# Patient Record
Sex: MALE | Race: BLACK OR AFRICAN AMERICAN | NOT HISPANIC OR LATINO | Employment: FULL TIME | RURAL
[De-identification: names, ages, dates, MRNs, and addresses within clinical notes are randomized per-mention and may not be internally consistent; named-entity substitution may affect disease eponyms.]

---

## 2020-08-28 ENCOUNTER — HISTORICAL (OUTPATIENT)
Dept: ADMINISTRATIVE | Facility: HOSPITAL | Age: 50
End: 2020-08-28

## 2021-04-07 ENCOUNTER — HISTORICAL (OUTPATIENT)
Dept: ADMINISTRATIVE | Facility: HOSPITAL | Age: 51
End: 2021-04-07

## 2021-07-08 ENCOUNTER — HOSPITAL ENCOUNTER (EMERGENCY)
Facility: HOSPITAL | Age: 51
Discharge: HOME | End: 2021-07-08
Admitting: INTERNAL MEDICINE
Payer: COMMERCIAL

## 2021-07-08 ENCOUNTER — OFFICE VISIT (OUTPATIENT)
Dept: PRIMARY CARE CLINIC | Facility: CLINIC | Age: 51
End: 2021-07-08
Payer: COMMERCIAL

## 2021-07-08 ENCOUNTER — HOSPITAL ENCOUNTER (OUTPATIENT)
Dept: RADIOLOGY | Facility: HOSPITAL | Age: 51
Discharge: HOME | End: 2021-07-08
Attending: NURSE PRACTITIONER
Payer: COMMERCIAL

## 2021-07-08 VITALS
HEIGHT: 68 IN | OXYGEN SATURATION: 99 % | RESPIRATION RATE: 20 BRPM | HEART RATE: 103 BPM | DIASTOLIC BLOOD PRESSURE: 79 MMHG | SYSTOLIC BLOOD PRESSURE: 123 MMHG | BODY MASS INDEX: 46.38 KG/M2 | WEIGHT: 306 LBS | TEMPERATURE: 98 F

## 2021-07-08 DIAGNOSIS — E11.9 TYPE 2 DIABETES MELLITUS WITHOUT COMPLICATION, WITHOUT LONG-TERM CURRENT USE OF INSULIN: ICD-10-CM

## 2021-07-08 DIAGNOSIS — I10 ESSENTIAL HYPERTENSION: Primary | ICD-10-CM

## 2021-07-08 DIAGNOSIS — R20.0 NUMBNESS OF FINGERS: ICD-10-CM

## 2021-07-08 DIAGNOSIS — M79.89 LEFT LEG SWELLING: ICD-10-CM

## 2021-07-08 DIAGNOSIS — M79.89 RIGHT LEG SWELLING: ICD-10-CM

## 2021-07-08 LAB — GLUCOSE SERPL-MCNC: 123 MG/DL (ref 70–110)

## 2021-07-08 PROCEDURE — 99212 PR OFFICE/OUTPT VISIT, EST, LEVL II, 10-19 MIN: ICD-10-PCS | Mod: ,,, | Performed by: NURSE PRACTITIONER

## 2021-07-08 PROCEDURE — 99281 EMR DPT VST MAYX REQ PHY/QHP: CPT | Mod: 25

## 2021-07-08 PROCEDURE — 99212 OFFICE O/P EST SF 10 MIN: CPT | Mod: ,,, | Performed by: NURSE PRACTITIONER

## 2021-07-08 PROCEDURE — 99900041 HC LEFT WITHOUT BEING SEEN- EMERGENCY

## 2021-07-08 PROCEDURE — 93970 EXTREMITY STUDY: CPT | Mod: TC

## 2021-07-08 RX ORDER — METFORMIN HYDROCHLORIDE 500 MG/1
TABLET ORAL
COMMUNITY
Start: 2021-06-07 | End: 2021-08-31 | Stop reason: SDUPTHER

## 2021-07-08 RX ORDER — OMEGA-3-ACID ETHYL ESTERS 1 G/1
CAPSULE, LIQUID FILLED ORAL
Status: ON HOLD | COMMUNITY
Start: 2021-01-13 | End: 2022-02-17 | Stop reason: CLARIF

## 2021-07-08 RX ORDER — POTASSIUM CHLORIDE 20 MEQ/1
20 TABLET, EXTENDED RELEASE ORAL DAILY
COMMUNITY
Start: 2021-05-10 | End: 2021-09-30 | Stop reason: SDUPTHER

## 2021-07-08 RX ORDER — AMLODIPINE BESYLATE 10 MG/1
TABLET ORAL
COMMUNITY
Start: 2021-06-07 | End: 2021-08-31 | Stop reason: SDUPTHER

## 2021-07-08 RX ORDER — HYDROCHLOROTHIAZIDE 12.5 MG/1
CAPSULE ORAL
COMMUNITY
Start: 2021-06-07

## 2021-07-08 RX ORDER — DICLOFENAC SODIUM 75 MG/1
TABLET, DELAYED RELEASE ORAL
COMMUNITY
Start: 2021-02-09 | End: 2022-05-21

## 2021-07-08 RX ORDER — OMEPRAZOLE 40 MG/1
CAPSULE, DELAYED RELEASE ORAL
COMMUNITY
Start: 2021-06-07 | End: 2021-08-31 | Stop reason: SDUPTHER

## 2021-07-08 RX ORDER — ACETAMINOPHEN, CAFFEINE, DIHYDROCODEINE BITARTRATE 320.5; 30; 16 MG/1; MG/1; MG/1
CAPSULE ORAL
COMMUNITY
Start: 2021-02-09 | End: 2022-08-02

## 2021-07-08 RX ORDER — NABUMETONE 750 MG/1
TABLET, FILM COATED ORAL
COMMUNITY
Start: 2021-04-05 | End: 2022-05-21

## 2021-07-08 RX ORDER — ATORVASTATIN CALCIUM 20 MG/1
TABLET, FILM COATED ORAL
COMMUNITY
Start: 2021-06-07 | End: 2021-08-31 | Stop reason: SDUPTHER

## 2021-07-08 RX ORDER — POTASSIUM CHLORIDE 600 MG/1
TABLET, FILM COATED, EXTENDED RELEASE ORAL
COMMUNITY
Start: 2021-01-13 | End: 2021-09-30 | Stop reason: SDUPTHER

## 2021-07-09 ENCOUNTER — TELEPHONE (OUTPATIENT)
Dept: PRIMARY CARE CLINIC | Facility: CLINIC | Age: 51
End: 2021-07-09

## 2021-07-09 DIAGNOSIS — M25.562 LEFT KNEE PAIN: ICD-10-CM

## 2021-07-09 DIAGNOSIS — M25.561 RIGHT KNEE PAIN: ICD-10-CM

## 2021-07-09 DIAGNOSIS — M17.0 PRIMARY OSTEOARTHRITIS OF BOTH KNEES: Primary | ICD-10-CM

## 2021-07-21 ENCOUNTER — CLINICAL SUPPORT (OUTPATIENT)
Dept: REHABILITATION | Facility: HOSPITAL | Age: 51
End: 2021-07-21
Payer: COMMERCIAL

## 2021-07-21 DIAGNOSIS — M25.462 SWELLING OF BOTH KNEES: ICD-10-CM

## 2021-07-21 DIAGNOSIS — M25.461 SWELLING OF BOTH KNEES: ICD-10-CM

## 2021-07-21 DIAGNOSIS — M25.561 RIGHT KNEE PAIN: ICD-10-CM

## 2021-07-21 DIAGNOSIS — M17.0 PRIMARY OSTEOARTHRITIS OF BOTH KNEES: ICD-10-CM

## 2021-07-21 DIAGNOSIS — M25.562 LEFT KNEE PAIN: ICD-10-CM

## 2021-07-21 PROCEDURE — 97014 ELECTRIC STIMULATION THERAPY: CPT

## 2021-07-21 PROCEDURE — 97110 THERAPEUTIC EXERCISES: CPT

## 2021-07-21 PROCEDURE — 97161 PT EVAL LOW COMPLEX 20 MIN: CPT

## 2021-07-26 ENCOUNTER — TELEPHONE (OUTPATIENT)
Dept: PRIMARY CARE CLINIC | Facility: CLINIC | Age: 51
End: 2021-07-26

## 2021-07-30 ENCOUNTER — CLINICAL SUPPORT (OUTPATIENT)
Dept: REHABILITATION | Facility: HOSPITAL | Age: 51
End: 2021-07-30
Payer: COMMERCIAL

## 2021-07-30 PROCEDURE — 97110 THERAPEUTIC EXERCISES: CPT

## 2021-07-30 PROCEDURE — 97014 ELECTRIC STIMULATION THERAPY: CPT

## 2021-08-12 ENCOUNTER — DOCUMENTATION ONLY (OUTPATIENT)
Dept: REHABILITATION | Facility: HOSPITAL | Age: 51
End: 2021-08-12

## 2021-08-16 ENCOUNTER — OFFICE VISIT (OUTPATIENT)
Dept: VASCULAR SURGERY | Facility: CLINIC | Age: 51
End: 2021-08-16
Payer: COMMERCIAL

## 2021-08-16 VITALS
RESPIRATION RATE: 18 BRPM | SYSTOLIC BLOOD PRESSURE: 120 MMHG | BODY MASS INDEX: 46.2 KG/M2 | DIASTOLIC BLOOD PRESSURE: 70 MMHG | HEIGHT: 68 IN | HEART RATE: 96 BPM | WEIGHT: 304.81 LBS

## 2021-08-16 DIAGNOSIS — R60.0 EDEMA OF EXTREMITIES: ICD-10-CM

## 2021-08-16 DIAGNOSIS — R23.8 OTHER SKIN CHANGES: ICD-10-CM

## 2021-08-16 DIAGNOSIS — R20.0 NUMBNESS OF FINGERS: ICD-10-CM

## 2021-08-16 DIAGNOSIS — M79.604 PAIN IN BOTH LOWER EXTREMITIES: Primary | ICD-10-CM

## 2021-08-16 DIAGNOSIS — M79.605 PAIN IN BOTH LOWER EXTREMITIES: Primary | ICD-10-CM

## 2021-08-16 PROCEDURE — 99203 OFFICE O/P NEW LOW 30 MIN: CPT | Mod: S$PBB,,, | Performed by: FAMILY MEDICINE

## 2021-08-16 PROCEDURE — 99203 PR OFFICE/OUTPT VISIT, NEW, LEVL III, 30-44 MIN: ICD-10-PCS | Mod: S$PBB,,, | Performed by: FAMILY MEDICINE

## 2021-08-16 PROCEDURE — 99215 OFFICE O/P EST HI 40 MIN: CPT | Mod: PBBFAC | Performed by: FAMILY MEDICINE

## 2021-08-26 ENCOUNTER — HOSPITAL ENCOUNTER (OUTPATIENT)
Dept: RADIOLOGY | Facility: HOSPITAL | Age: 51
Discharge: HOME | End: 2021-08-26
Attending: FAMILY MEDICINE
Payer: COMMERCIAL

## 2021-08-26 ENCOUNTER — OFFICE VISIT (OUTPATIENT)
Dept: VASCULAR SURGERY | Facility: CLINIC | Age: 51
End: 2021-08-26
Payer: COMMERCIAL

## 2021-08-26 VITALS
RESPIRATION RATE: 14 BRPM | DIASTOLIC BLOOD PRESSURE: 84 MMHG | SYSTOLIC BLOOD PRESSURE: 124 MMHG | BODY MASS INDEX: 46.07 KG/M2 | WEIGHT: 304 LBS | HEIGHT: 68 IN | HEART RATE: 92 BPM

## 2021-08-26 DIAGNOSIS — R60.0 EDEMA OF EXTREMITIES: ICD-10-CM

## 2021-08-26 DIAGNOSIS — M79.604 PAIN IN BOTH LOWER EXTREMITIES: ICD-10-CM

## 2021-08-26 DIAGNOSIS — R23.8 OTHER SKIN CHANGES: ICD-10-CM

## 2021-08-26 DIAGNOSIS — M79.605 PAIN IN BOTH LOWER EXTREMITIES: ICD-10-CM

## 2021-08-26 DIAGNOSIS — I87.2 CHRONIC VENOUS INSUFFICIENCY OF LOWER EXTREMITY: Primary | ICD-10-CM

## 2021-08-26 PROCEDURE — 99214 OFFICE O/P EST MOD 30 MIN: CPT | Mod: PBBFAC,25 | Performed by: NURSE PRACTITIONER

## 2021-08-26 PROCEDURE — 93970 US VENOUS REFLUX STUDY BILATERAL: ICD-10-PCS | Mod: 26,,, | Performed by: FAMILY MEDICINE

## 2021-08-26 PROCEDURE — 99214 PR OFFICE/OUTPT VISIT, EST, LEVL IV, 30-39 MIN: ICD-10-PCS | Mod: S$PBB,,, | Performed by: NURSE PRACTITIONER

## 2021-08-26 PROCEDURE — 93970 EXTREMITY STUDY: CPT | Mod: TC

## 2021-08-26 PROCEDURE — 99214 OFFICE O/P EST MOD 30 MIN: CPT | Mod: S$PBB,,, | Performed by: NURSE PRACTITIONER

## 2021-08-26 PROCEDURE — 93970 EXTREMITY STUDY: CPT | Mod: 26,,, | Performed by: FAMILY MEDICINE

## 2021-08-31 RX ORDER — OMEPRAZOLE 40 MG/1
40 CAPSULE, DELAYED RELEASE ORAL EVERY MORNING
Qty: 90 CAPSULE | Refills: 1 | Status: SHIPPED | OUTPATIENT
Start: 2021-08-31 | End: 2022-02-01

## 2021-08-31 RX ORDER — METFORMIN HYDROCHLORIDE 500 MG/1
500 TABLET ORAL
Qty: 90 TABLET | Refills: 1 | Status: SHIPPED | OUTPATIENT
Start: 2021-08-31 | End: 2022-02-01

## 2021-08-31 RX ORDER — ATORVASTATIN CALCIUM 20 MG/1
20 TABLET, FILM COATED ORAL NIGHTLY
Qty: 90 TABLET | Refills: 1 | Status: SHIPPED | OUTPATIENT
Start: 2021-08-31 | End: 2022-02-01

## 2021-08-31 RX ORDER — AMLODIPINE BESYLATE 10 MG/1
10 TABLET ORAL DAILY
Qty: 90 TABLET | Refills: 1 | Status: SHIPPED | OUTPATIENT
Start: 2021-08-31 | End: 2022-02-01

## 2021-09-14 ENCOUNTER — OFFICE VISIT (OUTPATIENT)
Dept: NEUROLOGY | Facility: CLINIC | Age: 51
End: 2021-09-14
Payer: COMMERCIAL

## 2021-09-14 ENCOUNTER — TELEPHONE (OUTPATIENT)
Dept: NEUROLOGY | Facility: CLINIC | Age: 51
End: 2021-09-14

## 2021-09-14 VITALS
DIASTOLIC BLOOD PRESSURE: 88 MMHG | HEART RATE: 121 BPM | BODY MASS INDEX: 44.56 KG/M2 | OXYGEN SATURATION: 97 % | SYSTOLIC BLOOD PRESSURE: 128 MMHG | HEIGHT: 68 IN | WEIGHT: 294 LBS

## 2021-09-14 DIAGNOSIS — E55.9 VITAMIN D DEFICIENCY: ICD-10-CM

## 2021-09-14 DIAGNOSIS — E53.8 VITAMIN B12 DEFICIENCY: Primary | ICD-10-CM

## 2021-09-14 DIAGNOSIS — G56.92 NEUROPATHY OF HAND, LEFT: ICD-10-CM

## 2021-09-14 DIAGNOSIS — R20.0 NUMBNESS OF FINGERS: ICD-10-CM

## 2021-09-14 PROCEDURE — 99203 OFFICE O/P NEW LOW 30 MIN: CPT | Mod: S$PBB,,, | Performed by: NURSE PRACTITIONER

## 2021-09-14 PROCEDURE — 99215 OFFICE O/P EST HI 40 MIN: CPT | Mod: PBBFAC | Performed by: NURSE PRACTITIONER

## 2021-09-14 PROCEDURE — 99203 PR OFFICE/OUTPT VISIT, NEW, LEVL III, 30-44 MIN: ICD-10-PCS | Mod: S$PBB,,, | Performed by: NURSE PRACTITIONER

## 2021-09-14 RX ORDER — ERGOCALCIFEROL 1.25 MG/1
CAPSULE ORAL
Qty: 12 CAPSULE | Refills: 2 | Status: SHIPPED | OUTPATIENT
Start: 2021-09-14

## 2021-09-30 DIAGNOSIS — E87.6 HYPOKALEMIA: Primary | ICD-10-CM

## 2021-09-30 RX ORDER — POTASSIUM CHLORIDE 20 MEQ/1
20 TABLET, EXTENDED RELEASE ORAL DAILY
Qty: 30 TABLET | Refills: 5 | Status: SHIPPED | OUTPATIENT
Start: 2021-09-30 | End: 2022-10-31 | Stop reason: SDUPTHER

## 2021-12-06 ENCOUNTER — OFFICE VISIT (OUTPATIENT)
Dept: PRIMARY CARE CLINIC | Facility: CLINIC | Age: 51
End: 2021-12-06
Payer: COMMERCIAL

## 2021-12-06 ENCOUNTER — TELEPHONE (OUTPATIENT)
Dept: PRIMARY CARE CLINIC | Facility: CLINIC | Age: 51
End: 2021-12-06
Payer: COMMERCIAL

## 2021-12-06 VITALS
BODY MASS INDEX: 44.88 KG/M2 | HEART RATE: 88 BPM | DIASTOLIC BLOOD PRESSURE: 73 MMHG | HEIGHT: 69 IN | TEMPERATURE: 98 F | WEIGHT: 303 LBS | RESPIRATION RATE: 18 BRPM | SYSTOLIC BLOOD PRESSURE: 122 MMHG | OXYGEN SATURATION: 99 %

## 2021-12-06 DIAGNOSIS — I10 PRIMARY HYPERTENSION: Primary | ICD-10-CM

## 2021-12-06 DIAGNOSIS — I10 ESSENTIAL HYPERTENSION: Primary | ICD-10-CM

## 2021-12-06 LAB
ANION GAP SERPL CALCULATED.3IONS-SCNC: 12 MMOL/L (ref 7–16)
BUN SERPL-MCNC: 25 MG/DL (ref 7–18)
BUN/CREAT SERPL: 22 (ref 6–20)
CALCIUM SERPL-MCNC: 8.3 MG/DL (ref 8.5–10.1)
CHLORIDE SERPL-SCNC: 105 MMOL/L (ref 98–107)
CHOLEST SERPL-MCNC: 122 MG/DL (ref 0–200)
CHOLEST/HDLC SERPL: 2.1 {RATIO}
CO2 SERPL-SCNC: 27 MMOL/L (ref 21–32)
CREAT SERPL-MCNC: 1.16 MG/DL (ref 0.7–1.3)
GLUCOSE SERPL-MCNC: 85 MG/DL (ref 74–106)
HDLC SERPL-MCNC: 59 MG/DL (ref 40–60)
LDLC SERPL CALC-MCNC: 11 MG/DL
LDLC/HDLC SERPL: 0.2 {RATIO}
NONHDLC SERPL-MCNC: 63 MG/DL
POTASSIUM SERPL-SCNC: 3.2 MMOL/L (ref 3.5–5.1)
SODIUM SERPL-SCNC: 141 MMOL/L (ref 136–145)
TRIGL SERPL-MCNC: 260 MG/DL (ref 35–150)
VLDLC SERPL-MCNC: 52 MG/DL

## 2021-12-06 PROCEDURE — 80061 LIPID PANEL: CPT | Mod: ,,, | Performed by: CLINICAL MEDICAL LABORATORY

## 2021-12-06 PROCEDURE — 80061 LIPID PANEL: ICD-10-PCS | Mod: ,,, | Performed by: CLINICAL MEDICAL LABORATORY

## 2021-12-06 PROCEDURE — 80048 BASIC METABOLIC PANEL: ICD-10-PCS | Mod: ,,, | Performed by: CLINICAL MEDICAL LABORATORY

## 2021-12-06 PROCEDURE — 99212 OFFICE O/P EST SF 10 MIN: CPT | Mod: ,,, | Performed by: NURSE PRACTITIONER

## 2021-12-06 PROCEDURE — 99212 PR OFFICE/OUTPT VISIT, EST, LEVL II, 10-19 MIN: ICD-10-PCS | Mod: ,,, | Performed by: NURSE PRACTITIONER

## 2021-12-06 PROCEDURE — 80048 BASIC METABOLIC PNL TOTAL CA: CPT | Mod: ,,, | Performed by: CLINICAL MEDICAL LABORATORY

## 2021-12-08 ENCOUNTER — TELEPHONE (OUTPATIENT)
Dept: PRIMARY CARE CLINIC | Facility: CLINIC | Age: 51
End: 2021-12-08
Payer: COMMERCIAL

## 2021-12-09 ENCOUNTER — OFFICE VISIT (OUTPATIENT)
Dept: NEUROLOGY | Facility: CLINIC | Age: 51
End: 2021-12-09
Payer: COMMERCIAL

## 2021-12-09 VITALS
HEIGHT: 69 IN | BODY MASS INDEX: 44.88 KG/M2 | DIASTOLIC BLOOD PRESSURE: 72 MMHG | SYSTOLIC BLOOD PRESSURE: 128 MMHG | WEIGHT: 303 LBS | RESPIRATION RATE: 18 BRPM

## 2021-12-09 DIAGNOSIS — G56.22 ULNAR NERVE COMPRESSION, LEFT: ICD-10-CM

## 2021-12-09 DIAGNOSIS — G56.21 ULNAR NERVE COMPRESSION, RIGHT: ICD-10-CM

## 2021-12-09 DIAGNOSIS — G56.03 CARPAL TUNNEL SYNDROME ON BOTH SIDES: Primary | ICD-10-CM

## 2021-12-09 PROCEDURE — 99214 PR OFFICE/OUTPT VISIT, EST, LEVL IV, 30-39 MIN: ICD-10-PCS | Mod: S$PBB,,, | Performed by: NURSE PRACTITIONER

## 2021-12-09 PROCEDURE — 99214 OFFICE O/P EST MOD 30 MIN: CPT | Mod: S$PBB,,, | Performed by: NURSE PRACTITIONER

## 2021-12-09 PROCEDURE — 99214 OFFICE O/P EST MOD 30 MIN: CPT | Mod: PBBFAC | Performed by: NURSE PRACTITIONER

## 2021-12-09 RX ORDER — GABAPENTIN 100 MG/1
100 CAPSULE ORAL 3 TIMES DAILY
Qty: 90 CAPSULE | Refills: 11 | Status: SHIPPED | OUTPATIENT
Start: 2021-12-09 | End: 2022-06-14 | Stop reason: SDUPTHER

## 2021-12-10 ENCOUNTER — TELEPHONE (OUTPATIENT)
Dept: PRIMARY CARE CLINIC | Facility: CLINIC | Age: 51
End: 2021-12-10
Payer: COMMERCIAL

## 2021-12-10 DIAGNOSIS — E78.1 HYPERTRIGLYCERIDEMIA: Primary | ICD-10-CM

## 2021-12-13 RX ORDER — GLUCOSAM/CHONDRO/HERB 149/HYAL 750-100 MG
1 TABLET ORAL DAILY
Qty: 90 CAPSULE | Refills: 3 | Status: SHIPPED | OUTPATIENT
Start: 2021-12-13 | End: 2024-01-04 | Stop reason: SDUPTHER

## 2021-12-14 ENCOUNTER — TELEPHONE (OUTPATIENT)
Dept: PRIMARY CARE CLINIC | Facility: CLINIC | Age: 51
End: 2021-12-14
Payer: COMMERCIAL

## 2021-12-14 DIAGNOSIS — E78.1 HYPERTRIGLYCERIDEMIA: Primary | ICD-10-CM

## 2021-12-14 RX ORDER — TIMOLOL MALEATE 5 MG/ML
SOLUTION/ DROPS OPHTHALMIC
COMMUNITY
Start: 2021-09-09

## 2021-12-14 RX ORDER — POTASSIUM CHLORIDE 600 MG/1
TABLET, FILM COATED, EXTENDED RELEASE ORAL
Status: ON HOLD | COMMUNITY
Start: 2021-11-20 | End: 2022-02-17 | Stop reason: CLARIF

## 2021-12-14 RX ORDER — SODIUM HYALURONATE INTRA-ARTICULAR SOLN PREF SYR 25 MG/2.5ML 25/2.5 MG/ML
SOLUTION PREFILLED SYRINGE INTRAARTICULAR
Status: ON HOLD | COMMUNITY
Start: 2021-10-13 | End: 2022-02-17 | Stop reason: CLARIF

## 2022-01-12 ENCOUNTER — HOSPITAL ENCOUNTER (OUTPATIENT)
Dept: RADIOLOGY | Facility: HOSPITAL | Age: 52
Discharge: HOME | End: 2022-01-12
Attending: ORTHOPAEDIC SURGERY
Payer: COMMERCIAL

## 2022-01-12 DIAGNOSIS — M25.532 BILATERAL WRIST PAIN: ICD-10-CM

## 2022-01-12 DIAGNOSIS — M25.531 BILATERAL WRIST PAIN: ICD-10-CM

## 2022-01-12 PROCEDURE — 73110 X-RAY EXAM OF WRIST: CPT | Mod: TC,50

## 2022-01-14 ENCOUNTER — CLINICAL SUPPORT (OUTPATIENT)
Dept: CARDIOLOGY | Facility: CLINIC | Age: 52
End: 2022-01-14
Payer: COMMERCIAL

## 2022-01-14 DIAGNOSIS — Z01.810 PRE-OPERATIVE CARDIOVASCULAR EXAMINATION: ICD-10-CM

## 2022-01-14 PROCEDURE — 93005 ELECTROCARDIOGRAM TRACING: CPT | Mod: PBBFAC | Performed by: INTERNAL MEDICINE

## 2022-01-14 PROCEDURE — 93010 ELECTROCARDIOGRAM REPORT: CPT | Mod: S$PBB,,, | Performed by: INTERNAL MEDICINE

## 2022-01-14 PROCEDURE — 93010 EKG 12-LEAD: ICD-10-PCS | Mod: S$PBB,,, | Performed by: INTERNAL MEDICINE

## 2022-01-14 PROCEDURE — 99211 OFF/OP EST MAY X REQ PHY/QHP: CPT | Mod: PBBFAC

## 2022-01-17 RX ORDER — TRAMADOL HYDROCHLORIDE 50 MG/1
TABLET ORAL
COMMUNITY
Start: 2021-12-30 | End: 2022-08-02

## 2022-01-26 ENCOUNTER — OFFICE VISIT (OUTPATIENT)
Dept: VASCULAR SURGERY | Facility: CLINIC | Age: 52
End: 2022-01-26
Payer: COMMERCIAL

## 2022-01-26 VITALS
RESPIRATION RATE: 20 BRPM | DIASTOLIC BLOOD PRESSURE: 76 MMHG | SYSTOLIC BLOOD PRESSURE: 136 MMHG | HEART RATE: 100 BPM | BODY MASS INDEX: 44.05 KG/M2 | HEIGHT: 68 IN | WEIGHT: 290.63 LBS

## 2022-01-26 DIAGNOSIS — I87.2 VENOUS INSUFFICIENCY: ICD-10-CM

## 2022-01-26 DIAGNOSIS — R60.0 EDEMA, LOWER EXTREMITY: ICD-10-CM

## 2022-01-26 DIAGNOSIS — M79.604 LEG PAIN, BILATERAL: ICD-10-CM

## 2022-01-26 DIAGNOSIS — I87.2 VENOUS INSUFFICIENCY (CHRONIC) (PERIPHERAL): ICD-10-CM

## 2022-01-26 DIAGNOSIS — M79.605 LEG PAIN, BILATERAL: ICD-10-CM

## 2022-01-26 DIAGNOSIS — I87.2 VENOUS INSUFFICIENCY: Primary | ICD-10-CM

## 2022-01-26 DIAGNOSIS — L81.9 HYPERPIGMENTATION OF SKIN: Primary | ICD-10-CM

## 2022-01-26 PROCEDURE — 3075F SYST BP GE 130 - 139MM HG: CPT | Mod: ,,, | Performed by: FAMILY MEDICINE

## 2022-01-26 PROCEDURE — 99214 PR OFFICE/OUTPT VISIT, EST, LEVL IV, 30-39 MIN: ICD-10-PCS | Mod: S$PBB,,, | Performed by: FAMILY MEDICINE

## 2022-01-26 PROCEDURE — 3008F BODY MASS INDEX DOCD: CPT | Mod: ,,, | Performed by: FAMILY MEDICINE

## 2022-01-26 PROCEDURE — 1159F MED LIST DOCD IN RCRD: CPT | Mod: ,,, | Performed by: FAMILY MEDICINE

## 2022-01-26 PROCEDURE — 3008F PR BODY MASS INDEX (BMI) DOCUMENTED: ICD-10-PCS | Mod: ,,, | Performed by: FAMILY MEDICINE

## 2022-01-26 PROCEDURE — 1160F RVW MEDS BY RX/DR IN RCRD: CPT | Mod: ,,, | Performed by: FAMILY MEDICINE

## 2022-01-26 PROCEDURE — 3078F DIAST BP <80 MM HG: CPT | Mod: ,,, | Performed by: FAMILY MEDICINE

## 2022-01-26 PROCEDURE — 3075F PR MOST RECENT SYSTOLIC BLOOD PRESS GE 130-139MM HG: ICD-10-PCS | Mod: ,,, | Performed by: FAMILY MEDICINE

## 2022-01-26 PROCEDURE — 3078F PR MOST RECENT DIASTOLIC BLOOD PRESSURE < 80 MM HG: ICD-10-PCS | Mod: ,,, | Performed by: FAMILY MEDICINE

## 2022-01-26 PROCEDURE — 1159F PR MEDICATION LIST DOCUMENTED IN MEDICAL RECORD: ICD-10-PCS | Mod: ,,, | Performed by: FAMILY MEDICINE

## 2022-01-26 PROCEDURE — 99214 OFFICE O/P EST MOD 30 MIN: CPT | Mod: S$PBB,,, | Performed by: FAMILY MEDICINE

## 2022-01-26 PROCEDURE — 1160F PR REVIEW ALL MEDS BY PRESCRIBER/CLIN PHARMACIST DOCUMENTED: ICD-10-PCS | Mod: ,,, | Performed by: FAMILY MEDICINE

## 2022-01-26 PROCEDURE — 99215 OFFICE O/P EST HI 40 MIN: CPT | Mod: PBBFAC | Performed by: FAMILY MEDICINE

## 2022-01-26 RX ORDER — SODIUM CHLORIDE 9 MG/ML
30 INJECTION, SOLUTION INTRAVENOUS CONTINUOUS
Status: CANCELLED | OUTPATIENT
Start: 2022-02-11

## 2022-01-26 NOTE — PROGRESS NOTES
[facility] CLINIC NOTE    Patient ID: Papo Bell is a 51 y.o. male.    I. HISTORY     Chief Complaint:   Chief Complaint   Patient presents with    Follow-up     4 1/2 months in compressions.        HPI: Papo Bell is a 51 y.o. male who presents today for follow-up after 4 and half months of conservative therapy for chronic venous insufficiency consisting of 20-30 mmHg compression stockings, calf pumping exercises and therapeutic leg elevation.  The patient's states that these measures helped very little with his lower extremity symptoms.  His symptoms are worse on the left.  He states he continues to have daily leg swelling, heaviness, throbbing and fatigue.  Other symptoms outlined in chart below.  He states that these symptoms are life altering at this time would like to have underlying condition corrected if possible.    The patient underwent a bilateral Doppler study on 07/09/2021 which was negative for DVT bilaterally.    Venous reflux study performed 8/26/21 showed reflux in the proximal calf to lower calf in the right GSV , noted reflux on the left at the SFJ and proximal thigh. No other areas of reflux identified.       Venous Disease Medical Necessity Documentation Initial Visit Date:  8/16/21 Return Check Date:   1/26/22   1. Have you ever had a rupture or bleed from a varicose vein in your leg(s)?              [] Yes  [x] No   [] Yes   [x] No   2. Have you ever been diagnosed with phlebitis, cellulitis, or inflammation in the area of the varicose veins of  your leg(s)?  [] Yes  [x] No    [] Yes   [x] No   3. Do you have darkened or inflamed skin on your legs?   [x] Yes   [] No   [x] Yes   [] No   4. Do you have leg swelling?     [x] Yes   [] No   [x] Yes   [] No   5. Do you have leg pain?   [x] Yes   [] No   [] Yes   [] No   If yes, describe the type of pain?    []   Stabbing []  Radiating []  Aching   []  Tightness [x]  Throbbing               [x]  Burning [x]  Cramping              6. Do you  have leg discomfort?   [x] Yes   [] No   [x] Yes   [] No   If yes, describe the type of discomfort?    [x]  Heaviness [x]  Fullness   []  Restlessness [] Tired/Fatigued [x] Itching              7. Have you ever worn compression hose?   [x] Yes, intermittently   [] No   [x] Yes   [] No   If yes, how long?           8. Do you elevate your legs while sitting?   [x] Yes   [] No   [x] Yes   [] No   9. Does venous disease (varicose veins, ulcers, skin changes, leg pain/swelling) interfere with your daily life?  If yes, check activities you are limited or unable to do.    []  Shower  []   Walk  []  Exercise  [] Play with children/grandchildren  []  Shop [x] Work [x] Stand for any period of time [] Sleep                               [] Sitting for an extended period of time.           [x] Yes   [] No   [x] Yes   [] No   10. Do you exercise/have you tried to exercise (i.e.  Walk our participate in a regular exercise routine)?  [] Yes  [x] No   [x] Yes   [] No   11. BMI  46.3  44.2          Past Medical History:   Diagnosis Date    BMI 45.0-49.9, adult 06/20/2018    Cervicalgia 04/07/2021    Diabetes mellitus, type 2     DJD (degenerative joint disease) 03/15/2019    Edema 01/27/2021    Essential hypertension 04/07/2021    GERD (gastroesophageal reflux disease) 01/13/2021    Hyperlipidemia     Hypertension     Low back pain 06/19/2019    Osteoporosis     Pain in right shoulder 04/07/2021    Tachycardia 04/07/2021        Past Surgical History:   Procedure Laterality Date    PATELLA TENDONS REPAIR Bilateral     TORN MENISCUS Left     WRIST FRACTURE SURGERY Left        Social History     Tobacco Use   Smoking Status Never Smoker   Smokeless Tobacco Never Used         Current Outpatient Medications:     acetaminophen-caff-dihydrocod 320.5-30-16 mg Cap, , Disp: , Rfl:     amLODIPine (NORVASC) 10 MG tablet, Take 1 tablet (10 mg total) by mouth once daily., Disp: 90 tablet, Rfl: 1    atorvastatin (LIPITOR) 20 MG  tablet, Take 1 tablet (20 mg total) by mouth every evening., Disp: 90 tablet, Rfl: 1    diclofenac (VOLTAREN) 75 MG EC tablet, , Disp: , Rfl:     ergocalciferol (ERGOCALCIFEROL) 50,000 unit Cap, Take 1 capsule by mouth weekly for 12 weeks then take 1 capsule monthly after that, Disp: 12 capsule, Rfl: 2    gabapentin (NEURONTIN) 100 MG capsule, Take 1 capsule (100 mg total) by mouth 3 (three) times daily., Disp: 90 capsule, Rfl: 11    hydroCHLOROthiazide (MICROZIDE) 12.5 mg capsule, , Disp: , Rfl:     KLOR-CON 8 8 mEq TbSR, , Disp: , Rfl:     metFORMIN (GLUCOPHAGE) 500 MG tablet, Take 1 tablet (500 mg total) by mouth daily with breakfast., Disp: 90 tablet, Rfl: 1    nabumetone (RELAFEN) 750 MG tablet, TAKE 1-2 TABLETS BY MOUTH DAILY WITH FOOD IF NEEDED FOR PAIN., Disp: , Rfl:     omega 3-dha-epa-fish oil (FISH OIL) 1,000 mg (120 mg-180 mg) Cap, Take 1 capsule by mouth once daily., Disp: 90 capsule, Rfl: 3    omega-3 acid ethyl esters (LOVAZA) 1 gram capsule, , Disp: , Rfl:     omeprazole (PRILOSEC) 40 MG capsule, Take 1 capsule (40 mg total) by mouth every morning., Disp: 90 capsule, Rfl: 1    potassium chloride SA (K-DUR,KLOR-CON) 20 MEQ tablet, Take 1 tablet (20 mEq total) by mouth once daily., Disp: 30 tablet, Rfl: 5    SUPARTZ FX 10 mg/mL injection, , Disp: , Rfl:     timolol maleate 0.5% (TIMOPTIC) 0.5 % Drop, PUT 1 DROP INTO AFFECTED EYE(S) EVERY MORNING, Disp: , Rfl:     traMADoL (ULTRAM) 50 mg tablet, TAKE 1-2 TABLETS BY MOUTH 3 TIMES A DAY FOR PAIN., Disp: , Rfl:     Review of Systems   Constitutional: Negative for activity change, chills, diaphoresis, fatigue and fever.   Respiratory: Negative for cough and shortness of breath.    Cardiovascular: Positive for leg swelling. Negative for chest pain and claudication.        Hyperpigmentation LE   Gastrointestinal: Negative for nausea and vomiting.   Musculoskeletal: Positive for leg pain. Negative for joint swelling.   Integumentary:  Negative  for rash and wound.   Neurological: Negative for weakness and numbness.        II. PHYSICAL EXAM     Physical Exam  Constitutional:       General: He is awake. He is not in acute distress.     Appearance: Normal appearance. He is obese. He is not ill-appearing or toxic-appearing.   HENT:      Head: Normocephalic and atraumatic.   Eyes:      Extraocular Movements: Extraocular movements intact.      Conjunctiva/sclera: Conjunctivae normal.      Pupils: Pupils are equal, round, and reactive to light.   Neck:      Vascular: No carotid bruit or JVD.   Cardiovascular:      Rate and Rhythm: Normal rate and regular rhythm.      Pulses:           Dorsalis pedis pulses are detected w/ Doppler on the right side and detected w/ Doppler on the left side.        Posterior tibial pulses are detected w/ Doppler on the right side and detected w/ Doppler on the left side.      Heart sounds: No murmur heard.      Pulmonary:      Effort: Pulmonary effort is normal. No respiratory distress.      Breath sounds: No stridor. No wheezing, rhonchi or rales.   Musculoskeletal:         General: No swelling, tenderness or deformity.      Right lower le+ Edema present.      Left lower le+ Edema present.   Feet:      Comments: Triphasic dopplerable pulses bilaterally.  Skin:     General: Skin is warm.      Capillary Refill: Capillary refill takes less than 2 seconds.      Coloration: Skin is not ashen.      Findings: No bruising, erythema, lesion, rash or wound.      Comments: Mild darker skin changes noted of bilateral gaiter regions.    Neurological:      Mental Status: He is alert and oriented to person, place, and time.      Motor: No weakness.   Psychiatric:         Speech: Speech normal.         Behavior: Behavior normal. Behavior is cooperative.       Reticular/Spider veins noted:  RLE: none  LLE: none    Varicose veins noted:  RLE: none  LLE:  none    CEAP Classification  Clinical Signs: Class 4 - Skin changes ascribed to venous  disease  Etiologic Classification: Primary  Anatomic distribution: Superficial  Pathophysiologic dysfunction: Reflux       Venous Clinical Severity Score  Pain:2=Daily, moderate activity limitation, occasional analgesics  Varicose Veins: 0=None  Venous Edema: 2=Afternoon edema, above ankle  Pigmentation: 2=Diffuse over most of gaiter distribution (lower 1/3) or recent pigmentation (purple)  Inflammation: 0=None  Induration: 0=None  Number of Active Ulcers: 0=0  Active Ulceration, Duration: 0=None  Active Ulcer Size: 0=None  Compressive Therapy: 3=Full compliance, stockings + elevation  Total Score: 9       III. ASSESSMENT & PLAN (MEDICAL DECISION MAKING)     1. Hyperpigmentation of skin    2. Edema, lower extremity    3. Venous insufficiency    4. Leg pain, bilateral        Assessment/Diagnosis and Plan:  The patient continues to have sequela of chronic venous insufficiency despite over 3 months of conservative therapy. The patient continues to have life altering symptoms as well as a positive reflux study as noted in the history of present illness above. At this time I believe it would be reasonable to proceed with a left great saphenous vein endovenous ablation for symptomatic venous insufficiency.  Untreated venous disease increases the patient's risk for cellulitis, deep vein thrombosis, chronic skin changes and venous ulceration.  Risk and benefits of the procedure were explained to the patient and the patient wishes to proceed. The patient does not have overly distended veins and denies history of DVT/PE and will not require prophylactic anticoagulation.      Collins [place],

## 2022-02-01 DIAGNOSIS — Z01.818 PRE-OPERATIVE CLEARANCE: Primary | ICD-10-CM

## 2022-02-11 ENCOUNTER — ANESTHESIA EVENT (OUTPATIENT)
Dept: SURGERY | Facility: HOSPITAL | Age: 52
End: 2022-02-11
Payer: COMMERCIAL

## 2022-02-11 ENCOUNTER — ANESTHESIA (OUTPATIENT)
Dept: SURGERY | Facility: HOSPITAL | Age: 52
End: 2022-02-11
Payer: COMMERCIAL

## 2022-02-11 ENCOUNTER — HOSPITAL ENCOUNTER (OUTPATIENT)
Facility: HOSPITAL | Age: 52
Discharge: HOME | End: 2022-02-11
Attending: FAMILY MEDICINE | Admitting: FAMILY MEDICINE
Payer: COMMERCIAL

## 2022-02-11 VITALS
BODY MASS INDEX: 44.25 KG/M2 | TEMPERATURE: 99 F | HEART RATE: 100 BPM | WEIGHT: 292 LBS | OXYGEN SATURATION: 100 % | SYSTOLIC BLOOD PRESSURE: 126 MMHG | RESPIRATION RATE: 16 BRPM | HEIGHT: 68 IN | DIASTOLIC BLOOD PRESSURE: 73 MMHG

## 2022-02-11 DIAGNOSIS — I87.2 VENOUS INSUFFICIENCY: ICD-10-CM

## 2022-02-11 DIAGNOSIS — I87.2 VENOUS INSUFFICIENCY (CHRONIC) (PERIPHERAL): Primary | ICD-10-CM

## 2022-02-11 LAB
GLUCOSE SERPL-MCNC: 110 MG/DL (ref 70–105)
GLUCOSE SERPL-MCNC: 94 MG/DL (ref 70–105)

## 2022-02-11 PROCEDURE — 36000705 HC OR TIME LEV I EA ADD 15 MIN: Performed by: FAMILY MEDICINE

## 2022-02-11 PROCEDURE — D9220A PRA ANESTHESIA: Mod: ANES,,, | Performed by: ANESTHESIOLOGY

## 2022-02-11 PROCEDURE — 36475 PR ENDOVENOUS RF, 1ST VEIN: ICD-10-PCS | Mod: LT,,, | Performed by: FAMILY MEDICINE

## 2022-02-11 PROCEDURE — 71000033 HC RECOVERY, INTIAL HOUR: Performed by: FAMILY MEDICINE

## 2022-02-11 PROCEDURE — 63600175 PHARM REV CODE 636 W HCPCS: Performed by: NURSE ANESTHETIST, CERTIFIED REGISTERED

## 2022-02-11 PROCEDURE — 37000009 HC ANESTHESIA EA ADD 15 MINS: Performed by: FAMILY MEDICINE

## 2022-02-11 PROCEDURE — C1885 CATH, TRANSLUMIN ANGIO LASER: HCPCS | Performed by: FAMILY MEDICINE

## 2022-02-11 PROCEDURE — 27201423 OPTIME MED/SURG SUP & DEVICES STERILE SUPPLY: Performed by: FAMILY MEDICINE

## 2022-02-11 PROCEDURE — D9220A PRA ANESTHESIA: ICD-10-PCS | Mod: CRNA,,, | Performed by: NURSE ANESTHETIST, CERTIFIED REGISTERED

## 2022-02-11 PROCEDURE — 82962 GLUCOSE BLOOD TEST: CPT

## 2022-02-11 PROCEDURE — 71000015 HC POSTOP RECOV 1ST HR: Performed by: FAMILY MEDICINE

## 2022-02-11 PROCEDURE — 25000003 PHARM REV CODE 250: Performed by: NURSE ANESTHETIST, CERTIFIED REGISTERED

## 2022-02-11 PROCEDURE — 36000704 HC OR TIME LEV I 1ST 15 MIN: Performed by: FAMILY MEDICINE

## 2022-02-11 PROCEDURE — D9220A PRA ANESTHESIA: ICD-10-PCS | Mod: ANES,,, | Performed by: ANESTHESIOLOGY

## 2022-02-11 PROCEDURE — 36475 ENDOVENOUS RF 1ST VEIN: CPT | Mod: LT,,, | Performed by: FAMILY MEDICINE

## 2022-02-11 PROCEDURE — 25000003 PHARM REV CODE 250: Performed by: FAMILY MEDICINE

## 2022-02-11 PROCEDURE — 27000716 HC OXISENSOR PROBE, ANY SIZE: Performed by: ANESTHESIOLOGY

## 2022-02-11 PROCEDURE — 37000008 HC ANESTHESIA 1ST 15 MINUTES: Performed by: FAMILY MEDICINE

## 2022-02-11 PROCEDURE — 27000284 HC CANNULA NASAL: Performed by: ANESTHESIOLOGY

## 2022-02-11 PROCEDURE — D9220A PRA ANESTHESIA: Mod: CRNA,,, | Performed by: NURSE ANESTHETIST, CERTIFIED REGISTERED

## 2022-02-11 RX ORDER — MEPERIDINE HYDROCHLORIDE 25 MG/ML
25 INJECTION INTRAMUSCULAR; INTRAVENOUS; SUBCUTANEOUS EVERY 10 MIN PRN
Status: DISCONTINUED | OUTPATIENT
Start: 2022-02-11 | End: 2022-02-11 | Stop reason: HOSPADM

## 2022-02-11 RX ORDER — MUPIROCIN 20 MG/G
OINTMENT TOPICAL
Status: DISCONTINUED | OUTPATIENT
Start: 2022-02-11 | End: 2022-02-11 | Stop reason: HOSPADM

## 2022-02-11 RX ORDER — ONDANSETRON 2 MG/ML
4 INJECTION INTRAMUSCULAR; INTRAVENOUS DAILY PRN
Status: DISCONTINUED | OUTPATIENT
Start: 2022-02-11 | End: 2022-02-11 | Stop reason: HOSPADM

## 2022-02-11 RX ORDER — DIPHENHYDRAMINE HYDROCHLORIDE 50 MG/ML
25 INJECTION INTRAMUSCULAR; INTRAVENOUS EVERY 6 HOURS PRN
Status: DISCONTINUED | OUTPATIENT
Start: 2022-02-11 | End: 2022-02-11 | Stop reason: HOSPADM

## 2022-02-11 RX ORDER — HYDROMORPHONE HYDROCHLORIDE 2 MG/ML
0.5 INJECTION, SOLUTION INTRAMUSCULAR; INTRAVENOUS; SUBCUTANEOUS EVERY 5 MIN PRN
Status: DISCONTINUED | OUTPATIENT
Start: 2022-02-11 | End: 2022-02-11 | Stop reason: HOSPADM

## 2022-02-11 RX ORDER — MORPHINE SULFATE 10 MG/ML
4 INJECTION INTRAMUSCULAR; INTRAVENOUS; SUBCUTANEOUS EVERY 5 MIN PRN
Status: DISCONTINUED | OUTPATIENT
Start: 2022-02-11 | End: 2022-02-11 | Stop reason: HOSPADM

## 2022-02-11 RX ORDER — OXYCODONE HYDROCHLORIDE 5 MG/1
5 TABLET ORAL
Status: DISCONTINUED | OUTPATIENT
Start: 2022-02-11 | End: 2022-02-11 | Stop reason: HOSPADM

## 2022-02-11 RX ORDER — LIDOCAINE HYDROCHLORIDE 20 MG/ML
INJECTION, SOLUTION EPIDURAL; INFILTRATION; INTRACAUDAL; PERINEURAL
Status: DISCONTINUED | OUTPATIENT
Start: 2022-02-11 | End: 2022-02-11

## 2022-02-11 RX ORDER — PROPOFOL 10 MG/ML
VIAL (ML) INTRAVENOUS
Status: DISCONTINUED | OUTPATIENT
Start: 2022-02-11 | End: 2022-02-11

## 2022-02-11 RX ORDER — SODIUM CHLORIDE 9 MG/ML
30 INJECTION, SOLUTION INTRAVENOUS CONTINUOUS
Status: DISCONTINUED | OUTPATIENT
Start: 2022-02-11 | End: 2022-02-11 | Stop reason: HOSPADM

## 2022-02-11 RX ADMIN — PROPOFOL 50 MG: 10 INJECTION, EMULSION INTRAVENOUS at 07:02

## 2022-02-11 RX ADMIN — PROPOFOL 100 MG: 10 INJECTION, EMULSION INTRAVENOUS at 07:02

## 2022-02-11 RX ADMIN — LIDOCAINE HYDROCHLORIDE 100 MG: 20 INJECTION, SOLUTION INTRAVENOUS at 07:02

## 2022-02-11 RX ADMIN — SODIUM CHLORIDE 30 ML/HR: 9 INJECTION, SOLUTION INTRAVENOUS at 06:02

## 2022-02-11 RX ADMIN — SODIUM BICARBONATE: 84 INJECTION, SOLUTION INTRAVENOUS at 07:02

## 2022-02-11 NOTE — ANESTHESIA PREPROCEDURE EVALUATION
02/11/2022  Papo Bell is a 51 y.o., male.    Anesthesia Evaluation    I have reviewed the Patient Summary Reports.    I have reviewed the Nursing Notes. I have reviewed the NPO Status.   I have reviewed the Medications.     Review of Systems  Anesthesia Hx:  No problems with previous Anesthesia    Social:  Non-Smoker, No Alcohol Use    Hematology/Oncology:  Hematology Normal   Oncology Normal     EENT/Dental:EENT/Dental Normal   Cardiovascular:   Hypertension  Chronic Venous Insufficiency, bilateral lower extremity    Pulmonary:  Pulmonary Normal    Renal/:  Renal/ Normal     Hepatic/GI:   GERD    Musculoskeletal:   Arthritis     Neurological:   Neuromuscular Disease,   Chronic Pain Syndrome   Endocrine:   Diabetes    Dermatological:  Skin Normal    Psych:  Psychiatric Normal           Physical Exam  General:  Morbid Obesity    Airway/Jaw/Neck:  Airway Findings: Mouth Opening: Normal Mallampati: II     Eyes/Ears/Nose:  Eyes/Ears/Nose Findings:     Chest/Lungs:  Chest/Lungs Findings:        Mental Status:  Mental Status Findings:  Cooperative, Alert and Oriented         Anesthesia Plan  Type of Anesthesia, risks & benefits discussed:  Anesthesia Type:  general, MAC    Patient's Preference:   Plan Factors:          Intra-op Monitoring Plan: standard ASA monitors  Intra-op Monitoring Plan Comments:   Post Op Pain Control Plan: per primary service following discharge from PACU and multimodal analgesia  Post Op Pain Control Plan Comments:     Induction:   IV  Beta Blocker:  Patient is not currently on a Beta-Blocker (No further documentation required).       Informed Consent: Patient understands risks and agrees with Anesthesia plan.  Questions answered. Anesthesia consent signed with patient.  ASA Score: 3     Day of Surgery Review of History & Physical: I have interviewed and examined the patient. I have  reviewed the patient's H&P dated:  There are no significant changes.          Ready For Surgery From Anesthesia Perspective.

## 2022-02-11 NOTE — OP NOTE
Procedure:  Endovenous radio frequency ablation of the Left great saphenous vein(s) of the lower extremity.    Preprocedure and postprocedure diagnosis: Symptomatic varicose veins and venous insufficiency of the Left leg and other complications to include leg pain, leg edema and chronic skin changes.  The patient has previously failed conservative therapy consider a C4 chronic venous disease with venous hypertension.    Anesthesia: Local infiltration with monitored anesthesia care.  Estimated blood loss 5 cc.  Specimens removed none.   Complications none.  Implants or grafts: none.    Findings:  A total of 5 cycles of radiofrequency ablation was used to treat 47.5 cm of vein over 1 minute 40 sec.  Maximum diameter of the vein treated is 8.2 mm with a maximum reflux time of 0.8 seconds.    Procedure detail:  After patient identification, surgical site verification, and marked symptomatic Left leg, the patient was taken to the procedure room and placed in the supine position.  Monitored anesthesia care was induced.  The patient was prepped and draped in the normal sterile fashion leaving the Left leg exposed.  A time-out performed identifying the patient and the correct surgical site.  Tumescent anesthesia was then infiltrated at the site of expected cannulation of the Left great saphenous vein.  The Left great saphenous vein was then cannulated using the modified Seldinger technique to place a 6 Malay sheath.  I then advanced a 10x60 cm radiofrequency ablation catheter through the sheath up the saphenous vein to approximately 2 cm distal to the epigastric vein.  Tumescence anesthesia was then infiltrated around the vein to be treated.  The patient was placed in the head-down position.  Radiofrequency ablation of the Left great saphenous vein was then performed using a total of 5 cycles of radiofrequency ablation to treat 47.5 cm of vein over 1 minute 40sec.  After this was done the catheter and sheath were removed  and noted to be intact.  Completion ultrasound revealed a widely patent femoral vein, sapheno femoral junction and an ablated great saphenous vein.  The patient's leg was then cleaned and dried.  Sterile dressings and wraps were applied.  The patient was transported to the recovery room in stable condition.

## 2022-02-11 NOTE — INTERVAL H&P NOTE
The patient has been examined and the H&P has been reviewed:    I concur with the findings and no changes have occurred since H&P was written.    Surgery risks, benefits and alternative options discussed and understood by patient/family for a left great saphenous vein radiofrequency ablation.          There are no hospital problems to display for this patient.

## 2022-02-11 NOTE — OR NURSING
0811 received from OR    0815 glucose 110    0840 recovery complete, return to asc 23    0850 report given to cayla farah, 100% room air; 92; 16; 137/80. Family at bedside.

## 2022-02-11 NOTE — TRANSFER OF CARE
"Anesthesia Transfer of Care Note    Patient: Papo Bell    Procedure(s) Performed: Procedure(s) (LRB):  Left GSV RF Ablation (Left)    Patient location: PACU    Anesthesia Type: general    Transport from OR: Transported from OR on room air with adequate spontaneous ventilation    Post pain: adequate analgesia    Post assessment: no apparent anesthetic complications    Post vital signs: stable    Level of consciousness: awake and alert    Nausea/Vomiting: no nausea/vomiting    Complications: none    Transfer of care protocol was followed      Last vitals:   Visit Vitals  /64   Pulse 93   Temp 36.3 °C (97.4 °F) (Oral)   Resp 17   Ht 5' 8" (1.727 m)   Wt 132.5 kg (292 lb)   SpO2 100%   BMI 44.40 kg/m²     "

## 2022-02-15 ENCOUNTER — OFFICE VISIT (OUTPATIENT)
Dept: NEUROLOGY | Facility: CLINIC | Age: 52
End: 2022-02-15
Payer: COMMERCIAL

## 2022-02-15 VITALS
HEIGHT: 68 IN | OXYGEN SATURATION: 98 % | RESPIRATION RATE: 18 BRPM | DIASTOLIC BLOOD PRESSURE: 78 MMHG | SYSTOLIC BLOOD PRESSURE: 124 MMHG | HEART RATE: 100 BPM | WEIGHT: 292 LBS | BODY MASS INDEX: 44.25 KG/M2

## 2022-02-15 DIAGNOSIS — G56.21 ULNAR NERVE COMPRESSION, RIGHT: ICD-10-CM

## 2022-02-15 DIAGNOSIS — G56.22 ULNAR NERVE COMPRESSION, LEFT: ICD-10-CM

## 2022-02-15 DIAGNOSIS — G56.03 CARPAL TUNNEL SYNDROME ON BOTH SIDES: Primary | ICD-10-CM

## 2022-02-15 PROCEDURE — 3078F PR MOST RECENT DIASTOLIC BLOOD PRESSURE < 80 MM HG: ICD-10-PCS | Mod: ,,, | Performed by: NURSE PRACTITIONER

## 2022-02-15 PROCEDURE — 3074F SYST BP LT 130 MM HG: CPT | Mod: ,,, | Performed by: NURSE PRACTITIONER

## 2022-02-15 PROCEDURE — 99212 PR OFFICE/OUTPT VISIT, EST, LEVL II, 10-19 MIN: ICD-10-PCS | Mod: S$PBB,,, | Performed by: NURSE PRACTITIONER

## 2022-02-15 PROCEDURE — 1159F PR MEDICATION LIST DOCUMENTED IN MEDICAL RECORD: ICD-10-PCS | Mod: ,,, | Performed by: NURSE PRACTITIONER

## 2022-02-15 PROCEDURE — 99212 OFFICE O/P EST SF 10 MIN: CPT | Mod: S$PBB,,, | Performed by: NURSE PRACTITIONER

## 2022-02-15 PROCEDURE — 99215 OFFICE O/P EST HI 40 MIN: CPT | Mod: PBBFAC | Performed by: NURSE PRACTITIONER

## 2022-02-15 PROCEDURE — 3008F PR BODY MASS INDEX (BMI) DOCUMENTED: ICD-10-PCS | Mod: ,,, | Performed by: NURSE PRACTITIONER

## 2022-02-15 PROCEDURE — 1160F RVW MEDS BY RX/DR IN RCRD: CPT | Mod: ,,, | Performed by: NURSE PRACTITIONER

## 2022-02-15 PROCEDURE — 3074F PR MOST RECENT SYSTOLIC BLOOD PRESSURE < 130 MM HG: ICD-10-PCS | Mod: ,,, | Performed by: NURSE PRACTITIONER

## 2022-02-15 PROCEDURE — 1159F MED LIST DOCD IN RCRD: CPT | Mod: ,,, | Performed by: NURSE PRACTITIONER

## 2022-02-15 PROCEDURE — 3008F BODY MASS INDEX DOCD: CPT | Mod: ,,, | Performed by: NURSE PRACTITIONER

## 2022-02-15 PROCEDURE — 3078F DIAST BP <80 MM HG: CPT | Mod: ,,, | Performed by: NURSE PRACTITIONER

## 2022-02-15 PROCEDURE — 1160F PR REVIEW ALL MEDS BY PRESCRIBER/CLIN PHARMACIST DOCUMENTED: ICD-10-PCS | Mod: ,,, | Performed by: NURSE PRACTITIONER

## 2022-02-15 NOTE — PATIENT INSTRUCTIONS
1. Carpal tunnel braces to both wrists at night when sleeping    2. Start neurontin 100mg three times daily    3. Keep surgery appointment for Dr. Crocker

## 2022-02-15 NOTE — PROGRESS NOTES
Subjective:       Patient ID: Papo Bell is a 51 y.o. male     Chief Complaint:    Chief Complaint   Patient presents with    Follow-up        Allergies:  Patient has no known allergies.    Current Medications:    Outpatient Encounter Medications as of 2/15/2022   Medication Sig Dispense Refill    amLODIPine (NORVASC) 10 MG tablet TAKE 1 TABLET DAILY 90 tablet 1    atorvastatin (LIPITOR) 20 MG tablet TAKE 1 TABLET EVERY EVENING 90 tablet 1    diclofenac (VOLTAREN) 75 MG EC tablet       ergocalciferol (ERGOCALCIFEROL) 50,000 unit Cap Take 1 capsule by mouth weekly for 12 weeks then take 1 capsule monthly after that 12 capsule 2    gabapentin (NEURONTIN) 100 MG capsule Take 1 capsule (100 mg total) by mouth 3 (three) times daily. 90 capsule 11    hydroCHLOROthiazide (MICROZIDE) 12.5 mg capsule       KLOR-CON 8 8 mEq TbSR       metFORMIN (GLUCOPHAGE) 500 MG tablet TAKE 1 TABLET DAILY WITH   BREAKFAST 90 tablet 1    nabumetone (RELAFEN) 750 MG tablet TAKE 1-2 TABLETS BY MOUTH DAILY WITH FOOD IF NEEDED FOR PAIN.      omega 3-dha-epa-fish oil (FISH OIL) 1,000 mg (120 mg-180 mg) Cap Take 1 capsule by mouth once daily. 90 capsule 3    omeprazole (PRILOSEC) 40 MG capsule TAKE 1 CAPSULE EVERY       MORNING 90 capsule 1    potassium chloride SA (K-DUR,KLOR-CON) 20 MEQ tablet Take 1 tablet (20 mEq total) by mouth once daily. 30 tablet 5    timolol maleate 0.5% (TIMOPTIC) 0.5 % Drop PUT 1 DROP INTO AFFECTED EYE(S) EVERY MORNING      traMADoL (ULTRAM) 50 mg tablet TAKE 1-2 TABLETS BY MOUTH 3 TIMES A DAY FOR PAIN.      acetaminophen-caff-dihydrocod 320.5-30-16 mg Cap       omega-3 acid ethyl esters (LOVAZA) 1 gram capsule       SUPARTZ FX 10 mg/mL injection        No facility-administered encounter medications on file as of 2/15/2022.       History of Present Illness  52 y/o AAM following in clinic for finding of bilateral carpal tunnel syndrome.    Has peripheral vascular disease, followed by   [facility].    EMG/NCS done 11/15/21 showed moderately severe left and mild right carpal tunnel.  There was also noted mild to moderate bilateral ulnar nerve neuropathies as well.  He is to have surgery per Dr. Crocker in two days.         Review of Systems  Review of Systems   Constitutional: Negative for diaphoresis and fever.   HENT: Negative for congestion, hearing loss and tinnitus.    Eyes: Negative for blurred vision, double vision, photophobia, discharge and redness.   Respiratory: Negative for cough and shortness of breath.    Cardiovascular: Negative for chest pain.   Gastrointestinal: Negative for abdominal pain, nausea and vomiting.   Musculoskeletal: Negative for back pain, joint pain, myalgias and neck pain.   Skin: Negative for itching and rash.   Neurological: Positive for tingling and sensory change. Negative for dizziness, tremors, speech change, focal weakness, seizures, loss of consciousness, weakness and headaches.   Psychiatric/Behavioral: Negative for depression, hallucinations and memory loss. The patient does not have insomnia.    All other systems reviewed and are negative.     Objective:     NEUROLOGICAL EXAMINATION:     MENTAL STATUS   Oriented to person, place, and time.   Registration: recalls 3 of 3 objects. Recall at 5 minutes: recalls 3 of 3 objects.   Attention: normal. Concentration: normal.   Speech: speech is normal   Level of consciousness: alert  Knowledge: good and consistent with education.   Normal comprehension.     CRANIAL NERVES     CN II   Visual fields full to confrontation.   Visual acuity: normal  Right visual field deficit: none  Left visual field deficit: none     CN III, IV, VI   Pupils are equal, round, and reactive to light.  Extraocular motions are normal.   Right pupil: Size: 3 mm. Shape: regular. Reactivity: brisk. Consensual response: intact. Accommodation: intact.   Left pupil: Size: 3 mm. Shape: regular. Reactivity: brisk. Consensual response: intact.  Accommodation: intact.   CN III: no CN III palsy  CN VI: no CN VI palsy  Nystagmus: none   Diplopia: none  Upgaze: normal  Downgaze: normal  Conjugate gaze: present  Vestibulo-ocular reflex: present    CN V   Facial sensation intact.   Right facial sensation deficit: none  Left facial sensation deficit: none  Right corneal reflex: normal  Left corneal reflex: normal    CN VII   Facial expression full, symmetric.   Right facial weakness: none  Left facial weakness: none  Right taste: normal  Left taste: normal    CN VIII   CN VIII normal.   Hearing: intact    CN IX, X   CN IX normal.   CN X normal.   Palate: symmetric    CN XI   CN XI normal.   Right sternocleidomastoid strength: normal  Left sternocleidomastoid strength: normal  Right trapezius strength: normal  Left trapezius strength: normal    CN XII   CN XII normal.   Tongue: not atrophic  Fasciculations: absent  Tongue deviation: none    MOTOR EXAM   Muscle bulk: normal  Overall muscle tone: normal  Right arm tone: normal  Left arm tone: normal  Right arm pronator drift: absent  Left arm pronator drift: absent  Right leg tone: normal  Left leg tone: normal    Strength   Right neck flexion: 5/5  Left neck flexion: 5/5  Right neck extension: 5/5  Left neck extension: 5/5  Right deltoid: 5/5  Left deltoid: 5/5  Right biceps: 5/5  Left biceps: 5/5  Right triceps: 5/5  Left triceps: 5/5  Right wrist flexion: 5/5  Left wrist flexion: 5/5  Right wrist extension: 5/5  Left wrist extension: 5/5  Right interossei: 5/5  Left interossei: 5/5  Right iliopsoas: 5/5  Left iliopsoas: 5/5  Right quadriceps: 5/5  Left quadriceps: 5/5  Right hamstrin/5  Left hamstrin/5  Right anterior tibial: 5/5  Left anterior tibial: 5/5  Right posterior tibial: 5/5  Left posterior tibial: 5/5  Right gastroc: 5/5  Left gastroc: 5/5    REFLEXES     Reflexes   Right brachioradialis: 2+  Left brachioradialis: 2+  Right biceps: 2+  Left biceps: 2+  Right triceps: 2+  Left triceps: 2+  Right  patellar: 2+  Left patellar: 2+  Right achilles: 2+  Left achilles: 2+  Right : 2+  Left : 2+  Right plantar: normal  Left plantar: normal  Right Moss: absent  Left Moss: absent  Right ankle clonus: absent  Left ankle clonus: absent  Right pendular knee jerk: absent  Left pendular knee jerk: absent    SENSORY EXAM   Light touch normal.   Right arm light touch: normal  Left arm light touch: normal  Right leg light touch: normal  Left leg light touch: normal  Vibration normal.   Right arm vibration: normal  Left arm vibration: normal  Right leg vibration: normal  Left leg vibration: normal  Proprioception normal.   Right arm proprioception: normal  Left arm proprioception: normal  Right leg proprioception: normal  Left leg proprioception: normal  Right arm pinprick: decreased from fingers  Left arm pinprick: decreased from fingers  Right leg pinprick: normal  Left leg pinprick: normal  Graphesthesia: normal  Romberg: negative  Stereognosis: normal    GAIT AND COORDINATION     Gait  Gait: normal     Coordination   Finger to nose coordination: normal  Heel to shin coordination: normal  Tandem walking coordination: normal    Tremor   Resting tremor: absent  Intention tremor: absent  Action tremor: absent       Physical Exam  Vitals and nursing note reviewed.   Constitutional:       Appearance: Normal appearance.   HENT:      Head: Normocephalic.   Eyes:      Extraocular Movements: EOM normal.      Pupils: Pupils are equal, round, and reactive to light.   Cardiovascular:      Rate and Rhythm: Normal rate and regular rhythm.      Pulses: Normal pulses.      Heart sounds: Normal heart sounds.   Pulmonary:      Effort: Pulmonary effort is normal.      Breath sounds: Normal breath sounds.   Musculoskeletal:         General: Normal range of motion.      Cervical back: Normal range of motion and neck supple.   Skin:     General: Skin is warm and dry.   Neurological:      General: No focal deficit present.       Mental Status: He is alert and oriented to person, place, and time.      Cranial Nerves: No cranial nerve deficit.      Sensory: No sensory deficit.      Motor: No weakness.      Coordination: Coordination normal. Finger-Nose-Finger Test, Heel to Shin Test and Romberg Test normal.      Gait: Gait is intact. Gait and tandem walk normal.      Deep Tendon Reflexes: Reflexes normal.      Reflex Scores:       Tricep reflexes are 2+ on the right side and 2+ on the left side.       Bicep reflexes are 2+ on the right side and 2+ on the left side.       Brachioradialis reflexes are 2+ on the right side and 2+ on the left side.       Patellar reflexes are 2+ on the right side and 2+ on the left side.       Achilles reflexes are 2+ on the right side and 2+ on the left side.  Psychiatric:         Mood and Affect: Mood normal.         Speech: Speech normal.         Behavior: Behavior normal.          Assessment:     Problem List Items Addressed This Visit        Neuro    Carpal tunnel syndrome on both sides - Primary    Ulnar nerve compression, left    Ulnar nerve compression, right           Primary Diagnosis and ICD10  Carpal tunnel syndrome on both sides [G56.03]    Plan:     Patient Instructions   1. Carpal tunnel braces to both wrists at night when sleeping    2. Start neurontin 100mg three times daily    3. Keep surgery appointment for Dr. Crocker      There are no discontinued medications.    Requested Prescriptions      No prescriptions requested or ordered in this encounter       No orders of the defined types were placed in this encounter.

## 2022-02-16 ENCOUNTER — OFFICE VISIT (OUTPATIENT)
Dept: VASCULAR SURGERY | Facility: CLINIC | Age: 52
End: 2022-02-16
Payer: COMMERCIAL

## 2022-02-16 ENCOUNTER — HOSPITAL ENCOUNTER (OUTPATIENT)
Dept: RADIOLOGY | Facility: HOSPITAL | Age: 52
Discharge: HOME | End: 2022-02-16
Attending: FAMILY MEDICINE
Payer: COMMERCIAL

## 2022-02-16 VITALS
HEIGHT: 68 IN | BODY MASS INDEX: 42.89 KG/M2 | WEIGHT: 283 LBS | RESPIRATION RATE: 20 BRPM | DIASTOLIC BLOOD PRESSURE: 90 MMHG | SYSTOLIC BLOOD PRESSURE: 130 MMHG | HEART RATE: 120 BPM

## 2022-02-16 DIAGNOSIS — R60.0 EDEMA, LOWER EXTREMITY: ICD-10-CM

## 2022-02-16 DIAGNOSIS — R23.8 OTHER SKIN CHANGES: ICD-10-CM

## 2022-02-16 DIAGNOSIS — L81.9 HYPERPIGMENTATION OF SKIN: ICD-10-CM

## 2022-02-16 DIAGNOSIS — I87.2 VENOUS INSUFFICIENCY (CHRONIC) (PERIPHERAL): ICD-10-CM

## 2022-02-16 DIAGNOSIS — I87.2 VENOUS INSUFFICIENCY: Primary | ICD-10-CM

## 2022-02-16 PROCEDURE — 99214 OFFICE O/P EST MOD 30 MIN: CPT | Mod: S$PBB,,, | Performed by: NURSE PRACTITIONER

## 2022-02-16 PROCEDURE — 3008F PR BODY MASS INDEX (BMI) DOCUMENTED: ICD-10-PCS | Mod: ,,, | Performed by: NURSE PRACTITIONER

## 2022-02-16 PROCEDURE — 3008F BODY MASS INDEX DOCD: CPT | Mod: ,,, | Performed by: NURSE PRACTITIONER

## 2022-02-16 PROCEDURE — 99214 PR OFFICE/OUTPT VISIT, EST, LEVL IV, 30-39 MIN: ICD-10-PCS | Mod: S$PBB,,, | Performed by: NURSE PRACTITIONER

## 2022-02-16 PROCEDURE — 1159F MED LIST DOCD IN RCRD: CPT | Mod: ,,, | Performed by: NURSE PRACTITIONER

## 2022-02-16 PROCEDURE — 93971 EXTREMITY STUDY: CPT | Mod: TC

## 2022-02-16 PROCEDURE — 3075F SYST BP GE 130 - 139MM HG: CPT | Mod: ,,, | Performed by: NURSE PRACTITIONER

## 2022-02-16 PROCEDURE — 3080F PR MOST RECENT DIASTOLIC BLOOD PRESSURE >= 90 MM HG: ICD-10-PCS | Mod: ,,, | Performed by: NURSE PRACTITIONER

## 2022-02-16 PROCEDURE — 93971 US POST ABLATION VENOUS: ICD-10-PCS | Mod: 26,,, | Performed by: FAMILY MEDICINE

## 2022-02-16 PROCEDURE — 3075F PR MOST RECENT SYSTOLIC BLOOD PRESS GE 130-139MM HG: ICD-10-PCS | Mod: ,,, | Performed by: NURSE PRACTITIONER

## 2022-02-16 PROCEDURE — 93971 EXTREMITY STUDY: CPT | Mod: 26,,, | Performed by: FAMILY MEDICINE

## 2022-02-16 PROCEDURE — 99214 OFFICE O/P EST MOD 30 MIN: CPT | Mod: PBBFAC,25 | Performed by: NURSE PRACTITIONER

## 2022-02-16 PROCEDURE — 3080F DIAST BP >= 90 MM HG: CPT | Mod: ,,, | Performed by: NURSE PRACTITIONER

## 2022-02-16 PROCEDURE — 1159F PR MEDICATION LIST DOCUMENTED IN MEDICAL RECORD: ICD-10-PCS | Mod: ,,, | Performed by: NURSE PRACTITIONER

## 2022-02-16 NOTE — HPI
51-year-old male with left ulnar nerve entrapment carpal tunnel syndrome needing left ulnar nerve transposition carpal tunnel release  Left upper extremity has decreased sensation in the ulnar and median distribution he is able to abduct and adduct his fingers but is there week he can weakly oppose his thumb to his little finger he has palpable pulse at the wrist full motion in the wrist and hand  X-rays show no fracture subluxation left wrist  EMG is positive for left carpal tunnel syndrome left ulnar nerve entrapment at the elbow  Impression left ulnar nerve entrapment at the elbow with left carpal tunnel syndrome  Plan left ulnar nerve transposition at the elbow with carpal tunnel release

## 2022-02-16 NOTE — PROGRESS NOTES
[facility] CLINIC NOTE    Patient ID: Papo Bell is a 51 y.o. male.    I. HISTORY     Chief Complaint:   Chief Complaint   Patient presents with    Follow-up     5 days s/p Left GSV RF Ablation. Suture removed x 1 from left lower leg and steri strip applied.       HPI: Papo Bell is a 51 y.o. male who presents today for a 5 day f/u s/p left GSV RF ablation. Patient states that his leg symptoms are some improved but not completely resolved. He continues to wear compression daily, elevates his legs periodically and performs calf pumping exercises. No open ulcers. No history of DVT.     Vein procedure:   Lt. GSV RF ablation 2-      Past Medical History:   Diagnosis Date    BMI 45.0-49.9, adult 06/20/2018    Cervicalgia 04/07/2021    Diabetes mellitus, type 2     DJD (degenerative joint disease) 03/15/2019    Edema 01/27/2021    Essential hypertension 04/07/2021    GERD (gastroesophageal reflux disease) 01/13/2021    Hyperlipidemia     Hypertension     Low back pain 06/19/2019    Osteoporosis     Pain in right shoulder 04/07/2021    Tachycardia 04/07/2021        Past Surgical History:   Procedure Laterality Date    PATELLA TENDONS REPAIR Bilateral     RADIOFREQUENCY ABLATION Left 2/11/2022    Procedure: Left GSV RF Ablation;  Surgeon: Collins Canada DO;  Location: [facility];  Service: Vascular;  Laterality: Left;    TORN MENISCUS Left     WRIST FRACTURE SURGERY Left        Social History     Tobacco Use   Smoking Status Never Smoker   Smokeless Tobacco Never Used         Current Outpatient Medications:     acetaminophen-caff-dihydrocod 320.5-30-16 mg Cap, , Disp: , Rfl:     amLODIPine (NORVASC) 10 MG tablet, TAKE 1 TABLET DAILY, Disp: 90 tablet, Rfl: 1    atorvastatin (LIPITOR) 20 MG tablet, TAKE 1 TABLET EVERY EVENING, Disp: 90 tablet, Rfl: 1    diclofenac (VOLTAREN) 75 MG EC tablet, , Disp: , Rfl:     ergocalciferol (ERGOCALCIFEROL) 50,000 unit Cap, Take 1 capsule by mouth  weekly for 12 weeks then take 1 capsule monthly after that, Disp: 12 capsule, Rfl: 2    gabapentin (NEURONTIN) 100 MG capsule, Take 1 capsule (100 mg total) by mouth 3 (three) times daily., Disp: 90 capsule, Rfl: 11    hydroCHLOROthiazide (MICROZIDE) 12.5 mg capsule, , Disp: , Rfl:     KLOR-CON 8 8 mEq TbSR, , Disp: , Rfl:     metFORMIN (GLUCOPHAGE) 500 MG tablet, TAKE 1 TABLET DAILY WITH   BREAKFAST, Disp: 90 tablet, Rfl: 1    nabumetone (RELAFEN) 750 MG tablet, TAKE 1-2 TABLETS BY MOUTH DAILY WITH FOOD IF NEEDED FOR PAIN., Disp: , Rfl:     omega 3-dha-epa-fish oil (FISH OIL) 1,000 mg (120 mg-180 mg) Cap, Take 1 capsule by mouth once daily., Disp: 90 capsule, Rfl: 3    omega-3 acid ethyl esters (LOVAZA) 1 gram capsule, , Disp: , Rfl:     omeprazole (PRILOSEC) 40 MG capsule, TAKE 1 CAPSULE EVERY       MORNING, Disp: 90 capsule, Rfl: 1    potassium chloride SA (K-DUR,KLOR-CON) 20 MEQ tablet, Take 1 tablet (20 mEq total) by mouth once daily., Disp: 30 tablet, Rfl: 5    SUPARTZ FX 10 mg/mL injection, , Disp: , Rfl:     timolol maleate 0.5% (TIMOPTIC) 0.5 % Drop, PUT 1 DROP INTO AFFECTED EYE(S) EVERY MORNING, Disp: , Rfl:     traMADoL (ULTRAM) 50 mg tablet, TAKE 1-2 TABLETS BY MOUTH 3 TIMES A DAY FOR PAIN., Disp: , Rfl:     Review of Systems   Constitutional: Negative for fatigue and fever.   Eyes: Negative for pain.   Respiratory: Negative for chest tightness and shortness of breath.    Cardiovascular: Positive for leg swelling. Negative for chest pain.   Gastrointestinal: Negative for abdominal pain.   Musculoskeletal: Negative for leg pain.        Chronic darker skin changes bilaterally   Neurological: Positive for numbness. Negative for syncope.   Psychiatric/Behavioral: Negative for sleep disturbance.          II. PHYSICAL EXAM     Physical Exam  Vitals reviewed.   Constitutional:       General: He is not in acute distress.     Appearance: He is obese.   HENT:      Head: Normocephalic.   Eyes:       Pupils: Pupils are equal, round, and reactive to light.   Cardiovascular:      Rate and Rhythm: Regular rhythm. Tachycardia present.   Pulmonary:      Effort: Pulmonary effort is normal.   Abdominal:      Palpations: Abdomen is soft.   Musculoskeletal:         General: No swelling. Normal range of motion.      Cervical back: Neck supple.      Left lower leg: Edema present.      Comments: Mild left leg swelling   Skin:     General: Skin is warm and dry.   Neurological:      Mental Status: He is alert and oriented to person, place, and time.           Left saphenofemoral junction and common femoral vein appeared to be widely patent with no evidence of EHIT phenomena or thrombosis.  The left GSV appears to be ablated as expected.      III. ASSESSMENT & PLAN (MEDICAL DECISION MAKING)     1. Venous insufficiency    2. Other skin changes    3. Hyperpigmentation of skin    4. Edema, lower extremity          Assessment/Diagnosis and Plan:  The patient is doing well post ablation.    - Recommend continued compression with 20-30mmHg Rx stockings  - Therapeutic leg elevation  - Calf pumping exercises  - RTC 1 months for further evaluation, no PA US needed unless he's having problems. Patient is low risk.       KIRK Shi

## 2022-02-16 NOTE — H&P
[facility] - Orthopedic  Orthopedics  H&P    Patient Name: Papo Bell  MRN: [id number]  Admission Date: (Not on file)  Primary Care Provider: Huey Mtz DNP, RYNE    Patient information was obtained from patient and past medical records.     Subjective:     Principal Problem:<principal problem not specified>    Chief Complaint: No chief complaint on file.       HPI: 51-year-old male with left ulnar nerve entrapment carpal tunnel syndrome needing left ulnar nerve transposition carpal tunnel release  Left upper extremity has decreased sensation in the ulnar and median distribution he is able to abduct and adduct his fingers but is there week he can weakly oppose his thumb to his little finger he has palpable pulse at the wrist full motion in the wrist and hand  X-rays show no fracture subluxation left wrist  EMG is positive for left carpal tunnel syndrome left ulnar nerve entrapment at the elbow  Impression left ulnar nerve entrapment at the elbow with left carpal tunnel syndrome  Plan left ulnar nerve transposition at the elbow with carpal tunnel release      Past Medical History:   Diagnosis Date    BMI 45.0-49.9, adult 06/20/2018    Cervicalgia 04/07/2021    Diabetes mellitus, type 2     DJD (degenerative joint disease) 03/15/2019    Edema 01/27/2021    Essential hypertension 04/07/2021    GERD (gastroesophageal reflux disease) 01/13/2021    Hyperlipidemia     Hypertension     Low back pain 06/19/2019    Osteoporosis     Pain in right shoulder 04/07/2021    Tachycardia 04/07/2021       Past Surgical History:   Procedure Laterality Date    PATELLA TENDONS REPAIR Bilateral     RADIOFREQUENCY ABLATION Left 2/11/2022    Procedure: Left GSV RF Ablation;  Surgeon: Collins Canada DO;  Location: [facility];  Service: Vascular;  Laterality: Left;    TORN MENISCUS Left     WRIST FRACTURE SURGERY Left        Review of patient's allergies indicates:  No Known Allergies    No current  facility-administered medications for this encounter.     Current Outpatient Medications   Medication Sig    acetaminophen-caff-dihydrocod 320.5-30-16 mg Cap     amLODIPine (NORVASC) 10 MG tablet TAKE 1 TABLET DAILY    atorvastatin (LIPITOR) 20 MG tablet TAKE 1 TABLET EVERY EVENING    diclofenac (VOLTAREN) 75 MG EC tablet     ergocalciferol (ERGOCALCIFEROL) 50,000 unit Cap Take 1 capsule by mouth weekly for 12 weeks then take 1 capsule monthly after that    gabapentin (NEURONTIN) 100 MG capsule Take 1 capsule (100 mg total) by mouth 3 (three) times daily.    hydroCHLOROthiazide (MICROZIDE) 12.5 mg capsule     KLOR-CON 8 8 mEq TbSR     metFORMIN (GLUCOPHAGE) 500 MG tablet TAKE 1 TABLET DAILY WITH   BREAKFAST    nabumetone (RELAFEN) 750 MG tablet TAKE 1-2 TABLETS BY MOUTH DAILY WITH FOOD IF NEEDED FOR PAIN.    omega 3-dha-epa-fish oil (FISH OIL) 1,000 mg (120 mg-180 mg) Cap Take 1 capsule by mouth once daily.    omega-3 acid ethyl esters (LOVAZA) 1 gram capsule     omeprazole (PRILOSEC) 40 MG capsule TAKE 1 CAPSULE EVERY       MORNING    potassium chloride SA (K-DUR,KLOR-CON) 20 MEQ tablet Take 1 tablet (20 mEq total) by mouth once daily.    SUPARTZ FX 10 mg/mL injection     timolol maleate 0.5% (TIMOPTIC) 0.5 % Drop PUT 1 DROP INTO AFFECTED EYE(S) EVERY MORNING    traMADoL (ULTRAM) 50 mg tablet TAKE 1-2 TABLETS BY MOUTH 3 TIMES A DAY FOR PAIN.     Family History     Problem Relation (Age of Onset)    Diabetes Mother, Father    Hypertension Father, Brother    No Known Problems Brother, Brother, Daughter, Daughter, Daughter, Son, Son        Tobacco Use    Smoking status: Never Smoker    Smokeless tobacco: Never Used   Substance and Sexual Activity    Alcohol use: Yes    Drug use: Never    Sexual activity: Yes     Partners: Female     Review of Systems   Constitutional: Negative for decreased appetite.   HENT: Negative for congestion and ear discharge.    Eyes: Negative for blurred vision.    Cardiovascular: Negative for chest pain and syncope.   Respiratory: Negative for cough and wheezing.    Endocrine: Negative for cold intolerance and polyuria.   Hematologic/Lymphatic: Negative for adenopathy and bleeding problem.   Skin: Negative for color change, nail changes and suspicious lesions.   Musculoskeletal: Negative for muscle cramps and myalgias.   Gastrointestinal: Negative for bloating and abdominal pain.   Genitourinary: Negative for frequency and hematuria.   Neurological: Negative for brief paralysis, sensory change and weakness.   Psychiatric/Behavioral: Negative for altered mental status.   Allergic/Immunologic: Negative for hives.     Objective:     Vital Signs (Most Recent):    Vital Signs (24h Range):  Pulse:  [120] 120  Resp:  [20] 20  BP: (130)/(90) 130/90           There is no height or weight on file to calculate BMI.    No intake or output data in the 24 hours ending 02/16/22 1318            Left Hand/Wrist Exam     Tests   Phalens sign: positive  Tinel's sign (median nerve): positive  Carpal Tunnel Compression Test: positive  Cubital Tunnel Compression Test: positive      Other     Sensory Exam  Ulnar Distribution: normal      Left Elbow Exam     Tests   Tinel's sign (cubital tunnel): positive        Vascular Exam       Capillary Refill  Left Hand: normal capillary refill      Significant Labs: All pertinent labs within the past 24 hours have been reviewed.    Significant Imaging: X-Ray: I have reviewed all pertinent results/findings and my personal findings are:  No fracture subluxation    Assessment/Plan:   Impression left carpal tunnel syndrome and ulnar nerve  Entrapment at the elbow  Plan left carpal tunnel release and ulnar nerve transposition at the elbow  No notes have been filed under this hospital service.  Service: Orthopedic Surgery      Wilber Crocker MD  Orthopedics  [facility] - Orthopedic

## 2022-02-16 NOTE — SUBJECTIVE & OBJECTIVE
Past Medical History:   Diagnosis Date    BMI 45.0-49.9, adult 06/20/2018    Cervicalgia 04/07/2021    Diabetes mellitus, type 2     DJD (degenerative joint disease) 03/15/2019    Edema 01/27/2021    Essential hypertension 04/07/2021    GERD (gastroesophageal reflux disease) 01/13/2021    Hyperlipidemia     Hypertension     Low back pain 06/19/2019    Osteoporosis     Pain in right shoulder 04/07/2021    Tachycardia 04/07/2021       Past Surgical History:   Procedure Laterality Date    PATELLA TENDONS REPAIR Bilateral     RADIOFREQUENCY ABLATION Left 2/11/2022    Procedure: Left GSV RF Ablation;  Surgeon: Collins Canada DO;  Location: [facility] OR;  Service: Vascular;  Laterality: Left;    TORN MENISCUS Left     WRIST FRACTURE SURGERY Left        Review of patient's allergies indicates:  No Known Allergies    No current facility-administered medications for this encounter.     Current Outpatient Medications   Medication Sig    acetaminophen-caff-dihydrocod 320.5-30-16 mg Cap     amLODIPine (NORVASC) 10 MG tablet TAKE 1 TABLET DAILY    atorvastatin (LIPITOR) 20 MG tablet TAKE 1 TABLET EVERY EVENING    diclofenac (VOLTAREN) 75 MG EC tablet     ergocalciferol (ERGOCALCIFEROL) 50,000 unit Cap Take 1 capsule by mouth weekly for 12 weeks then take 1 capsule monthly after that    gabapentin (NEURONTIN) 100 MG capsule Take 1 capsule (100 mg total) by mouth 3 (three) times daily.    hydroCHLOROthiazide (MICROZIDE) 12.5 mg capsule     KLOR-CON 8 8 mEq TbSR     metFORMIN (GLUCOPHAGE) 500 MG tablet TAKE 1 TABLET DAILY WITH   BREAKFAST    nabumetone (RELAFEN) 750 MG tablet TAKE 1-2 TABLETS BY MOUTH DAILY WITH FOOD IF NEEDED FOR PAIN.    omega 3-dha-epa-fish oil (FISH OIL) 1,000 mg (120 mg-180 mg) Cap Take 1 capsule by mouth once daily.    omega-3 acid ethyl esters (LOVAZA) 1 gram capsule     omeprazole (PRILOSEC) 40 MG capsule TAKE 1 CAPSULE EVERY       MORNING    potassium chloride SA  (K-DUR,KLOR-CON) 20 MEQ tablet Take 1 tablet (20 mEq total) by mouth once daily.    SUPARTZ FX 10 mg/mL injection     timolol maleate 0.5% (TIMOPTIC) 0.5 % Drop PUT 1 DROP INTO AFFECTED EYE(S) EVERY MORNING    traMADoL (ULTRAM) 50 mg tablet TAKE 1-2 TABLETS BY MOUTH 3 TIMES A DAY FOR PAIN.     Family History     Problem Relation (Age of Onset)    Diabetes Mother, Father    Hypertension Father, Brother    No Known Problems Brother, Brother, Daughter, Daughter, Daughter, Son, Son        Tobacco Use    Smoking status: Never Smoker    Smokeless tobacco: Never Used   Substance and Sexual Activity    Alcohol use: Yes    Drug use: Never    Sexual activity: Yes     Partners: Female     Review of Systems   Constitutional: Negative for decreased appetite.   HENT: Negative for congestion and ear discharge.    Eyes: Negative for blurred vision.   Cardiovascular: Negative for chest pain and syncope.   Respiratory: Negative for cough and wheezing.    Endocrine: Negative for cold intolerance and polyuria.   Hematologic/Lymphatic: Negative for adenopathy and bleeding problem.   Skin: Negative for color change, nail changes and suspicious lesions.   Musculoskeletal: Negative for muscle cramps and myalgias.   Gastrointestinal: Negative for bloating and abdominal pain.   Genitourinary: Negative for frequency and hematuria.   Neurological: Negative for brief paralysis, sensory change and weakness.   Psychiatric/Behavioral: Negative for altered mental status.   Allergic/Immunologic: Negative for hives.     Objective:     Vital Signs (Most Recent):    Vital Signs (24h Range):  Pulse:  [120] 120  Resp:  [20] 20  BP: (130)/(90) 130/90           There is no height or weight on file to calculate BMI.    No intake or output data in the 24 hours ending 02/16/22 1318            Left Hand/Wrist Exam     Tests   Phalens sign: positive  Tinel's sign (median nerve): positive  Carpal Tunnel Compression Test: positive  Cubital Tunnel  Compression Test: positive      Other     Sensory Exam  Ulnar Distribution: normal      Left Elbow Exam     Tests   Tinel's sign (cubital tunnel): positive        Vascular Exam       Capillary Refill  Left Hand: normal capillary refill      Significant Labs: All pertinent labs within the past 24 hours have been reviewed.    Significant Imaging: X-Ray: I have reviewed all pertinent results/findings and my personal findings are:  No fracture subluxation

## 2022-02-17 ENCOUNTER — HOSPITAL ENCOUNTER (OUTPATIENT)
Facility: HOSPITAL | Age: 52
Discharge: HOME | End: 2022-02-17
Attending: ORTHOPAEDIC SURGERY | Admitting: ORTHOPAEDIC SURGERY
Payer: COMMERCIAL

## 2022-02-17 ENCOUNTER — ANESTHESIA EVENT (OUTPATIENT)
Dept: SURGERY | Facility: HOSPITAL | Age: 52
End: 2022-02-17
Payer: COMMERCIAL

## 2022-02-17 ENCOUNTER — ANESTHESIA (OUTPATIENT)
Dept: SURGERY | Facility: HOSPITAL | Age: 52
End: 2022-02-17
Payer: COMMERCIAL

## 2022-02-17 VITALS
RESPIRATION RATE: 16 BRPM | DIASTOLIC BLOOD PRESSURE: 80 MMHG | OXYGEN SATURATION: 90 % | BODY MASS INDEX: 42.95 KG/M2 | SYSTOLIC BLOOD PRESSURE: 130 MMHG | HEIGHT: 69 IN | TEMPERATURE: 98 F | WEIGHT: 290 LBS | HEART RATE: 108 BPM

## 2022-02-17 DIAGNOSIS — G56.22 ULNAR NERVE COMPRESSION, LEFT: ICD-10-CM

## 2022-02-17 DIAGNOSIS — G56.21 ULNAR NERVE COMPRESSION, RIGHT: Primary | ICD-10-CM

## 2022-02-17 DIAGNOSIS — G56.03 CARPAL TUNNEL SYNDROME ON BOTH SIDES: ICD-10-CM

## 2022-02-17 LAB
GLUCOSE SERPL-MCNC: 103 MG/DL (ref 70–105)
GLUCOSE SERPL-MCNC: 86 MG/DL (ref 70–105)

## 2022-02-17 PROCEDURE — 25000003 PHARM REV CODE 250: Performed by: ORTHOPAEDIC SURGERY

## 2022-02-17 PROCEDURE — 71000015 HC POSTOP RECOV 1ST HR: Performed by: ORTHOPAEDIC SURGERY

## 2022-02-17 PROCEDURE — 82962 GLUCOSE BLOOD TEST: CPT

## 2022-02-17 PROCEDURE — 27201423 OPTIME MED/SURG SUP & DEVICES STERILE SUPPLY: Performed by: ORTHOPAEDIC SURGERY

## 2022-02-17 PROCEDURE — 27000655: Performed by: ANESTHESIOLOGY

## 2022-02-17 PROCEDURE — 63600175 PHARM REV CODE 636 W HCPCS: Performed by: NURSE ANESTHETIST, CERTIFIED REGISTERED

## 2022-02-17 PROCEDURE — 27000510 HC BLANKET BAIR HUGGER ANY SIZE: Performed by: ANESTHESIOLOGY

## 2022-02-17 PROCEDURE — 37000009 HC ANESTHESIA EA ADD 15 MINS: Performed by: ORTHOPAEDIC SURGERY

## 2022-02-17 PROCEDURE — 36000706: Performed by: ORTHOPAEDIC SURGERY

## 2022-02-17 PROCEDURE — 27000177 HC AIRWAY, LARYNGEAL MASK: Performed by: ANESTHESIOLOGY

## 2022-02-17 PROCEDURE — 27000716 HC OXISENSOR PROBE, ANY SIZE: Performed by: ANESTHESIOLOGY

## 2022-02-17 PROCEDURE — 27000260 *HC AIRWAY ORAL: Performed by: ANESTHESIOLOGY

## 2022-02-17 PROCEDURE — 25000003 PHARM REV CODE 250: Performed by: NURSE ANESTHETIST, CERTIFIED REGISTERED

## 2022-02-17 PROCEDURE — D9220A PRA ANESTHESIA: ICD-10-PCS | Mod: CRNA,,, | Performed by: NURSE ANESTHETIST, CERTIFIED REGISTERED

## 2022-02-17 PROCEDURE — D9220A PRA ANESTHESIA: Mod: ANES,,, | Performed by: ANESTHESIOLOGY

## 2022-02-17 PROCEDURE — 71000016 HC POSTOP RECOV ADDL HR: Performed by: ORTHOPAEDIC SURGERY

## 2022-02-17 PROCEDURE — 37000008 HC ANESTHESIA 1ST 15 MINUTES: Performed by: ORTHOPAEDIC SURGERY

## 2022-02-17 PROCEDURE — 27100168 OPTIME MED/SURG SUP & DEVICES NON-STERILE SUPPLY: Performed by: ORTHOPAEDIC SURGERY

## 2022-02-17 PROCEDURE — D9220A PRA ANESTHESIA: Mod: CRNA,,, | Performed by: NURSE ANESTHETIST, CERTIFIED REGISTERED

## 2022-02-17 PROCEDURE — 36000707: Performed by: ORTHOPAEDIC SURGERY

## 2022-02-17 PROCEDURE — D9220A PRA ANESTHESIA: ICD-10-PCS | Mod: ANES,,, | Performed by: ANESTHESIOLOGY

## 2022-02-17 PROCEDURE — 71000033 HC RECOVERY, INTIAL HOUR: Performed by: ORTHOPAEDIC SURGERY

## 2022-02-17 RX ORDER — SODIUM CHLORIDE 9 MG/ML
INJECTION, SOLUTION INTRAVENOUS CONTINUOUS
Status: DISCONTINUED | OUTPATIENT
Start: 2022-02-17 | End: 2022-02-17 | Stop reason: HOSPADM

## 2022-02-17 RX ORDER — ONDANSETRON 2 MG/ML
INJECTION INTRAMUSCULAR; INTRAVENOUS
Status: DISCONTINUED | OUTPATIENT
Start: 2022-02-17 | End: 2022-02-17

## 2022-02-17 RX ORDER — LIDOCAINE HYDROCHLORIDE 10 MG/ML
1 INJECTION, SOLUTION EPIDURAL; INFILTRATION; INTRACAUDAL; PERINEURAL ONCE
Status: CANCELLED | OUTPATIENT
Start: 2022-02-17 | End: 2022-02-17

## 2022-02-17 RX ORDER — ONDANSETRON 2 MG/ML
4 INJECTION INTRAMUSCULAR; INTRAVENOUS DAILY PRN
Status: DISCONTINUED | OUTPATIENT
Start: 2022-02-17 | End: 2022-02-17 | Stop reason: HOSPADM

## 2022-02-17 RX ORDER — MORPHINE SULFATE 8 MG/ML
INJECTION INTRAMUSCULAR; INTRAVENOUS; SUBCUTANEOUS
Status: DISCONTINUED | OUTPATIENT
Start: 2022-02-17 | End: 2022-02-17

## 2022-02-17 RX ORDER — ACETAMINOPHEN 500 MG
1000 TABLET ORAL EVERY 6 HOURS PRN
Status: DISCONTINUED | OUTPATIENT
Start: 2022-02-17 | End: 2022-02-17 | Stop reason: HOSPADM

## 2022-02-17 RX ORDER — OXYCODONE HYDROCHLORIDE 5 MG/1
5 TABLET ORAL
Status: DISCONTINUED | OUTPATIENT
Start: 2022-02-17 | End: 2022-02-17 | Stop reason: HOSPADM

## 2022-02-17 RX ORDER — OXYCODONE AND ACETAMINOPHEN 10; 325 MG/1; MG/1
1 TABLET ORAL EVERY 6 HOURS PRN
Qty: 28 TABLET | Refills: 0 | Status: SHIPPED | OUTPATIENT
Start: 2022-02-17 | End: 2022-08-02

## 2022-02-17 RX ORDER — CEFAZOLIN SODIUM 1 G/3ML
INJECTION, POWDER, FOR SOLUTION INTRAMUSCULAR; INTRAVENOUS
Status: DISCONTINUED | OUTPATIENT
Start: 2022-02-17 | End: 2022-02-17

## 2022-02-17 RX ORDER — OXYCODONE HYDROCHLORIDE 5 MG/1
10 TABLET ORAL EVERY 4 HOURS PRN
Status: DISCONTINUED | OUTPATIENT
Start: 2022-02-17 | End: 2022-02-17 | Stop reason: HOSPADM

## 2022-02-17 RX ORDER — LIDOCAINE HYDROCHLORIDE 20 MG/ML
INJECTION, SOLUTION EPIDURAL; INFILTRATION; INTRACAUDAL; PERINEURAL
Status: DISCONTINUED | OUTPATIENT
Start: 2022-02-17 | End: 2022-02-17

## 2022-02-17 RX ORDER — MEPERIDINE HYDROCHLORIDE 25 MG/ML
25 INJECTION INTRAMUSCULAR; INTRAVENOUS; SUBCUTANEOUS EVERY 10 MIN PRN
Status: DISCONTINUED | OUTPATIENT
Start: 2022-02-17 | End: 2022-02-17 | Stop reason: HOSPADM

## 2022-02-17 RX ORDER — PROPOFOL 10 MG/ML
VIAL (ML) INTRAVENOUS
Status: DISCONTINUED | OUTPATIENT
Start: 2022-02-17 | End: 2022-02-17

## 2022-02-17 RX ORDER — CEFAZOLIN SODIUM 2 G/50ML
2 SOLUTION INTRAVENOUS
Status: DISCONTINUED | OUTPATIENT
Start: 2022-02-17 | End: 2022-02-17 | Stop reason: HOSPADM

## 2022-02-17 RX ORDER — FENTANYL CITRATE 50 UG/ML
INJECTION, SOLUTION INTRAMUSCULAR; INTRAVENOUS
Status: DISCONTINUED | OUTPATIENT
Start: 2022-02-17 | End: 2022-02-17

## 2022-02-17 RX ORDER — PHENYLEPHRINE HYDROCHLORIDE 10 MG/ML
INJECTION INTRAVENOUS
Status: DISCONTINUED | OUTPATIENT
Start: 2022-02-17 | End: 2022-02-17

## 2022-02-17 RX ORDER — HYDROCODONE BITARTRATE AND ACETAMINOPHEN 5; 325 MG/1; MG/1
1 TABLET ORAL EVERY 4 HOURS PRN
Status: DISCONTINUED | OUTPATIENT
Start: 2022-02-17 | End: 2022-02-17 | Stop reason: HOSPADM

## 2022-02-17 RX ORDER — PROMETHAZINE HYDROCHLORIDE 25 MG/1
25 TABLET ORAL EVERY 6 HOURS PRN
Status: DISCONTINUED | OUTPATIENT
Start: 2022-02-17 | End: 2022-02-17 | Stop reason: HOSPADM

## 2022-02-17 RX ORDER — DIPHENHYDRAMINE HYDROCHLORIDE 50 MG/ML
25 INJECTION INTRAMUSCULAR; INTRAVENOUS EVERY 6 HOURS PRN
Status: DISCONTINUED | OUTPATIENT
Start: 2022-02-17 | End: 2022-02-17 | Stop reason: HOSPADM

## 2022-02-17 RX ORDER — ONDANSETRON 4 MG/1
8 TABLET, ORALLY DISINTEGRATING ORAL EVERY 8 HOURS PRN
Status: DISCONTINUED | OUTPATIENT
Start: 2022-02-17 | End: 2022-02-17 | Stop reason: HOSPADM

## 2022-02-17 RX ORDER — SODIUM CHLORIDE, SODIUM LACTATE, POTASSIUM CHLORIDE, CALCIUM CHLORIDE 600; 310; 30; 20 MG/100ML; MG/100ML; MG/100ML; MG/100ML
INJECTION, SOLUTION INTRAVENOUS CONTINUOUS
Status: CANCELLED | OUTPATIENT
Start: 2022-02-17

## 2022-02-17 RX ORDER — MIDAZOLAM HYDROCHLORIDE 1 MG/ML
INJECTION INTRAMUSCULAR; INTRAVENOUS
Status: DISCONTINUED | OUTPATIENT
Start: 2022-02-17 | End: 2022-02-17

## 2022-02-17 RX ORDER — HYDROMORPHONE HYDROCHLORIDE 2 MG/ML
0.5 INJECTION, SOLUTION INTRAMUSCULAR; INTRAVENOUS; SUBCUTANEOUS EVERY 5 MIN PRN
Status: DISCONTINUED | OUTPATIENT
Start: 2022-02-17 | End: 2022-02-17 | Stop reason: HOSPADM

## 2022-02-17 RX ORDER — MORPHINE SULFATE 10 MG/ML
4 INJECTION INTRAMUSCULAR; INTRAVENOUS; SUBCUTANEOUS EVERY 5 MIN PRN
Status: DISCONTINUED | OUTPATIENT
Start: 2022-02-17 | End: 2022-02-17 | Stop reason: HOSPADM

## 2022-02-17 RX ORDER — SODIUM CHLORIDE, SODIUM LACTATE, POTASSIUM CHLORIDE, CALCIUM CHLORIDE 600; 310; 30; 20 MG/100ML; MG/100ML; MG/100ML; MG/100ML
125 INJECTION, SOLUTION INTRAVENOUS CONTINUOUS
Status: DISCONTINUED | OUTPATIENT
Start: 2022-02-17 | End: 2022-02-17 | Stop reason: HOSPADM

## 2022-02-17 RX ADMIN — MIDAZOLAM HYDROCHLORIDE 2 MG: 1 INJECTION, SOLUTION INTRAMUSCULAR; INTRAVENOUS at 08:02

## 2022-02-17 RX ADMIN — LIDOCAINE HYDROCHLORIDE 100 MG: 20 INJECTION, SOLUTION EPIDURAL; INFILTRATION; INTRACAUDAL; PERINEURAL at 08:02

## 2022-02-17 RX ADMIN — SODIUM CHLORIDE: 9 INJECTION, SOLUTION INTRAVENOUS at 09:02

## 2022-02-17 RX ADMIN — PROPOFOL 150 MG: 10 INJECTION, EMULSION INTRAVENOUS at 08:02

## 2022-02-17 RX ADMIN — FENTANYL CITRATE 50 MCG: 50 INJECTION INTRAMUSCULAR; INTRAVENOUS at 09:02

## 2022-02-17 RX ADMIN — FENTANYL CITRATE 25 MCG: 50 INJECTION INTRAMUSCULAR; INTRAVENOUS at 08:02

## 2022-02-17 RX ADMIN — CEFAZOLIN 2 G: 1 INJECTION, POWDER, FOR SOLUTION INTRAMUSCULAR; INTRAVENOUS; PARENTERAL at 08:02

## 2022-02-17 RX ADMIN — ONDANSETRON 8 MG: 2 INJECTION INTRAMUSCULAR; INTRAVENOUS at 08:02

## 2022-02-17 RX ADMIN — PHENYLEPHRINE HYDROCHLORIDE 100 MCG: 10 INJECTION INTRAVENOUS at 08:02

## 2022-02-17 RX ADMIN — SODIUM CHLORIDE: 9 INJECTION, SOLUTION INTRAVENOUS at 07:02

## 2022-02-17 RX ADMIN — MORPHINE SULFATE 4 MG: 8 INJECTION INTRAVENOUS at 08:02

## 2022-02-17 NOTE — INTERVAL H&P NOTE
The patient has been examined and the H&P has been reviewed:    I concur with the findings and no changes have occurred since H&P was written.    Surgery risks, benefits and alternative options discussed and understood by patient/family.          Active Hospital Problems    Diagnosis  POA    *Carpal tunnel syndrome on both sides [G56.03]  Yes      Resolved Hospital Problems   No resolved problems to display.

## 2022-02-17 NOTE — ANESTHESIA PROCEDURE NOTES
Intubation    Date/Time: 2/17/2022 8:13 AM  Performed by: Evangelina Vazquez CRNA  Authorized by: Juan Paris MD     Intubation:     Induction:  Intravenous    Intubated:  Postinduction    Mask Ventilation:  Easy mask    Attempts:  1    Attempted By:  CRNA    Difficult Airway Encountered?: No      Complications:  None    Airway Device:  Supraglottic airway/LMA    Airway Device Size:  4.0    Style/Cuff Inflation:  Cuffed (inflated to minimal occlusive pressure)    Placement Verified By:  Capnometry    Findings Post-Intubation:  BS equal bilateral and atraumatic/condition of teeth unchanged

## 2022-02-17 NOTE — ANESTHESIA PREPROCEDURE EVALUATION
02/17/2022  Papo Bell is a 51 y.o., male.    Anesthesia Evaluation    I have reviewed the Patient Summary Reports.    I have reviewed the Nursing Notes. I have reviewed the NPO Status.   I have reviewed the Medications.     Review of Systems  Anesthesia Hx:  No problems with previous Anesthesia    Social:  Non-Smoker, No Alcohol Use    Hematology/Oncology:  Hematology Normal   Oncology Normal     EENT/Dental:EENT/Dental Normal   Cardiovascular:   Hypertension hyperlipidemia    Pulmonary:  Pulmonary Normal    Renal/:  Renal/ Normal     Hepatic/GI:   GERD    Musculoskeletal:   Arthritis     Neurological:  Neurology Normal    Endocrine:   Diabetes, well controlled, type 2    Dermatological:  Skin Normal    Psych:  Psychiatric Normal           Physical Exam  General:  Well nourished    Airway/Jaw/Neck:  Airway Findings: Mouth Opening: Normal Mallampati: II     Eyes/Ears/Nose:  Eyes/Ears/Nose Findings:     Chest/Lungs:  Chest/Lungs Findings: Clear to auscultation     Heart/Vascular:  Heart Findings: Rate: Normal  Rhythm: Regular Rhythm        Mental Status:  Mental Status Findings:  Cooperative, Alert and Oriented         Anesthesia Plan  Type of Anesthesia, risks & benefits discussed:  Anesthesia Type:  general    Patient's Preference:   Plan Factors:          Intra-op Monitoring Plan: standard ASA monitors  Intra-op Monitoring Plan Comments:   Post Op Pain Control Plan: per primary service following discharge from PACU and multimodal analgesia  Post Op Pain Control Plan Comments:     Induction:   IV  Beta Blocker:  Patient is not currently on a Beta-Blocker (No further documentation required).       Informed Consent: Patient understands risks and agrees with Anesthesia plan.  Questions answered. Anesthesia consent signed with patient.  ASA Score: 2     Day of Surgery Review of History & Physical: I have  interviewed and examined the patient. I have reviewed the patient's H&P dated:  There are no significant changes.          Ready For Surgery From Anesthesia Perspective.

## 2022-02-17 NOTE — DISCHARGE INSTRUCTIONS
AVOID SITTING OR LYING FOR LONG PERIODS OF TIME  WEAR ELAINE HOSE TIL RETURN VISIT. REMOVE X 1 HOUR IN MORNING, REAPPLY  REMOVE X 1 HOUR IN AFTERNOON AND REAPPLY  PUMP ANKLES OFTEN  KEEP DRESSING ON AND DRY  WEAR SLING AND KEEP ARM UP

## 2022-02-17 NOTE — ANESTHESIA POSTPROCEDURE EVALUATION
Anesthesia Post Evaluation    Patient: Papo Bell    Procedure(s) Performed: Procedure(s) (LRB):  RELEASE, CARPAL TUNNEL (Left)  TRANSPOSITION, NERVE, ULNAR (Left)    Final Anesthesia Type: general      Patient location during evaluation: PACU  Patient participation: Yes- Able to Participate  Level of consciousness: awake and sedated  Post-procedure vital signs: reviewed and stable  Pain management: adequate  Airway patency: patent    PONV status at discharge: No PONV  Anesthetic complications: no      Cardiovascular status: blood pressure returned to baseline  Respiratory status: unassisted  Hydration status: euvolemic  Follow-up not needed.          Vitals Value Taken Time   /80 02/17/22 1200   Temp 36.7 °C (98.1 °F) 02/17/22 1020   Pulse 143 02/17/22 1209   Resp 16 02/17/22 1105   SpO2 94 % 02/17/22 1209   Vitals shown include unvalidated device data.      Event Time   Out of Recovery 10:57:42         Pain/Lion Score: No data recorded

## 2022-02-17 NOTE — PLAN OF CARE
1105 Released to Suzanne Varner RN in outpatient surgery.He is awake with a pain level of 4. His wife is in the room.

## 2022-02-17 NOTE — BRIEF OP NOTE
[facility] - Orthopedic Periop Services  Brief Operative Note    Surgery Date: 2/17/2022     Surgeon(s) and Role:     * Wilber Crocker MD - Primary    Assisting Surgeon: None    Pre-op Diagnosis:  Carpal tunnel syndrome on both sides [G56.03]  Ulnar nerve compression, left [G56.22]    Post-op Diagnosis:  Post-Op Diagnosis Codes:     * Carpal tunnel syndrome on both sides [G56.03]     * Ulnar nerve compression, left [G56.22]    Procedure(s) (LRB):  RELEASE, CARPAL TUNNEL (Left)  TRANSPOSITION, NERVE, ULNAR (Left)    Anesthesia: Choice    Operative Findings:  Patient admitted to the hospital on 02/17/2022 underwent a left ulnar nerve transposition and carpal tunnel release.  DC home.  Follow-up 2 weeks.  Keep splint intact.    Estimated Blood Loss: * No values recorded between 2/17/2022  8:42 AM and 2/17/2022  9:59 AM *         Specimens:   Specimen (24h ago, onward)            None            Discharge Note    OUTCOME: Patient tolerated treatment/procedure well without complication and is now ready for discharge.    DISPOSITION: Home or Self Care    FINAL DIAGNOSIS:  Carpal tunnel syndrome on both sides    FOLLOWUP: In clinic    DISCHARGE INSTRUCTIONS:    Discharge Procedure Orders   Diet general     Keep surgical extremity elevated     Ice to affected area   Order Comments: using barrier between ice and skin (specify duration&frequency)     Call MD for:  temperature >100.4     Call MD for:  persistent nausea and vomiting     Call MD for:  severe uncontrolled pain     Call MD for:  difficulty breathing, headache or visual disturbances     Call MD for:  redness, tenderness, or signs of infection (pain, swelling, redness, odor or green/yellow discharge around incision site)     Call MD for:  hives     Call MD for:  persistent dizziness or light-headedness     Call MD for:  extreme fatigue     Leave dressing on - Keep it clean, dry, and intact until clinic visit     Activity as tolerated     Shower on day dressing  removed (No bath)     Weight bearing as tolerated

## 2022-02-17 NOTE — TRANSFER OF CARE
"Anesthesia Transfer of Care Note    Patient: Papo Bell    Procedure(s) Performed: Procedure(s) (LRB):  RELEASE, CARPAL TUNNEL (Left)  TRANSPOSITION, NERVE, ULNAR (Left)    Patient location: PACU    Anesthesia Type: general    Transport from OR: Transported from OR on 6-10 L/min O2 by face mask with adequate spontaneous ventilation    Post pain: adequate analgesia    Post assessment: no apparent anesthetic complications and tolerated procedure well    Post vital signs: stable    Level of consciousness: responds to stimulation and awake    Nausea/Vomiting: no nausea/vomiting    Complications: none    Transfer of care protocol was followed      Last vitals:   Visit Vitals  /86 (BP Location: Right arm, Patient Position: Lying)   Pulse 101   Temp 36.7 °C (98.1 °F)   Resp 14   Ht 5' 9" (1.753 m)   Wt 131.5 kg (290 lb)   SpO2 95%   BMI 42.83 kg/m²     "

## 2022-02-17 NOTE — OP NOTE
[facility] - Orthopedic Periop Services  General Surgery  Operative Note    SUMMARY     Date of Procedure: 2/17/2022     Procedure: Procedure(s) (LRB):  RELEASE, CARPAL TUNNEL (Left)  TRANSPOSITION, NERVE, ULNAR (Left)       Surgeon(s) and Role:     * Wilber Crocker MD - Primary    Assisting Surgeon: None    Pre-Operative Diagnosis: Carpal tunnel syndrome on both sides [G56.03]  Ulnar nerve compression, left [G56.22]    Post-Operative Diagnosis: Post-Op Diagnosis Codes:     * Carpal tunnel syndrome on both sides [G56.03]     * Ulnar nerve compression, left [G56.22]    Anesthesia: Choice    Operative Findings (including complications, if any):  After risks benefits procedure explained at length the patient stating understands risks benefits written informed consent was obtained patient was taken operating room placed in supine position on operative table which time anesthesia was induced per Anesthesia.  Tourniquet placed over cast padding on proximal left arm left upper extremities then prepped draped sterile fashion.  Arm was elevated exsanguinated Esmarch bandage tourniquet inflated 250 mm Hg.  At this time a 3 4 cm incision was made in the palm just to the ulnar aspect of the crease overlying the carpal tunnel was made with a 15. Blade through the skin careful dissection of the carpal tunnel performed using pickups and scissors.  This time carpal tunnel was identified going to the ulnar aspect of the transverse carpal ligament the ligament was released there was some tenosynovitis noted around the tendons the median nerve was protected at this time with the carpal tunnel full released wound was irrigated out copious amounts normal saline closure of the skin with a running 4-0 nylon suture.  Once this been done the incision proximally for 10-15 cm in length was made over the elbow going between the medial epicondyle the olecranon.  It was made over the course of the ulnar nerve.  Is made the skin a 15. Blade.   Careful dissection down to the ulnar nerve and the fascia overlying the medial aspect the forearm was performed using pickups and scissors.  Neurovascular structures protected.  At this time hemostasis maintained Bovie electrocautery.  The ulnar nerve was then released from the underlying fascia and released from proximal to distal releasing it from its area of constriction at the cubital tunnel.  Once it has been transposed anteriorly the cubital tunnel was then loosely closed with a 2-0 Vicryl.  Wound was irrigated out copious amounts normal saline the arm was able to go into full flexion extension without any tension on the nerve.  At this time tourniquet let down hemostasis maintained Bovie electrocautery.  Subcuticular 2-0 Vicryl followed by running 3-0 nylon skin.  Sterile occlusive dressing placed.  Posterior splint placed.  Patient was awakened taken recovery in good condition.  All counts correct.  No complications.    Description of Technical Procedures:     Significant Surgical Tasks Conducted by the Assistant(s), if Applicable:     Estimated Blood Loss (EBL): * No values recorded between 2/17/2022  8:42 AM and 2/17/2022  9:56 AM *           Implants: * No implants in log *    Specimens:   Specimen (24h ago, onward)            None                  Condition: Good    Disposition: PACU - hemodynamically stable.    Attestation: I was present and scrubbed for the entire procedure.

## 2022-02-18 NOTE — ANESTHESIA POSTPROCEDURE EVALUATION
Anesthesia Post Evaluation    Patient: Papo Bell    Procedure(s) Performed: Procedure(s) (LRB):  Left GSV RF Ablation (Left)    Final Anesthesia Type: general      Patient location during evaluation: PACU  Patient participation: Yes- Able to Participate  Level of consciousness: awake and alert  Post-procedure vital signs: reviewed and stable  Pain management: adequate  Airway patency: patent  BEVERLY mitigation strategies: Multimodal analgesia  PONV status at discharge: No PONV  Anesthetic complications: no      Cardiovascular status: blood pressure returned to baseline  Respiratory status: unassisted  Hydration status: euvolemic  Follow-up not needed.          Vitals Value Taken Time   /73 02/11/22 0845   Temp 36.9 °C (98.5 °F) 02/11/22 0900   Pulse 100 02/11/22 0900   Resp 16 02/11/22 0840   SpO2 100 % 02/11/22 0900         Event Time   Out of Recovery 08:40:26         Pain/Lion Score: No data recorded

## 2022-03-21 ENCOUNTER — OFFICE VISIT (OUTPATIENT)
Dept: VASCULAR SURGERY | Facility: CLINIC | Age: 52
End: 2022-03-21
Payer: COMMERCIAL

## 2022-03-21 VITALS
SYSTOLIC BLOOD PRESSURE: 136 MMHG | BODY MASS INDEX: 42.68 KG/M2 | DIASTOLIC BLOOD PRESSURE: 80 MMHG | HEIGHT: 69 IN | WEIGHT: 288.19 LBS | HEART RATE: 120 BPM | RESPIRATION RATE: 16 BRPM

## 2022-03-21 DIAGNOSIS — R60.0 EDEMA, LOWER EXTREMITY: ICD-10-CM

## 2022-03-21 DIAGNOSIS — I87.2 VENOUS INSUFFICIENCY: ICD-10-CM

## 2022-03-21 DIAGNOSIS — L81.9 HYPERPIGMENTATION OF SKIN: Primary | ICD-10-CM

## 2022-03-21 DIAGNOSIS — R20.0 NUMBNESS OF LOWER EXTREMITY: ICD-10-CM

## 2022-03-21 PROCEDURE — 3075F PR MOST RECENT SYSTOLIC BLOOD PRESS GE 130-139MM HG: ICD-10-PCS | Mod: ,,, | Performed by: NURSE PRACTITIONER

## 2022-03-21 PROCEDURE — 3008F BODY MASS INDEX DOCD: CPT | Mod: ,,, | Performed by: NURSE PRACTITIONER

## 2022-03-21 PROCEDURE — 99214 OFFICE O/P EST MOD 30 MIN: CPT | Mod: PBBFAC | Performed by: NURSE PRACTITIONER

## 2022-03-21 PROCEDURE — 3079F PR MOST RECENT DIASTOLIC BLOOD PRESSURE 80-89 MM HG: ICD-10-PCS | Mod: ,,, | Performed by: NURSE PRACTITIONER

## 2022-03-21 PROCEDURE — 1159F PR MEDICATION LIST DOCUMENTED IN MEDICAL RECORD: ICD-10-PCS | Mod: ,,, | Performed by: NURSE PRACTITIONER

## 2022-03-21 PROCEDURE — 3079F DIAST BP 80-89 MM HG: CPT | Mod: ,,, | Performed by: NURSE PRACTITIONER

## 2022-03-21 PROCEDURE — 3075F SYST BP GE 130 - 139MM HG: CPT | Mod: ,,, | Performed by: NURSE PRACTITIONER

## 2022-03-21 PROCEDURE — 3008F PR BODY MASS INDEX (BMI) DOCUMENTED: ICD-10-PCS | Mod: ,,, | Performed by: NURSE PRACTITIONER

## 2022-03-21 PROCEDURE — 99214 PR OFFICE/OUTPT VISIT, EST, LEVL IV, 30-39 MIN: ICD-10-PCS | Mod: S$PBB,,, | Performed by: NURSE PRACTITIONER

## 2022-03-21 PROCEDURE — 99214 OFFICE O/P EST MOD 30 MIN: CPT | Mod: S$PBB,,, | Performed by: NURSE PRACTITIONER

## 2022-03-21 PROCEDURE — 1159F MED LIST DOCD IN RCRD: CPT | Mod: ,,, | Performed by: NURSE PRACTITIONER

## 2022-03-21 NOTE — PROGRESS NOTES
[facility] CLINIC NOTE    Patient ID: Papo Bell is a 51 y.o. male.    I. HISTORY     Chief Complaint:   Chief Complaint   Patient presents with    Follow-up     1 month PA, left GSV RF ablation       HPI: Papo Bell is a 51 y.o. male who presents today for a one month f/u s/p left GSV RF ablation. Patient states that his leg symptoms are significantly improved but not completely resolved. He continues to wear compression daily, elevates his legs periodically and performs calf pumping exercises. No open ulcers. No history of DVT. He says leg swelling has improved and his leg doesn;t feel as heavy as it once did.  His primary complaint today is numbness in both feet that has been ongoing for several months.     Vein procedure:   Lt. GSV RF ablation 2-      Past Medical History:   Diagnosis Date    BMI 45.0-49.9, adult 06/20/2018    Cervicalgia 04/07/2021    Diabetes mellitus, type 2     DJD (degenerative joint disease) 03/15/2019    Edema 01/27/2021    Essential hypertension 04/07/2021    GERD (gastroesophageal reflux disease) 01/13/2021    Hyperlipidemia     Hypertension     Low back pain 06/19/2019    Osteoporosis     Pain in right shoulder 04/07/2021    Tachycardia 04/07/2021        Past Surgical History:   Procedure Laterality Date    CARPAL TUNNEL RELEASE Left 2/17/2022    Procedure: RELEASE, CARPAL TUNNEL;  Surgeon: Wilber Crocker MD;  Location: [facility] OR;  Service: Orthopedics;  Laterality: Left;    PATELLA TENDONS REPAIR Bilateral     RADIOFREQUENCY ABLATION Left 2/11/2022    Procedure: Left GSV RF Ablation;  Surgeon: Collins Canada DO;  Location: [facility] OR;  Service: Vascular;  Laterality: Left;    TORN MENISCUS Left     ULNAR NERVE TRANSPOSITION Left 2/17/2022    Procedure: TRANSPOSITION, NERVE, ULNAR;  Surgeon: Wilber Crocker MD;  Location: [facility] OR;  Service: Orthopedics;  Laterality: Left;    WRIST FRACTURE SURGERY Left        Social History      Tobacco Use   Smoking Status Never Smoker   Smokeless Tobacco Never Used         Current Outpatient Medications:     acetaminophen-caff-dihydrocod 320.5-30-16 mg Cap, , Disp: , Rfl:     amLODIPine (NORVASC) 10 MG tablet, TAKE 1 TABLET DAILY, Disp: 90 tablet, Rfl: 1    atorvastatin (LIPITOR) 20 MG tablet, TAKE 1 TABLET EVERY EVENING, Disp: 90 tablet, Rfl: 1    diclofenac (VOLTAREN) 75 MG EC tablet, , Disp: , Rfl:     ergocalciferol (ERGOCALCIFEROL) 50,000 unit Cap, Take 1 capsule by mouth weekly for 12 weeks then take 1 capsule monthly after that, Disp: 12 capsule, Rfl: 2    gabapentin (NEURONTIN) 100 MG capsule, Take 1 capsule (100 mg total) by mouth 3 (three) times daily., Disp: 90 capsule, Rfl: 11    hydroCHLOROthiazide (MICROZIDE) 12.5 mg capsule, , Disp: , Rfl:     metFORMIN (GLUCOPHAGE) 500 MG tablet, TAKE 1 TABLET DAILY WITH   BREAKFAST, Disp: 90 tablet, Rfl: 1    nabumetone (RELAFEN) 750 MG tablet, TAKE 1-2 TABLETS BY MOUTH DAILY WITH FOOD IF NEEDED FOR PAIN., Disp: , Rfl:     omega 3-dha-epa-fish oil (FISH OIL) 1,000 mg (120 mg-180 mg) Cap, Take 1 capsule by mouth once daily., Disp: 90 capsule, Rfl: 3    omeprazole (PRILOSEC) 40 MG capsule, TAKE 1 CAPSULE EVERY       MORNING, Disp: 90 capsule, Rfl: 1    oxyCODONE-acetaminophen (PERCOCET)  mg per tablet, Take 1 tablet by mouth every 6 (six) hours as needed for Pain., Disp: 28 tablet, Rfl: 0    potassium chloride SA (K-DUR,KLOR-CON) 20 MEQ tablet, Take 1 tablet (20 mEq total) by mouth once daily., Disp: 30 tablet, Rfl: 5    timolol maleate 0.5% (TIMOPTIC) 0.5 % Drop, PUT 1 DROP INTO AFFECTED EYE(S) EVERY MORNING, Disp: , Rfl:     traMADoL (ULTRAM) 50 mg tablet, TAKE 1-2 TABLETS BY MOUTH 3 TIMES A DAY FOR PAIN., Disp: , Rfl:     Review of Systems   Constitutional: Negative for fatigue and fever.   Eyes: Negative for pain.   Respiratory: Negative for chest tightness and shortness of breath.    Cardiovascular: Positive for leg swelling.  Negative for chest pain.   Gastrointestinal: Negative for abdominal pain.   Musculoskeletal: Negative for leg pain.        Chronic darker skin changes bilaterally   Neurological: Positive for numbness. Negative for syncope.   Psychiatric/Behavioral: Negative for sleep disturbance.          II. PHYSICAL EXAM     Physical Exam  Vitals reviewed.   Constitutional:       General: He is not in acute distress.     Appearance: He is obese.   HENT:      Head: Normocephalic.   Eyes:      Pupils: Pupils are equal, round, and reactive to light.   Cardiovascular:      Rate and Rhythm: Regular rhythm. Tachycardia present.   Pulmonary:      Effort: Pulmonary effort is normal.   Abdominal:      Palpations: Abdomen is soft.   Musculoskeletal:         General: No swelling. Normal range of motion.      Cervical back: Neck supple.      Left lower leg: Edema present.      Comments: Mild left leg swelling   Skin:     General: Skin is warm and dry.   Neurological:      Mental Status: He is alert and oriented to person, place, and time.               III. ASSESSMENT & PLAN (MEDICAL DECISION MAKING)     1. Hyperpigmentation of skin    2. Venous insufficiency    3. Edema, lower extremity    4. Numbness of lower extremity          Assessment/Diagnosis and Plan:  The patient is doing well post ablation.    - Recommend continued compression with 20-30mmHg Rx stockings  - Therapeutic leg elevation  - Calf pumping exercises  - RTC 2 months for further evaluation, no PA US needed unless he's having problems. Patient is low risk.       KIRK Shi

## 2022-05-21 ENCOUNTER — HOSPITAL ENCOUNTER (EMERGENCY)
Facility: HOSPITAL | Age: 52
Discharge: HOME | End: 2022-05-21
Attending: EMERGENCY MEDICINE
Payer: COMMERCIAL

## 2022-05-21 VITALS
HEIGHT: 68 IN | DIASTOLIC BLOOD PRESSURE: 85 MMHG | WEIGHT: 291.63 LBS | SYSTOLIC BLOOD PRESSURE: 129 MMHG | RESPIRATION RATE: 18 BRPM | TEMPERATURE: 98 F | OXYGEN SATURATION: 99 % | BODY MASS INDEX: 44.2 KG/M2 | HEART RATE: 104 BPM

## 2022-05-21 DIAGNOSIS — M54.17 LUMBOSACRAL RADICULOPATHY: Primary | ICD-10-CM

## 2022-05-21 PROCEDURE — 99285 EMERGENCY DEPT VISIT HI MDM: CPT | Mod: 25

## 2022-05-21 PROCEDURE — 96372 THER/PROPH/DIAG INJ SC/IM: CPT

## 2022-05-21 PROCEDURE — 99284 EMERGENCY DEPT VISIT MOD MDM: CPT | Mod: ,,, | Performed by: EMERGENCY MEDICINE

## 2022-05-21 PROCEDURE — 99284 PR EMERGENCY DEPT VISIT,LEVEL IV: ICD-10-PCS | Mod: ,,, | Performed by: EMERGENCY MEDICINE

## 2022-05-21 PROCEDURE — 63600175 PHARM REV CODE 636 W HCPCS: Performed by: EMERGENCY MEDICINE

## 2022-05-21 RX ORDER — ORPHENADRINE CITRATE 30 MG/ML
60 INJECTION INTRAMUSCULAR; INTRAVENOUS
Status: COMPLETED | OUTPATIENT
Start: 2022-05-21 | End: 2022-05-21

## 2022-05-21 RX ORDER — NAPROXEN 500 MG/1
500 TABLET ORAL 2 TIMES DAILY PRN
Qty: 14 TABLET | Refills: 0 | Status: SHIPPED | OUTPATIENT
Start: 2022-05-21 | End: 2022-05-28

## 2022-05-21 RX ORDER — KETOROLAC TROMETHAMINE 30 MG/ML
30 INJECTION, SOLUTION INTRAMUSCULAR; INTRAVENOUS
Status: COMPLETED | OUTPATIENT
Start: 2022-05-21 | End: 2022-05-21

## 2022-05-21 RX ORDER — ORPHENADRINE CITRATE 100 MG/1
100 TABLET, EXTENDED RELEASE ORAL 2 TIMES DAILY
Qty: 20 TABLET | Refills: 0 | Status: SHIPPED | OUTPATIENT
Start: 2022-05-21 | End: 2022-05-31

## 2022-05-21 RX ADMIN — KETOROLAC TROMETHAMINE 30 MG: 30 INJECTION, SOLUTION INTRAMUSCULAR at 08:05

## 2022-05-21 RX ADMIN — ORPHENADRINE CITRATE 60 MG: 30 INJECTION INTRAMUSCULAR; INTRAVENOUS at 08:05

## 2022-05-21 NOTE — ED PROVIDER NOTES
Encounter Date: 5/21/2022       History     Chief Complaint   Patient presents with    Back Pain     X 2 days     Patient presents with low back pain for 2 days with some tingling and numbness down both legs more so on the right than the left.  No weakness, no loss of bowel or bladder control.  Has a history per the chart of DJD and low back pain in the past.  Patient at this time states he does not have a history of back pain denies injury.        Review of patient's allergies indicates:  No Known Allergies  Past Medical History:   Diagnosis Date    BMI 45.0-49.9, adult 06/20/2018    Cervicalgia 04/07/2021    Diabetes mellitus, type 2     DJD (degenerative joint disease) 03/15/2019    Edema 01/27/2021    Essential hypertension 04/07/2021    GERD (gastroesophageal reflux disease) 01/13/2021    Hyperlipidemia     Hypertension     Low back pain 06/19/2019    Osteoporosis     Pain in right shoulder 04/07/2021    Tachycardia 04/07/2021     Past Surgical History:   Procedure Laterality Date    CARPAL TUNNEL RELEASE Left 2/17/2022    Procedure: RELEASE, CARPAL TUNNEL;  Surgeon: Wilber Crocker MD;  Location: [facility] OR;  Service: Orthopedics;  Laterality: Left;    PATELLA TENDONS REPAIR Bilateral     RADIOFREQUENCY ABLATION Left 2/11/2022    Procedure: Left GSV RF Ablation;  Surgeon: Collins Canada DO;  Location: [facility] OR;  Service: Vascular;  Laterality: Left;    TORN MENISCUS Left     ULNAR NERVE TRANSPOSITION Left 2/17/2022    Procedure: TRANSPOSITION, NERVE, ULNAR;  Surgeon: Wilber Crocker MD;  Location: [facility] OR;  Service: Orthopedics;  Laterality: Left;    WRIST FRACTURE SURGERY Left      Family History   Problem Relation Age of Onset    Diabetes Mother         AGE 78    Diabetes Father         AGE 54    Hypertension Father     No Known Problems Brother         AGE 55    No Known Problems Brother         AGE 49    Hypertension Brother         AGE 47    No Known  "Problems Daughter     No Known Problems Daughter     No Known Problems Daughter     No Known Problems Son     No Known Problems Son      Social History     Tobacco Use    Smoking status: Never Smoker    Smokeless tobacco: Never Used   Substance Use Topics    Alcohol use: Yes    Drug use: Never     Review of Systems   Constitutional: Negative.    HENT: Negative.    Eyes: Negative.    Respiratory: Negative.    Cardiovascular: Negative.    Gastrointestinal: Negative.    Genitourinary: Negative.    Musculoskeletal: Positive for arthralgias (  Has a history of "arthritis") and back pain. Negative for gait problem, joint swelling, myalgias, neck pain and neck stiffness.   Neurological: Positive for numbness ( reports numbness down both legs). Negative for dizziness, tremors, seizures, syncope, facial asymmetry, speech difficulty, weakness, light-headedness and headaches.   Hematological: Negative.    All other systems reviewed and are negative.      Physical Exam     Initial Vitals   BP Pulse Resp Temp SpO2   05/21/22 0722 05/21/22 0722 05/21/22 0722 05/21/22 0722 05/21/22 0723   129/85 104 18 97.5 °F (36.4 °C) 99 %      MAP       --                Physical Exam    Nursing note and vitals reviewed.  Constitutional: He appears well-developed and well-nourished. He is not diaphoretic. No distress.   HENT:   Head: Normocephalic.   Right Ear: External ear normal.   Left Ear: External ear normal.   Nose: Nose normal.   Mouth/Throat: Oropharynx is clear and moist. No oropharyngeal exudate.   Eyes: Conjunctivae and EOM are normal. Pupils are equal, round, and reactive to light. Right eye exhibits no discharge. Left eye exhibits no discharge.   Neck: Neck supple. No tracheal deviation present. No JVD present.   Normal range of motion.  Cardiovascular: Normal rate, regular rhythm, normal heart sounds and intact distal pulses.   No murmur heard.  Pulmonary/Chest: Breath sounds normal. No stridor. No respiratory distress. He " has no wheezes. He has no rhonchi. He has no rales. He exhibits no tenderness.   Abdominal: Abdomen is soft. Bowel sounds are normal. He exhibits no distension. There is no abdominal tenderness.   Musculoskeletal:         General: Tenderness ( patient has tenderness of the right paraspinal muscles and the proximal gluteus muscle on the right with minimal discomfort noted on the left, no bony midline point tenderness.  Has subjective  numbness down both legs posteriorly no objective sensory defi) present. No edema. Normal range of motion.      Cervical back: Normal range of motion and neck supple.     Lymphadenopathy:     He has no cervical adenopathy.   Neurological: He is alert and oriented to person, place, and time. He has normal strength and normal reflexes. No cranial nerve deficit or sensory deficit. GCS score is 15. GCS eye subscore is 4. GCS verbal subscore is 5. GCS motor subscore is 6.   Skin: Skin is warm and dry. Capillary refill takes less than 2 seconds. No rash noted. No erythema. No pallor.   Psychiatric: He has a normal mood and affect. His behavior is normal.         Medical Screening Exam   See Full Note    ED Course   Procedures  Labs Reviewed - No data to display       Imaging Results          CT Lumbar Spine Without Contrast (Final result)  Result time 05/21/22 08:15:13    Final result by Corey Carcamo DO (05/21/22 08:15:13)                 Impression:      No convincing CT evidence of acute injury involving the osseous lumbar spine.  No convincing high-grade spinal canal or neural foraminal narrowing. Nonspecific edema within the subcutaneous soft tissues dorsally at midline.    The CT exam was performed using one or more of the following dose    reduction techniques- Automated exposure control, adjustment of the mA    and/or kV according to patient size, and/or use of iterative    reconstructed technique.    Point of Service: [facility]      Electronically signed by: Corey  Carlos Alberto  Date:    05/21/2022  Time:    08:15             Narrative:    EXAMINATION:  CT LUMBAR SPINE WITHOUT CONTRAST    CLINICAL HISTORY:  Lumbar radiculopathy, symptoms persist with conservative treatment;    COMPARISON:  None    TECHNIQUE:  Multiple axial tomographic images of the lumbar spine were obtained without the use of intravenous contrast. Coronal and sagittal reformatted images provided.    FINDINGS:  Lumbar vertebral body heights and alignment appear maintained.  Mild degenerative change of the lumbar spine without high-grade spinal canal or neural foraminal narrowing.  Nonspecific edema within the subcutaneous soft tissues dorsally at midline.                                 Medications   ketorolac injection 30 mg (has no administration in time range)   orphenadrine injection 60 mg (has no administration in time range)     Medical Decision Making:   Clinical Tests:   Radiological Study: Reviewed   radiologist's report for CT of the lumbar spine reviewed, indicates degenerative changes no high-grade spinal stenosis or neuroforaminal narrowing.                 Clinical Impression:   Final diagnoses:  [M54.17] Lumbosacral radiculopathy (Primary)          ED Disposition Condition    Discharge Stable        ED Prescriptions     Medication Sig Dispense Start Date End Date Auth. Provider    naproxen (NAPROSYN) 500 MG tablet Take 1 tablet (500 mg total) by mouth 2 (two) times daily as needed (pain). 14 tablet 5/21/2022 5/28/2022 Scott Oconnell DO    orphenadrine (NORFLEX) 100 mg tablet Take 1 tablet (100 mg total) by mouth 2 (two) times daily. for 10 days 20 tablet 5/21/2022 5/31/2022 Scott Oconnell DO        Follow-up Information     Follow up With Specialties Details Why Contact Info    Huey Mtz DNP, FNP-C Family Medicine Schedule an appointment as soon as possible for a visit on 5/23/2022 to recheck and for further evaluation, you may need an MRI scan. [address] [facility]  [facility]  [place] [address]  [phone]             Scott Oconnell DO  05/21/22 0844

## 2022-05-22 ENCOUNTER — TELEPHONE (OUTPATIENT)
Dept: EMERGENCY MEDICINE | Facility: HOSPITAL | Age: 52
End: 2022-05-22
Payer: COMMERCIAL

## 2022-06-14 ENCOUNTER — OFFICE VISIT (OUTPATIENT)
Dept: NEUROLOGY | Facility: CLINIC | Age: 52
End: 2022-06-14
Payer: COMMERCIAL

## 2022-06-14 VITALS
WEIGHT: 295 LBS | BODY MASS INDEX: 44.71 KG/M2 | SYSTOLIC BLOOD PRESSURE: 136 MMHG | HEIGHT: 68 IN | OXYGEN SATURATION: 98 % | HEART RATE: 108 BPM | DIASTOLIC BLOOD PRESSURE: 80 MMHG | RESPIRATION RATE: 18 BRPM

## 2022-06-14 DIAGNOSIS — G56.21 ULNAR NERVE COMPRESSION, RIGHT: ICD-10-CM

## 2022-06-14 DIAGNOSIS — G56.22 ULNAR NERVE COMPRESSION, LEFT: ICD-10-CM

## 2022-06-14 DIAGNOSIS — G56.03 CARPAL TUNNEL SYNDROME ON BOTH SIDES: Primary | ICD-10-CM

## 2022-06-14 PROCEDURE — 1159F PR MEDICATION LIST DOCUMENTED IN MEDICAL RECORD: ICD-10-PCS | Mod: ,,, | Performed by: NURSE PRACTITIONER

## 2022-06-14 PROCEDURE — 99215 OFFICE O/P EST HI 40 MIN: CPT | Mod: PBBFAC | Performed by: NURSE PRACTITIONER

## 2022-06-14 PROCEDURE — 1160F RVW MEDS BY RX/DR IN RCRD: CPT | Mod: ,,, | Performed by: NURSE PRACTITIONER

## 2022-06-14 PROCEDURE — 99213 PR OFFICE/OUTPT VISIT, EST, LEVL III, 20-29 MIN: ICD-10-PCS | Mod: S$PBB,,, | Performed by: NURSE PRACTITIONER

## 2022-06-14 PROCEDURE — 3008F BODY MASS INDEX DOCD: CPT | Mod: ,,, | Performed by: NURSE PRACTITIONER

## 2022-06-14 PROCEDURE — 3008F PR BODY MASS INDEX (BMI) DOCUMENTED: ICD-10-PCS | Mod: ,,, | Performed by: NURSE PRACTITIONER

## 2022-06-14 PROCEDURE — 3075F PR MOST RECENT SYSTOLIC BLOOD PRESS GE 130-139MM HG: ICD-10-PCS | Mod: ,,, | Performed by: NURSE PRACTITIONER

## 2022-06-14 PROCEDURE — 3075F SYST BP GE 130 - 139MM HG: CPT | Mod: ,,, | Performed by: NURSE PRACTITIONER

## 2022-06-14 PROCEDURE — 1159F MED LIST DOCD IN RCRD: CPT | Mod: ,,, | Performed by: NURSE PRACTITIONER

## 2022-06-14 PROCEDURE — 1160F PR REVIEW ALL MEDS BY PRESCRIBER/CLIN PHARMACIST DOCUMENTED: ICD-10-PCS | Mod: ,,, | Performed by: NURSE PRACTITIONER

## 2022-06-14 PROCEDURE — 3079F PR MOST RECENT DIASTOLIC BLOOD PRESSURE 80-89 MM HG: ICD-10-PCS | Mod: ,,, | Performed by: NURSE PRACTITIONER

## 2022-06-14 PROCEDURE — 99213 OFFICE O/P EST LOW 20 MIN: CPT | Mod: S$PBB,,, | Performed by: NURSE PRACTITIONER

## 2022-06-14 PROCEDURE — 3079F DIAST BP 80-89 MM HG: CPT | Mod: ,,, | Performed by: NURSE PRACTITIONER

## 2022-06-14 RX ORDER — GABAPENTIN 100 MG/1
100 CAPSULE ORAL 3 TIMES DAILY
Qty: 90 CAPSULE | Refills: 5 | Status: SHIPPED | OUTPATIENT
Start: 2022-06-14 | End: 2023-01-26 | Stop reason: SDUPTHER

## 2022-06-14 NOTE — PROGRESS NOTES
Subjective:       Patient ID: Papo Bell is a 51 y.o. male     Chief Complaint:    Chief Complaint   Patient presents with    Follow-up     Follow up carpal tunnel        Allergies:  Patient has no known allergies.    Current Medications:    Outpatient Encounter Medications as of 6/14/2022   Medication Sig Dispense Refill    acetaminophen-caff-dihydrocod 320.5-30-16 mg Cap       amLODIPine (NORVASC) 10 MG tablet TAKE 1 TABLET DAILY 90 tablet 1    atorvastatin (LIPITOR) 20 MG tablet TAKE 1 TABLET EVERY EVENING 90 tablet 1    ergocalciferol (ERGOCALCIFEROL) 50,000 unit Cap Take 1 capsule by mouth weekly for 12 weeks then take 1 capsule monthly after that 12 capsule 2    hydroCHLOROthiazide (MICROZIDE) 12.5 mg capsule       omega 3-dha-epa-fish oil (FISH OIL) 1,000 mg (120 mg-180 mg) Cap Take 1 capsule by mouth once daily. 90 capsule 3    omeprazole (PRILOSEC) 40 MG capsule TAKE 1 CAPSULE EVERY       MORNING 90 capsule 1    oxyCODONE-acetaminophen (PERCOCET)  mg per tablet Take 1 tablet by mouth every 6 (six) hours as needed for Pain. 28 tablet 0    potassium chloride SA (K-DUR,KLOR-CON) 20 MEQ tablet Take 1 tablet (20 mEq total) by mouth once daily. 30 tablet 5    timolol maleate 0.5% (TIMOPTIC) 0.5 % Drop PUT 1 DROP INTO AFFECTED EYE(S) EVERY MORNING      traMADoL (ULTRAM) 50 mg tablet TAKE 1-2 TABLETS BY MOUTH 3 TIMES A DAY FOR PAIN.      [DISCONTINUED] gabapentin (NEURONTIN) 100 MG capsule Take 1 capsule (100 mg total) by mouth 3 (three) times daily. 90 capsule 11    gabapentin (NEURONTIN) 100 MG capsule Take 1 capsule (100 mg total) by mouth 3 (three) times daily. 90 capsule 5    metFORMIN (GLUCOPHAGE) 500 MG tablet TAKE 1 TABLET DAILY WITH   BREAKFAST 90 tablet 1     No facility-administered encounter medications on file as of 6/14/2022.       History of Present Illness  50 y/o AAM following in clinic for finding of bilateral carpal tunnel syndrome.    Has peripheral vascular disease,  followed by Dr. Canada.  Recent ablation per the EMR with reported improved symptoms in the lower extremities.    EMG/NCS done 11/15/21 showed moderately severe left and mild right carpal tunnel.  There was also noted mild to moderate bilateral ulnar nerve neuropathies as well.  He had release on the LUE done in early March of 2022.  I see the initial followup with Dr. Crocker but no followup since that time.  He reports the RUE is not giving him a lot of trouble so he is holding on surgery at this time.         Review of Systems  Review of Systems   Constitutional: Negative for diaphoresis and fever.   HENT: Negative for congestion, hearing loss and tinnitus.    Eyes: Negative for blurred vision, double vision, photophobia, discharge and redness.   Respiratory: Negative for cough and shortness of breath.    Cardiovascular: Negative for chest pain.   Gastrointestinal: Negative for abdominal pain, nausea and vomiting.   Musculoskeletal: Negative for back pain, joint pain, myalgias and neck pain.   Skin: Negative for itching and rash.   Neurological: Positive for tingling and sensory change. Negative for dizziness, tremors, speech change, focal weakness, seizures, loss of consciousness, weakness and headaches.   Psychiatric/Behavioral: Negative for depression, hallucinations and memory loss. The patient does not have insomnia.    All other systems reviewed and are negative.     Objective:     NEUROLOGICAL EXAMINATION:     MENTAL STATUS   Oriented to person, place, and time.   Registration: recalls 3 of 3 objects. Recall at 5 minutes: recalls 3 of 3 objects.   Attention: normal. Concentration: normal.   Speech: speech is normal   Level of consciousness: alert  Knowledge: good and consistent with education.   Normal comprehension.     CRANIAL NERVES     CN II   Visual fields full to confrontation.   Visual acuity: normal  Right visual field deficit: none  Left visual field deficit: none     CN III, IV, VI   Pupils are  equal, round, and reactive to light.  Extraocular motions are normal.   Right pupil: Size: 3 mm. Shape: regular. Reactivity: brisk. Consensual response: intact. Accommodation: intact.   Left pupil: Size: 3 mm. Shape: regular. Reactivity: brisk. Consensual response: intact. Accommodation: intact.   CN III: no CN III palsy  CN VI: no CN VI palsy  Nystagmus: none   Diplopia: none  Upgaze: normal  Downgaze: normal  Conjugate gaze: present  Vestibulo-ocular reflex: present    CN V   Facial sensation intact.   Right facial sensation deficit: none  Left facial sensation deficit: none  Right corneal reflex: normal  Left corneal reflex: normal    CN VII   Facial expression full, symmetric.   Right facial weakness: none  Left facial weakness: none  Right taste: normal  Left taste: normal    CN VIII   CN VIII normal.   Hearing: intact    CN IX, X   CN IX normal.   CN X normal.   Palate: symmetric    CN XI   CN XI normal.   Right sternocleidomastoid strength: normal  Left sternocleidomastoid strength: normal  Right trapezius strength: normal  Left trapezius strength: normal    CN XII   CN XII normal.   Tongue: not atrophic  Fasciculations: absent  Tongue deviation: none    MOTOR EXAM   Muscle bulk: normal  Overall muscle tone: normal  Right arm tone: normal  Left arm tone: normal  Right arm pronator drift: absent  Left arm pronator drift: absent  Right leg tone: normal  Left leg tone: normal    Strength   Right neck flexion: 5/5  Left neck flexion: 5/5  Right neck extension: 5/5  Left neck extension: 5/5  Right deltoid: 5/5  Left deltoid: 5/5  Right biceps: 5/5  Left biceps: 5/5  Right triceps: 5/5  Left triceps: 5/5  Right wrist flexion: 5/5  Left wrist flexion: 5/5  Right wrist extension: 5/5  Left wrist extension: 5/5  Right interossei: 5/5  Left interossei: 5/5  Right iliopsoas: 5/5  Left iliopsoas: 5/5  Right quadriceps: 5/5  Left quadriceps: 5/5  Right hamstrin/5  Left hamstrin/5  Right anterior tibial: 5/5  Left  anterior tibial: 5/5  Right posterior tibial: 5/5  Left posterior tibial: 5/5  Right gastroc: 5/5  Left gastroc: 5/5    REFLEXES     Reflexes   Right brachioradialis: 2+  Left brachioradialis: 2+  Right biceps: 2+  Left biceps: 2+  Right triceps: 2+  Left triceps: 2+  Right patellar: 2+  Left patellar: 2+  Right achilles: 2+  Left achilles: 2+  Right : 2+  Left : 2+  Right plantar: normal  Left plantar: normal  Right Moss: absent  Left Moss: absent  Right ankle clonus: absent  Left ankle clonus: absent  Right pendular knee jerk: absent  Left pendular knee jerk: absent    SENSORY EXAM   Light touch normal.   Right arm light touch: normal  Left arm light touch: normal  Right leg light touch: normal  Left leg light touch: normal  Vibration normal.   Right arm vibration: normal  Left arm vibration: normal  Right leg vibration: normal  Left leg vibration: normal  Proprioception normal.   Right arm proprioception: normal  Left arm proprioception: normal  Right leg proprioception: normal  Left leg proprioception: normal  Right arm pinprick: decreased from fingers  Left arm pinprick: decreased from fingers  Right leg pinprick: normal  Left leg pinprick: normal  Graphesthesia: normal  Romberg: negative  Stereognosis: normal    GAIT AND COORDINATION     Gait  Gait: normal     Coordination   Finger to nose coordination: normal  Heel to shin coordination: normal  Tandem walking coordination: normal    Tremor   Resting tremor: absent  Intention tremor: absent  Action tremor: absent       Physical Exam  Vitals and nursing note reviewed.   Constitutional:       Appearance: Normal appearance.   HENT:      Head: Normocephalic.   Eyes:      Extraocular Movements: EOM normal.      Pupils: Pupils are equal, round, and reactive to light.   Cardiovascular:      Rate and Rhythm: Normal rate and regular rhythm.      Pulses: Normal pulses.      Heart sounds: Normal heart sounds.   Pulmonary:      Effort: Pulmonary effort is  normal.      Breath sounds: Normal breath sounds.   Musculoskeletal:         General: Normal range of motion.      Cervical back: Normal range of motion and neck supple.   Skin:     General: Skin is warm and dry.   Neurological:      General: No focal deficit present.      Mental Status: He is alert and oriented to person, place, and time.      Cranial Nerves: No cranial nerve deficit.      Sensory: No sensory deficit.      Motor: No weakness.      Coordination: Coordination normal. Finger-Nose-Finger Test, Heel to Shin Test and Romberg Test normal.      Gait: Gait is intact. Gait and tandem walk normal.      Deep Tendon Reflexes: Reflexes normal.      Reflex Scores:       Tricep reflexes are 2+ on the right side and 2+ on the left side.       Bicep reflexes are 2+ on the right side and 2+ on the left side.       Brachioradialis reflexes are 2+ on the right side and 2+ on the left side.       Patellar reflexes are 2+ on the right side and 2+ on the left side.       Achilles reflexes are 2+ on the right side and 2+ on the left side.  Psychiatric:         Mood and Affect: Mood normal.         Speech: Speech normal.         Behavior: Behavior normal.          Assessment:     Problem List Items Addressed This Visit        Neuro    Carpal tunnel syndrome on both sides - Primary    Relevant Medications    gabapentin (NEURONTIN) 100 MG capsule    Ulnar nerve compression, left    Relevant Medications    gabapentin (NEURONTIN) 100 MG capsule    Ulnar nerve compression, right    Relevant Medications    gabapentin (NEURONTIN) 100 MG capsule           Primary Diagnosis and ICD10  Carpal tunnel syndrome on both sides [G56.03]    Plan:     Patient Instructions   1. Continue use of carpal tunnel braces as needed    2. Continue neurontin 100mg three times daily      Medications Discontinued During This Encounter   Medication Reason    gabapentin (NEURONTIN) 100 MG capsule Reorder       Requested Prescriptions     Signed  Prescriptions Disp Refills    gabapentin (NEURONTIN) 100 MG capsule 90 capsule 5     Sig: Take 1 capsule (100 mg total) by mouth 3 (three) times daily.       No orders of the defined types were placed in this encounter.

## 2022-06-14 NOTE — PATIENT INSTRUCTIONS
1. Continue use of carpal tunnel braces as needed    2. Continue neurontin 100mg three times daily

## 2022-06-23 ENCOUNTER — TELEPHONE (OUTPATIENT)
Dept: PRIMARY CARE CLINIC | Facility: CLINIC | Age: 52
End: 2022-06-23
Payer: COMMERCIAL

## 2022-07-06 ENCOUNTER — OFFICE VISIT (OUTPATIENT)
Dept: PRIMARY CARE CLINIC | Facility: CLINIC | Age: 52
End: 2022-07-06
Payer: COMMERCIAL

## 2022-07-06 ENCOUNTER — TELEPHONE (OUTPATIENT)
Dept: PRIMARY CARE CLINIC | Facility: CLINIC | Age: 52
End: 2022-07-06
Payer: COMMERCIAL

## 2022-07-06 VITALS
TEMPERATURE: 98 F | DIASTOLIC BLOOD PRESSURE: 74 MMHG | BODY MASS INDEX: 44.1 KG/M2 | RESPIRATION RATE: 20 BRPM | HEIGHT: 68 IN | OXYGEN SATURATION: 98 % | HEART RATE: 103 BPM | SYSTOLIC BLOOD PRESSURE: 122 MMHG | WEIGHT: 291 LBS

## 2022-07-06 DIAGNOSIS — Z01.818 PRE-OPERATIVE CLEARANCE: Primary | ICD-10-CM

## 2022-07-06 DIAGNOSIS — E11.9 TYPE 2 DIABETES MELLITUS WITHOUT COMPLICATION, WITHOUT LONG-TERM CURRENT USE OF INSULIN: ICD-10-CM

## 2022-07-06 DIAGNOSIS — I10 ESSENTIAL HYPERTENSION: ICD-10-CM

## 2022-07-06 LAB — GLUCOSE SERPL-MCNC: 129 MG/DL (ref 70–110)

## 2022-07-06 PROCEDURE — 3008F BODY MASS INDEX DOCD: CPT | Mod: CPTII,,, | Performed by: NURSE PRACTITIONER

## 2022-07-06 PROCEDURE — 99213 PR OFFICE/OUTPT VISIT, EST, LEVL III, 20-29 MIN: ICD-10-PCS | Mod: ,,, | Performed by: NURSE PRACTITIONER

## 2022-07-06 PROCEDURE — 1159F PR MEDICATION LIST DOCUMENTED IN MEDICAL RECORD: ICD-10-PCS | Mod: CPTII,,, | Performed by: NURSE PRACTITIONER

## 2022-07-06 PROCEDURE — 1159F MED LIST DOCD IN RCRD: CPT | Mod: CPTII,,, | Performed by: NURSE PRACTITIONER

## 2022-07-06 PROCEDURE — 1160F RVW MEDS BY RX/DR IN RCRD: CPT | Mod: CPTII,,, | Performed by: NURSE PRACTITIONER

## 2022-07-06 PROCEDURE — 1160F PR REVIEW ALL MEDS BY PRESCRIBER/CLIN PHARMACIST DOCUMENTED: ICD-10-PCS | Mod: CPTII,,, | Performed by: NURSE PRACTITIONER

## 2022-07-06 PROCEDURE — 3008F PR BODY MASS INDEX (BMI) DOCUMENTED: ICD-10-PCS | Mod: CPTII,,, | Performed by: NURSE PRACTITIONER

## 2022-07-06 PROCEDURE — 93000 ELECTROCARDIOGRAM COMPLETE: CPT | Mod: ,,, | Performed by: NURSE PRACTITIONER

## 2022-07-06 PROCEDURE — 3078F PR MOST RECENT DIASTOLIC BLOOD PRESSURE < 80 MM HG: ICD-10-PCS | Mod: CPTII,,, | Performed by: NURSE PRACTITIONER

## 2022-07-06 PROCEDURE — 3078F DIAST BP <80 MM HG: CPT | Mod: CPTII,,, | Performed by: NURSE PRACTITIONER

## 2022-07-06 PROCEDURE — 99213 OFFICE O/P EST LOW 20 MIN: CPT | Mod: ,,, | Performed by: NURSE PRACTITIONER

## 2022-07-06 PROCEDURE — 3074F PR MOST RECENT SYSTOLIC BLOOD PRESSURE < 130 MM HG: ICD-10-PCS | Mod: CPTII,,, | Performed by: NURSE PRACTITIONER

## 2022-07-06 PROCEDURE — 3074F SYST BP LT 130 MM HG: CPT | Mod: CPTII,,, | Performed by: NURSE PRACTITIONER

## 2022-07-06 PROCEDURE — 93000 PR ELECTROCARDIOGRAM, COMPLETE: ICD-10-PCS | Mod: ,,, | Performed by: NURSE PRACTITIONER

## 2022-07-06 NOTE — PROGRESS NOTES
[facility]  Primary Care       PATIENT NAME: Papo Bell   : 1970    AGE: 51 y.o. DATE: 2022    MRN: [id number]        Reason for Visit / Chief Complaint:  Other Misc (Presurgical clearance Rt Knee)     Subjective:     HPI: Patient here for surgical clearance for right total knee arthroplasty.     Nurse attempted to draw patient's labs; states patient became anxious and insisted that she stopped drawing his blood; states he wanted to get up and go to the bathroom; unable to complete lab draw; patient ambulated to restroom without difficulty and returned to room.     Patient states he became a little nauseated during blood draw but states he feels better now; water was given to patient.    EKG was done without difficulty; patient states he feels fine; denies any chest pain or shortness of breath.          Review of Systems: Review of Systems   Constitutional: Negative for fever.   Respiratory: Negative for cough and shortness of breath.    Cardiovascular: Negative for chest pain.   Genitourinary: Negative for dysuria.   Musculoskeletal: Negative for gait problem.   Skin: Negative for rash.   Neurological: Negative for headaches.          Review of patient's allergies indicates:  No Known Allergies     Med List:  Current Outpatient Medications on File Prior to Visit   Medication Sig Dispense Refill    acetaminophen-caff-dihydrocod 320.5-30-16 mg Cap       amLODIPine (NORVASC) 10 MG tablet TAKE 1 TABLET DAILY 90 tablet 1    atorvastatin (LIPITOR) 20 MG tablet TAKE 1 TABLET EVERY EVENING 90 tablet 1    ergocalciferol (ERGOCALCIFEROL) 50,000 unit Cap Take 1 capsule by mouth weekly for 12 weeks then take 1 capsule monthly after that 12 capsule 2    gabapentin (NEURONTIN) 100 MG capsule Take 1 capsule (100 mg total) by mouth 3 (three) times daily. 90 capsule 5    hydroCHLOROthiazide (MICROZIDE) 12.5 mg capsule       metFORMIN (GLUCOPHAGE) 500 MG tablet TAKE 1 TABLET DAILY WITH    BREAKFAST 90 tablet 1    omega 3-dha-epa-fish oil (FISH OIL) 1,000 mg (120 mg-180 mg) Cap Take 1 capsule by mouth once daily. 90 capsule 3    omeprazole (PRILOSEC) 40 MG capsule TAKE 1 CAPSULE EVERY       MORNING 90 capsule 1    oxyCODONE-acetaminophen (PERCOCET)  mg per tablet Take 1 tablet by mouth every 6 (six) hours as needed for Pain. 28 tablet 0    potassium chloride SA (K-DUR,KLOR-CON) 20 MEQ tablet Take 1 tablet (20 mEq total) by mouth once daily. 30 tablet 5    timolol maleate 0.5% (TIMOPTIC) 0.5 % Drop PUT 1 DROP INTO AFFECTED EYE(S) EVERY MORNING      traMADoL (ULTRAM) 50 mg tablet TAKE 1-2 TABLETS BY MOUTH 3 TIMES A DAY FOR PAIN.       No current facility-administered medications on file prior to visit.       Medical/Social/Family History:  Past Medical History:   Diagnosis Date    BMI 45.0-49.9, adult 06/20/2018    Cervicalgia 04/07/2021    Diabetes mellitus, type 2     DJD (degenerative joint disease) 03/15/2019    Edema 01/27/2021    Essential hypertension 04/07/2021    GERD (gastroesophageal reflux disease) 01/13/2021    Hyperlipidemia     Hypertension     Low back pain 06/19/2019    Osteoporosis     Pain in right shoulder 04/07/2021    Tachycardia 04/07/2021      Social History     Tobacco Use   Smoking Status Never Smoker   Smokeless Tobacco Never Used      Social History     Substance and Sexual Activity   Alcohol Use Yes       Family History   Problem Relation Age of Onset    Diabetes Mother         AGE 78    Diabetes Father         AGE 54    Hypertension Father     No Known Problems Brother         AGE 55    No Known Problems Brother         AGE 49    Hypertension Brother         AGE 47    No Known Problems Daughter     No Known Problems Daughter     No Known Problems Daughter     No Known Problems Son     No Known Problems Son       Past Surgical History:   Procedure Laterality Date    CARPAL TUNNEL RELEASE Left 2/17/2022    Procedure: RELEASE, CARPAL  "TUNNEL;  Surgeon: Wilber Crocker MD;  Location: [facility] OR;  Service: Orthopedics;  Laterality: Left;    PATELLA TENDONS REPAIR Bilateral     RADIOFREQUENCY ABLATION Left 2/11/2022    Procedure: Left GSV RF Ablation;  Surgeon: Collins Canada DO;  Location: [facility] OR;  Service: Vascular;  Laterality: Left;    TORN MENISCUS Left     ULNAR NERVE TRANSPOSITION Left 2/17/2022    Procedure: TRANSPOSITION, NERVE, ULNAR;  Surgeon: Wilber Crocker MD;  Location: [facility] OR;  Service: Orthopedics;  Laterality: Left;    WRIST FRACTURE SURGERY Left         There is no immunization history on file for this patient.       Objective:      Vitals:    07/06/22 1008 07/06/22 1014   BP: 122/74    BP Location: Left arm    Patient Position: Sitting    BP Method: Large (Automatic)    Pulse: 109 103   Resp: 20    Temp: 97.9 °F (36.6 °C)    TempSrc: Oral    SpO2: 98%    Weight: 132 kg (291 lb)    Height: 5' 8" (1.727 m)      Body mass index is 44.25 kg/m².     Physical Exam: Physical Exam  Constitutional:       Appearance: He is obese.   HENT:      Head: Normocephalic.      Mouth/Throat:      Mouth: Mucous membranes are moist.   Eyes:      Pupils: Pupils are equal, round, and reactive to light.   Cardiovascular:      Rate and Rhythm: Regular rhythm. Tachycardia present.   Pulmonary:      Effort: Pulmonary effort is normal. No respiratory distress.      Breath sounds: Normal breath sounds. No wheezing or rhonchi.   Musculoskeletal:         General: Normal range of motion.      Cervical back: Normal range of motion.   Skin:     General: Skin is warm and dry.   Neurological:      General: No focal deficit present.      Mental Status: He is alert and oriented to person, place, and time.      Gait: Gait normal.   Psychiatric:         Thought Content: Thought content normal.         Judgment: Judgment normal.      Comments: Patient is calm with normal mood and behavior after initially having some anxiety during " attempted lab draw.               Component      Latest Ref Rng & Units 7/6/2022             POC Glucose      70 - 110 MG/ (A)     Assessment:          ICD-10-CM ICD-9-CM   1. Pre-operative clearance  Z01.818 V72.84   2. Type 2 diabetes mellitus without complication, without long-term current use of insulin  E11.9 250.00   3. Essential hypertension  I10 401.9        Plan:       Pre-operative clearance  -     CBC Auto Differential; Future; Expected date: 07/06/2022  -     PT and PTT; Future; Expected date: 07/06/2022  -     Comprehensive Metabolic Panel; Future; Expected date: 07/06/2022  -     X-Ray Chest PA And Lateral; Future; Expected date: 07/06/2022  -     POCT EKG 12-LEAD (NOT FOR [facility] USE)    Type 2 diabetes mellitus without complication, without long-term current use of insulin  -     Hemoglobin A1C; Future; Expected date: 07/06/2022  -     Lipid Panel; Future; Expected date: 07/06/2022  -     POCT glucose    Essential hypertension  -     CBC Auto Differential; Future; Expected date: 07/06/2022  -     Comprehensive Metabolic Panel; Future; Expected date: 07/06/2022  -     Lipid Panel; Future; Expected date: 07/06/2022      Will call [facility] in [place], [place] and inform of inability to obtain all labs.    New & refilled meds:  Requested Prescriptions      No prescriptions requested or ordered in this encounter       Follow up if symptoms worsen or fail to improve.     Patient Instructions   Patient Education       Controlling Your Blood Pressure Through Lifestyle   The Basics   Written by the doctors and editors at [facility]   What does my lifestyle have to do with my blood pressure? -- The things you do and the foods you eat have a big effect on your blood pressure and your overall health. Following the right lifestyle can:  · Lower your blood pressure or keep you from getting high blood pressure in the first place  · Reduce your need for blood pressure medicines  · Make  "medicines for high blood pressure work better, if you do take them  · Lower the chances that you'll have a heart attack or stroke, or develop kidney disease  Which lifestyle choices will help lower my blood pressure? -- Here's what you can do:  · Lose weight (if you are overweight)  · Choose a diet rich in fruits, vegetables, and low-fat dairy products, and low in meats, sweets, and refined grains  · Eat less salt (sodium)  · Do something active for at least 30 minutes a day on most days of the week  · Limit the amount of alcohol you drink  If you have high blood pressure, it's also very important to quit smoking (if you smoke). Quitting smoking might not bring your blood pressure down. But it will lower the chances that you'll have a heart attack or stroke, and it will help you feel better and live longer.  Start low and go slow -- The changes listed above might sound like a lot, but don't worry. You don't have to change everything all at once. The key to improving your lifestyle is to "start low and go slow." Choose 1 small, specific thing to change and try doing it for a while. If it works for you, keep doing it until it becomes a habit. If it doesn't, don't give up. Choose something else to change and see how that goes.  Let's say, for example, that you would like to improve your diet. If you're the type of person who eats cheeseburgers and French fries all the time, you can't switch to eating just salads from one day to the next. When people try to make changes like that, they often fail. Then they feel frustrated and tend to give up. So instead of trying to change everything about your diet in 1 day, change 1 or 2 small things about your diet and give yourself time to get used to those changes. For instance, keep the cheeseburger but give up the French fries. Or eat the same things but cut your portions in half.  As you find things that you are able to change and stick with, keep adding new changes. In time, " "you will see that you can actually change a lot. You just have to get used to the changes slowly.  Lose weight -- When people think about losing weight, they sometimes make it more complicated than it really is. To lose weight, you have to either eat less or move more. If you do both of those things, it's even better. But there is no single weight-loss diet or activity that's better than any other. When it comes to weight loss, the most effective plan is the one that you'll stick with.  Improve your diet -- There is no single diet that is right for everyone. But in general, a healthy diet can include:  · Lots of fruits, vegetables, and whole grains  · Some beans, peas, lentils, chickpeas, and similar foods  · Some nuts, such as walnuts, almonds, and peanuts  · Fat-free or low-fat milk and milk products  · Some fish  To have a healthy diet, it's also important to limit or avoid sugar, sweets, meats, and refined grains. (Refined grains are found in white bread, white rice, most forms of pasta, and most packaged "snack" foods.)  Reduce salt -- Many people think that eating a low-sodium diet means avoiding the salt shaker and not adding salt when cooking. The truth is, not adding salt at the table or when you cook will only help a little. Almost all of the sodium you eat is already in the food you buy at the grocery store or at restaurants (figure 1).  The most important thing you can do to cut down on sodium is to eat less processed food. That means that you should avoid most foods that are sold in cans, boxes, jars, and bags. You should also eat in restaurants less often.  To reduce the amount of sodium you get, buy fresh or fresh-frozen fruits, vegetables, and meats. (Fresh-frozen foods have had nothing added to them before freezing.) Then you can make meals at home, from scratch, with these ingredients.  As with the other changes, don't try to cut out salt all at once. Instead, choose 1 or 2 foods that have a lot of " "sodium and try to replace them with low-sodium choices. When you get used to those low-sodium options, find another food or 2 to change. Then keep going, until all the foods you eat are sodium-free or low in sodium.  Become more active -- If you want to be more active, you don't have to go to the gym or get all sweaty. It is possible to increase your activity level while doing everyday things you enjoy. Walking, gardening, and dancing are just a few of the things that you might try. As with all the other changes, the key is not to do too much too fast. If you don't do any activity now, start by walking for just a few minutes every other day. Do that for a few weeks. If you stick with it, try doing it for longer. But if you find that you don't like walking, try a different activity.  Drink less alcohol -- If you are a woman, do not have more than 1 "standard drink" of alcohol a day. If you are a man, do not have more than 2. A "standard drink" is:  · A can or bottle that has 12 ounces of beer  · A glass that has 5 ounces of wine  · A shot that has 1.5 ounces of whiskey  Where should I start? -- If you want to improve your lifestyle, start by making the changes that you think would be easiest for you. If you used to exercise and just got out of the habit, maybe it would be easy for you to start exercising again. Or if you actually like cooking meals from scratch, maybe the first thing you should focus on is eating home-cooked meals that are low in sodium.  Whatever you tackle first, choose specific, realistic goals, and give yourself a deadline. For example, do not decide that you are going to "exercise more." Instead, decide that you are going to walk for 10 minutes on Monday, Wednesday, and Friday, and that you are going to do this for the next 2 weeks.  When lifestyle changes are too general, people have a hard time following through.  Now go. You can do it!  All topics are updated as new evidence becomes available " and our peer review process is complete.  This topic retrieved from [employer] on: Sep 21, 2021.  Topic 64078 Version 8.0  Release: 29.4.2 - C29.263  © 2021 UpToDate, Inc. and/or its affiliates. All rights reserved.  figure 1: Sources of sodium in your diet     Graphic 45229 Version 2.0    Consumer Information Use and Disclaimer   This information is not specific medical advice and does not replace information you receive from your health care provider. This is only a brief summary of general information. It does NOT include all information about conditions, illnesses, injuries, tests, procedures, treatments, therapies, discharge instructions or life-style choices that may apply to you. You must talk with your health care provider for complete information about your health and treatment options. This information should not be used to decide whether or not to accept your health care provider's advice, instructions or recommendations. Only your health care provider has the knowledge and training to provide advice that is right for you. The use of this information is governed by the [employer] End User License Agreement, available at https://www.RxRevu/en/solutions/Jini/about/link.The use of [employer] content is governed by the [employer] Terms of Use. ©2021 UpToDate, Inc. All rights reserved.  Copyright   © 2021 UpToDate, Inc. and/or its affiliates. All rights reserved.  Patient Education       Diabetes and Diet   The Basics   Written by the doctors and editors at [employer]   Why is diet important in diabetes? -- Diet is important because it is part of diabetes treatment. Many people need to change what they eat and how much they eat to help treat their diabetes. It is important for people to treat their diabetes so that they:  · Keep their blood sugar at or near a normal level  · Prevent long-term problems, such as heart or kidney problems, that can happen in people with diabetes  Changing your diet can also  "help treat obesity, high blood pressure, and high cholesterol. These conditions can affect people with diabetes and can lead to future problems, such as heart attacks or strokes.  Who will work with me to change my diet? -- Your doctor or nurse will work with you to make a food plan to change your diet. They might also recommend that you work with a "dietitian." A dietitian is an expert on food and eating.  Do I need to eat at the same times every day? -- When and how often you should eat depends, in part, on the diabetes medicines you take. For example:  · People who take about the same amount of insulin at the same time each day (called a "fixed regimen") should eat meals at the same times. This is also true for people who take pills that increase insulin levels, such as sulfonylureas. Eating meals at the same time every day helps prevent low blood sugar.  · People who adjust the dose and timing of their insulin each day (called a "flexible regimen") do not always have to eat meals at the same time. That's because they can time their insulin dose for before they plan to eat, and also adjust the dose for how much they plan to eat.  · People who take medicines that don't usually cause low blood sugar, such as metformin, don't have to eat meals at the same time every day.  What do I need to think about when planning what to eat? -- Our bodies break down the food we eat into small pieces called carbohydrates, proteins, and fats.  When planning what to eat, people with diabetes need to think about:  1. Carbohydrates (or "carbs") - Carbohydrates, which are sugars that our bodies use for energy, can raise a person's blood sugar level. Your doctor, nurse, or dietitian will tell you how many carbohydrates you should eat at each meal or snack. Foods that have carbohydrates include:  ? Bread, pasta, and rice  ? Vegetables and fruits  ? Dairy foods  ? Foods and drinks with added sugar  It is best to get your carbohydrates " "from fruits, vegetables, whole grains, and low-fat milk. It is best to avoid drinks with added sugar, like soda, juices, and sports drinks.   1. Protein - Your doctor, nurse, or dietitian will tell you how much protein you should eat each day. It is best to eat lean meats, fish, eggs, beans, peas, soy products, nuts, and seeds.  2. Fats - The type of fat you eat is more important than the amount of fat. "Saturated" and "trans" fats can increase your risk for heart problems, like a heart attack.  ? Foods that have saturated fats include meat, butter, cheese, and ice cream.  ? Foods that have trans fats include processed food with "partially hydrogenated oils" on the ingredient list. This may include fried foods, store bought cookies, muffins, pies, and cakes.  "Monounsaturated" and "polyunsaturated" fats are better for you. Foods with these types of fat include fish, avocado, olive oil, and nuts.  · Calories - People need to eat a certain amount of calories each day to keep their weight the same. People who are overweight and want to lose weight need to eat fewer calories each day.  · Fiber - Eating foods with a lot of fiber can help control a person's blood sugar level. Foods that have a lot of fiber include apples, green beans, peas, beans, lentils, nuts, oatmeal, and whole grains.  · Salt - People who have high blood pressure should not eat foods that contain a lot of salt (also called sodium). People with high blood pressure should also eat healthy foods, such as fruits, vegetables, and low-fat dairy foods.  · Alcohol - Having more than 1 drink (for women) or 2 drinks (for men) a day can raise blood sugar levels. Also, drinks that have fruit juice or soda in them can raise blood sugar levels.  What can I do if I need to lose weight? -- If you need to lose weight, you can:  · Exercise - Try to get at least 30 minutes of physical activity a day, most days of the week. Even gentle exercise, like walking, is good for " your health. Some people with diabetes need to change their medicine dose before they exercise. They might also need to check their blood sugar levels before and after exercising.  · Eat fewer calories - Your doctor, nurse, or dietitian can tell you how many calories you should eat each day in order to lose weight.  If you are worried about your weight, size, or shape, talk with your doctor, nurse, or dietitian. They can help you make changes to improve your health.  Can I eat the same foods as my family? -- Yes. You do not need to eat special foods if you have diabetes. You and your family can eat the same foods. Changing your diet is mostly about eating healthy foods and not eating too much.  What are the other parts of diabetes treatment? -- Besides changing your diet, the other parts of diabetes treatment are:  · Exercise  · Medicines  Some people with diabetes need to learn how to match their diet and exercise with their medicine dose. For example, people who use insulin might need to choose the dose of insulin they give themselves. To choose their dose, they need to think about:  · What they plan to eat at the next meal  · How much exercise they plan to do  · What their blood sugar level is  If the diet and exercise do not match the medicine dose, a person's blood sugar level can get too low or too high. Blood sugar levels that are too low or too high can cause problems.  All topics are updated as new evidence becomes available and our peer review process is complete.  This topic retrieved from [employer] on: Sep 21, 2021.  Topic 47827 Version 7.0  Release: 29.4.2 - C29.263  © 2021 UpToDate, Inc. and/or its affiliates. All rights reserved.  Consumer Information Use and Disclaimer   This information is not specific medical advice and does not replace information you receive from your health care provider. This is only a brief summary of general information. It does NOT include all information about conditions,  illnesses, injuries, tests, procedures, treatments, therapies, discharge instructions or life-style choices that may apply to you. You must talk with your health care provider for complete information about your health and treatment options. This information should not be used to decide whether or not to accept your health care provider's advice, instructions or recommendations. Only your health care provider has the knowledge and training to provide advice that is right for you. The use of this information is governed by the [employer] End User License Agreement, available at https://www.LeKiosk/en/solutions/[employer]/about/link.The use of [employer] content is governed by the [employer] Terms of Use. ©2021 [employer] All rights reserved.  Copyright   © 2021 UpToDate, Inc. and/or its affiliates. All rights reserved.           Signature: Huey Mtz DNP, FNP-C

## 2022-07-06 NOTE — TELEPHONE ENCOUNTER
----- Message from Huey Mtz DNP, FNP-C sent at 7/6/2022  1:22 PM CDT -----  Please notify patient of results; ask if he knows he has a hiatal hernia; can be seen for referral if it gives him any problems.

## 2022-07-06 NOTE — PATIENT INSTRUCTIONS
"Patient Education       Controlling Your Blood Pressure Through Lifestyle   The Basics   Written by the doctors and editors at [facility]   What does my lifestyle have to do with my blood pressure? -- The things you do and the foods you eat have a big effect on your blood pressure and your overall health. Following the right lifestyle can:  Lower your blood pressure or keep you from getting high blood pressure in the first place  Reduce your need for blood pressure medicines  Make medicines for high blood pressure work better, if you do take them  Lower the chances that you'll have a heart attack or stroke, or develop kidney disease  Which lifestyle choices will help lower my blood pressure? -- Here's what you can do:  Lose weight (if you are overweight)  Choose a diet rich in fruits, vegetables, and low-fat dairy products, and low in meats, sweets, and refined grains  Eat less salt (sodium)  Do something active for at least 30 minutes a day on most days of the week  Limit the amount of alcohol you drink  If you have high blood pressure, it's also very important to quit smoking (if you smoke). Quitting smoking might not bring your blood pressure down. But it will lower the chances that you'll have a heart attack or stroke, and it will help you feel better and live longer.  Start low and go slow -- The changes listed above might sound like a lot, but don't worry. You don't have to change everything all at once. The key to improving your lifestyle is to "start low and go slow." Choose 1 small, specific thing to change and try doing it for a while. If it works for you, keep doing it until it becomes a habit. If it doesn't, don't give up. Choose something else to change and see how that goes.  Let's say, for example, that you would like to improve your diet. If you're the type of person who eats cheeseburgers and French fries all the time, you can't switch to eating just salads from one day to the next. When people try to " "make changes like that, they often fail. Then they feel frustrated and tend to give up. So instead of trying to change everything about your diet in 1 day, change 1 or 2 small things about your diet and give yourself time to get used to those changes. For instance, keep the cheeseburger but give up the French fries. Or eat the same things but cut your portions in half.  As you find things that you are able to change and stick with, keep adding new changes. In time, you will see that you can actually change a lot. You just have to get used to the changes slowly.  Lose weight -- When people think about losing weight, they sometimes make it more complicated than it really is. To lose weight, you have to either eat less or move more. If you do both of those things, it's even better. But there is no single weight-loss diet or activity that's better than any other. When it comes to weight loss, the most effective plan is the one that you'll stick with.  Improve your diet -- There is no single diet that is right for everyone. But in general, a healthy diet can include:  Lots of fruits, vegetables, and whole grains  Some beans, peas, lentils, chickpeas, and similar foods  Some nuts, such as walnuts, almonds, and peanuts  Fat-free or low-fat milk and milk products  Some fish  To have a healthy diet, it's also important to limit or avoid sugar, sweets, meats, and refined grains. (Refined grains are found in white bread, white rice, most forms of pasta, and most packaged "snack" foods.)  Reduce salt -- Many people think that eating a low-sodium diet means avoiding the salt shaker and not adding salt when cooking. The truth is, not adding salt at the table or when you cook will only help a little. Almost all of the sodium you eat is already in the food you buy at the grocery store or at restaurants (figure 1).  The most important thing you can do to cut down on sodium is to eat less processed food. That means that you should " "avoid most foods that are sold in cans, boxes, jars, and bags. You should also eat in restaurants less often.  To reduce the amount of sodium you get, buy fresh or fresh-frozen fruits, vegetables, and meats. (Fresh-frozen foods have had nothing added to them before freezing.) Then you can make meals at home, from scratch, with these ingredients.  As with the other changes, don't try to cut out salt all at once. Instead, choose 1 or 2 foods that have a lot of sodium and try to replace them with low-sodium choices. When you get used to those low-sodium options, find another food or 2 to change. Then keep going, until all the foods you eat are sodium-free or low in sodium.  Become more active -- If you want to be more active, you don't have to go to the gym or get all sweaty. It is possible to increase your activity level while doing everyday things you enjoy. Walking, gardening, and dancing are just a few of the things that you might try. As with all the other changes, the key is not to do too much too fast. If you don't do any activity now, start by walking for just a few minutes every other day. Do that for a few weeks. If you stick with it, try doing it for longer. But if you find that you don't like walking, try a different activity.  Drink less alcohol -- If you are a woman, do not have more than 1 "standard drink" of alcohol a day. If you are a man, do not have more than 2. A "standard drink" is:  A can or bottle that has 12 ounces of beer  A glass that has 5 ounces of wine  A shot that has 1.5 ounces of whiskey  Where should I start? -- If you want to improve your lifestyle, start by making the changes that you think would be easiest for you. If you used to exercise and just got out of the habit, maybe it would be easy for you to start exercising again. Or if you actually like cooking meals from scratch, maybe the first thing you should focus on is eating home-cooked meals that are low in sodium.  Whatever you " "tackle first, choose specific, realistic goals, and give yourself a deadline. For example, do not decide that you are going to "exercise more." Instead, decide that you are going to walk for 10 minutes on Monday, Wednesday, and Friday, and that you are going to do this for the next 2 weeks.  When lifestyle changes are too general, people have a hard time following through.  Now go. You can do it!  All topics are updated as new evidence becomes available and our peer review process is complete.  This topic retrieved from [employer] on: Sep 21, 2021.  Topic 37384 Version 8.0  Release: 29.4.2 - C29.263  © 2021 UpToDate, Inc. and/or its affiliates. All rights reserved.  figure 1: Sources of sodium in your diet     Graphic 21994 Version 2.0    Consumer Information Use and Disclaimer   This information is not specific medical advice and does not replace information you receive from your health care provider. This is only a brief summary of general information. It does NOT include all information about conditions, illnesses, injuries, tests, procedures, treatments, therapies, discharge instructions or life-style choices that may apply to you. You must talk with your health care provider for complete information about your health and treatment options. This information should not be used to decide whether or not to accept your health care provider's advice, instructions or recommendations. Only your health care provider has the knowledge and training to provide advice that is right for you. The use of this information is governed by the [employer] End User License Agreement, available at https://www.[employer]urs[employer].me/en/solutions/Markafoni/about/link.The use of [employer] content is governed by the [employer] Terms of Use. ©2021 UpToDate, Inc. All rights reserved.  Copyright   © 2021 UpToDate, Inc. and/or its affiliates. All rights reserved.  Patient Education       Diabetes and Diet   The Basics   Written by the doctors and editors " "at [employer]   Why is diet important in diabetes? -- Diet is important because it is part of diabetes treatment. Many people need to change what they eat and how much they eat to help treat their diabetes. It is important for people to treat their diabetes so that they:  Keep their blood sugar at or near a normal level  Prevent long-term problems, such as heart or kidney problems, that can happen in people with diabetes  Changing your diet can also help treat obesity, high blood pressure, and high cholesterol. These conditions can affect people with diabetes and can lead to future problems, such as heart attacks or strokes.  Who will work with me to change my diet? -- Your doctor or nurse will work with you to make a food plan to change your diet. They might also recommend that you work with a "dietitian." A dietitian is an expert on food and eating.  Do I need to eat at the same times every day? -- When and how often you should eat depends, in part, on the diabetes medicines you take. For example:  People who take about the same amount of insulin at the same time each day (called a "fixed regimen") should eat meals at the same times. This is also true for people who take pills that increase insulin levels, such as sulfonylureas. Eating meals at the same time every day helps prevent low blood sugar.  People who adjust the dose and timing of their insulin each day (called a "flexible regimen") do not always have to eat meals at the same time. That's because they can time their insulin dose for before they plan to eat, and also adjust the dose for how much they plan to eat.  People who take medicines that don't usually cause low blood sugar, such as metformin, don't have to eat meals at the same time every day.  What do I need to think about when planning what to eat? -- Our bodies break down the food we eat into small pieces called carbohydrates, proteins, and fats.  When planning what to eat, people with diabetes " "need to think about:  Carbohydrates (or "carbs") - Carbohydrates, which are sugars that our bodies use for energy, can raise a person's blood sugar level. Your doctor, nurse, or dietitian will tell you how many carbohydrates you should eat at each meal or snack. Foods that have carbohydrates include:  Bread, pasta, and rice  Vegetables and fruits  Dairy foods  Foods and drinks with added sugar  It is best to get your carbohydrates from fruits, vegetables, whole grains, and low-fat milk. It is best to avoid drinks with added sugar, like soda, juices, and sports drinks.   Protein - Your doctor, nurse, or dietitian will tell you how much protein you should eat each day. It is best to eat lean meats, fish, eggs, beans, peas, soy products, nuts, and seeds.  Fats - The type of fat you eat is more important than the amount of fat. "Saturated" and "trans" fats can increase your risk for heart problems, like a heart attack.  Foods that have saturated fats include meat, butter, cheese, and ice cream.  Foods that have trans fats include processed food with "partially hydrogenated oils" on the ingredient list. This may include fried foods, store bought cookies, muffins, pies, and cakes.  "Monounsaturated" and "polyunsaturated" fats are better for you. Foods with these types of fat include fish, avocado, olive oil, and nuts.  Calories - People need to eat a certain amount of calories each day to keep their weight the same. People who are overweight and want to lose weight need to eat fewer calories each day.  Fiber - Eating foods with a lot of fiber can help control a person's blood sugar level. Foods that have a lot of fiber include apples, green beans, peas, beans, lentils, nuts, oatmeal, and whole grains.  Salt - People who have high blood pressure should not eat foods that contain a lot of salt (also called sodium). People with high blood pressure should also eat healthy foods, such as fruits, vegetables, and low-fat dairy " foods.  Alcohol - Having more than 1 drink (for women) or 2 drinks (for men) a day can raise blood sugar levels. Also, drinks that have fruit juice or soda in them can raise blood sugar levels.  What can I do if I need to lose weight? -- If you need to lose weight, you can:  Exercise - Try to get at least 30 minutes of physical activity a day, most days of the week. Even gentle exercise, like walking, is good for your health. Some people with diabetes need to change their medicine dose before they exercise. They might also need to check their blood sugar levels before and after exercising.  Eat fewer calories - Your doctor, nurse, or dietitian can tell you how many calories you should eat each day in order to lose weight.  If you are worried about your weight, size, or shape, talk with your doctor, nurse, or dietitian. They can help you make changes to improve your health.  Can I eat the same foods as my family? -- Yes. You do not need to eat special foods if you have diabetes. You and your family can eat the same foods. Changing your diet is mostly about eating healthy foods and not eating too much.  What are the other parts of diabetes treatment? -- Besides changing your diet, the other parts of diabetes treatment are:  Exercise  Medicines  Some people with diabetes need to learn how to match their diet and exercise with their medicine dose. For example, people who use insulin might need to choose the dose of insulin they give themselves. To choose their dose, they need to think about:  What they plan to eat at the next meal  How much exercise they plan to do  What their blood sugar level is  If the diet and exercise do not match the medicine dose, a person's blood sugar level can get too low or too high. Blood sugar levels that are too low or too high can cause problems.  All topics are updated as new evidence becomes available and our peer review process is complete.  This topic retrieved from [employer] on: Sep  21, 2021.  Topic 84919 Version 7.0  Release: 29.4.2 - C29.263  © 2021 UpToDate, Inc. and/or its affiliates. All rights reserved.  Consumer Information Use and Disclaimer   This information is not specific medical advice and does not replace information you receive from your health care provider. This is only a brief summary of general information. It does NOT include all information about conditions, illnesses, injuries, tests, procedures, treatments, therapies, discharge instructions or life-style choices that may apply to you. You must talk with your health care provider for complete information about your health and treatment options. This information should not be used to decide whether or not to accept your health care provider's advice, instructions or recommendations. Only your health care provider has the knowledge and training to provide advice that is right for you. The use of this information is governed by the [employer] End User License Agreement, available at https://www.[employer]/en/solutions/Inaura/about/link.The use of [employer] content is governed by the [employer] Terms of Use. ©2021 UpToDate, Inc. All rights reserved.  Copyright   © 2021 UpToDate, Inc. and/or its affiliates. All rights reserved.

## 2022-07-12 PROCEDURE — 85730 THROMBOPLASTIN TIME PARTIAL: CPT | Performed by: NURSE PRACTITIONER

## 2022-07-18 ENCOUNTER — TELEPHONE (OUTPATIENT)
Dept: PRIMARY CARE CLINIC | Facility: CLINIC | Age: 52
End: 2022-07-18
Payer: COMMERCIAL

## 2022-07-18 NOTE — TELEPHONE ENCOUNTER
----- Message from Huey Mtz DNP, DEZP-C sent at 7/18/2022  4:34 PM CDT -----  Please notify patient of results; as patient if he has been taking his potassium. If he has been taking it, then he needs to increase potassium to 20 meq po twice a day for about 5 days, then resume 20 mg daily.

## 2022-08-02 ENCOUNTER — OFFICE VISIT (OUTPATIENT)
Dept: PRIMARY CARE CLINIC | Facility: CLINIC | Age: 52
End: 2022-08-02
Payer: COMMERCIAL

## 2022-08-02 VITALS
HEART RATE: 86 BPM | WEIGHT: 290 LBS | BODY MASS INDEX: 43.95 KG/M2 | RESPIRATION RATE: 18 BRPM | OXYGEN SATURATION: 97 % | SYSTOLIC BLOOD PRESSURE: 116 MMHG | DIASTOLIC BLOOD PRESSURE: 71 MMHG | TEMPERATURE: 99 F | HEIGHT: 68 IN

## 2022-08-02 DIAGNOSIS — L29.9 PRURITUS: ICD-10-CM

## 2022-08-02 DIAGNOSIS — E11.9 TYPE 2 DIABETES MELLITUS WITHOUT COMPLICATION, WITHOUT LONG-TERM CURRENT USE OF INSULIN: Primary | ICD-10-CM

## 2022-08-02 DIAGNOSIS — I10 ESSENTIAL HYPERTENSION: ICD-10-CM

## 2022-08-02 DIAGNOSIS — N52.9 ERECTILE DYSFUNCTION, UNSPECIFIED ERECTILE DYSFUNCTION TYPE: ICD-10-CM

## 2022-08-02 DIAGNOSIS — K21.9 GASTROESOPHAGEAL REFLUX DISEASE, UNSPECIFIED WHETHER ESOPHAGITIS PRESENT: ICD-10-CM

## 2022-08-02 DIAGNOSIS — L85.3 DRY SKIN: ICD-10-CM

## 2022-08-02 LAB — GLUCOSE SERPL-MCNC: 99 MG/DL (ref 70–110)

## 2022-08-02 PROCEDURE — 3008F BODY MASS INDEX DOCD: CPT | Mod: CPTII,,, | Performed by: NURSE PRACTITIONER

## 2022-08-02 PROCEDURE — 3078F DIAST BP <80 MM HG: CPT | Mod: CPTII,,, | Performed by: NURSE PRACTITIONER

## 2022-08-02 PROCEDURE — 3044F HG A1C LEVEL LT 7.0%: CPT | Mod: CPTII,,, | Performed by: NURSE PRACTITIONER

## 2022-08-02 PROCEDURE — 3078F PR MOST RECENT DIASTOLIC BLOOD PRESSURE < 80 MM HG: ICD-10-PCS | Mod: CPTII,,, | Performed by: NURSE PRACTITIONER

## 2022-08-02 PROCEDURE — 1159F MED LIST DOCD IN RCRD: CPT | Mod: CPTII,,, | Performed by: NURSE PRACTITIONER

## 2022-08-02 PROCEDURE — 3074F SYST BP LT 130 MM HG: CPT | Mod: CPTII,,, | Performed by: NURSE PRACTITIONER

## 2022-08-02 PROCEDURE — 3044F PR MOST RECENT HEMOGLOBIN A1C LEVEL <7.0%: ICD-10-PCS | Mod: CPTII,,, | Performed by: NURSE PRACTITIONER

## 2022-08-02 PROCEDURE — 3008F PR BODY MASS INDEX (BMI) DOCUMENTED: ICD-10-PCS | Mod: CPTII,,, | Performed by: NURSE PRACTITIONER

## 2022-08-02 PROCEDURE — 1160F RVW MEDS BY RX/DR IN RCRD: CPT | Mod: CPTII,,, | Performed by: NURSE PRACTITIONER

## 2022-08-02 PROCEDURE — 1160F PR REVIEW ALL MEDS BY PRESCRIBER/CLIN PHARMACIST DOCUMENTED: ICD-10-PCS | Mod: CPTII,,, | Performed by: NURSE PRACTITIONER

## 2022-08-02 PROCEDURE — 1159F PR MEDICATION LIST DOCUMENTED IN MEDICAL RECORD: ICD-10-PCS | Mod: CPTII,,, | Performed by: NURSE PRACTITIONER

## 2022-08-02 PROCEDURE — 99213 PR OFFICE/OUTPT VISIT, EST, LEVL III, 20-29 MIN: ICD-10-PCS | Mod: ,,, | Performed by: NURSE PRACTITIONER

## 2022-08-02 PROCEDURE — 3074F PR MOST RECENT SYSTOLIC BLOOD PRESSURE < 130 MM HG: ICD-10-PCS | Mod: CPTII,,, | Performed by: NURSE PRACTITIONER

## 2022-08-02 PROCEDURE — 99213 OFFICE O/P EST LOW 20 MIN: CPT | Mod: ,,, | Performed by: NURSE PRACTITIONER

## 2022-08-02 RX ORDER — HYDROGEN PEROXIDE 3 %
20 SOLUTION, NON-ORAL MISCELLANEOUS
Qty: 30 CAPSULE | Refills: 11 | Status: SHIPPED | OUTPATIENT
Start: 2022-08-02 | End: 2023-09-19 | Stop reason: SDUPTHER

## 2022-08-02 RX ORDER — CETIRIZINE HYDROCHLORIDE 10 MG/1
10 TABLET ORAL DAILY
Qty: 30 TABLET | Refills: 11 | Status: SHIPPED | OUTPATIENT
Start: 2022-08-02 | End: 2024-01-03

## 2022-08-02 RX ORDER — TADALAFIL 5 MG/1
TABLET ORAL
Qty: 10 TABLET | Refills: 1 | Status: SHIPPED | OUTPATIENT
Start: 2022-08-02 | End: 2024-01-03 | Stop reason: SDUPTHER

## 2022-08-02 NOTE — PROGRESS NOTES
[facility]  Primary Care       PATIENT NAME: Papo Bell   : 1970    AGE: 51 y.o. DATE: 2022    MRN: [id number]        Reason for Visit / Chief Complaint:  Gastroesophageal Reflux (Pt is concerned that his  Omeprazole is causing his  hands to peel  , and his feet are tingling.) and Diabetes (Fasting blood sugar 99)     Subjective:     HPI: Patient states he thinks the omeprazole is causing his hands to peel. States his feet has numbness and tingling to feet. Currently taking Gabapentin 100 mg by mouth three times a day. States his feet have been tingling for a couple of months and that's why the gabapentin was started. No skin breakdown to feet. Patient is requesting something for erectile dysfunction.          Review of Systems: Review of Systems   Constitutional: Negative for fever.   Respiratory: Negative for cough and shortness of breath.    Cardiovascular: Negative for chest pain.   Genitourinary: Negative for dysuria.   Musculoskeletal: Negative for gait problem.   Skin:        Hands peeling   Neurological: Positive for numbness. Negative for headaches.          Review of patient's allergies indicates:  No Known Allergies     Med List:  Current Outpatient Medications on File Prior to Visit   Medication Sig Dispense Refill    amLODIPine (NORVASC) 10 MG tablet TAKE 1 TABLET DAILY 90 tablet 1    atorvastatin (LIPITOR) 20 MG tablet TAKE 1 TABLET EVERY EVENING 90 tablet 1    ergocalciferol (ERGOCALCIFEROL) 50,000 unit Cap Take 1 capsule by mouth weekly for 12 weeks then take 1 capsule monthly after that 12 capsule 2    gabapentin (NEURONTIN) 100 MG capsule Take 1 capsule (100 mg total) by mouth 3 (three) times daily. 90 capsule 5    hydroCHLOROthiazide (MICROZIDE) 12.5 mg capsule       metFORMIN (GLUCOPHAGE) 500 MG tablet TAKE 1 TABLET DAILY WITH   BREAKFAST 90 tablet 1    omega 3-dha-epa-fish oil (FISH OIL) 1,000 mg (120 mg-180 mg) Cap Take 1 capsule by mouth once daily. 90  capsule 3    potassium chloride SA (K-DUR,KLOR-CON) 20 MEQ tablet Take 1 tablet (20 mEq total) by mouth once daily. 30 tablet 5    timolol maleate 0.5% (TIMOPTIC) 0.5 % Drop PUT 1 DROP INTO AFFECTED EYE(S) EVERY MORNING      [DISCONTINUED] acetaminophen-caff-dihydrocod 320.5-30-16 mg Cap       [DISCONTINUED] omeprazole (PRILOSEC) 40 MG capsule TAKE 1 CAPSULE EVERY       MORNING 90 capsule 1    [DISCONTINUED] oxyCODONE-acetaminophen (PERCOCET)  mg per tablet Take 1 tablet by mouth every 6 (six) hours as needed for Pain. 28 tablet 0    [DISCONTINUED] traMADoL (ULTRAM) 50 mg tablet TAKE 1-2 TABLETS BY MOUTH 3 TIMES A DAY FOR PAIN.       No current facility-administered medications on file prior to visit.       Medical/Social/Family History:  Past Medical History:   Diagnosis Date    BMI 45.0-49.9, adult 06/20/2018    Cervicalgia 04/07/2021    Diabetes mellitus, type 2     DJD (degenerative joint disease) 03/15/2019    Edema 01/27/2021    Essential hypertension 04/07/2021    GERD (gastroesophageal reflux disease) 01/13/2021    Hyperlipidemia     Hypertension     Low back pain 06/19/2019    Osteoporosis     Pain in right shoulder 04/07/2021    Tachycardia 04/07/2021      Social History     Tobacco Use   Smoking Status Never Smoker   Smokeless Tobacco Never Used      Social History     Substance and Sexual Activity   Alcohol Use Yes       Family History   Problem Relation Age of Onset    Diabetes Mother         AGE 78    Diabetes Father         AGE 54    Hypertension Father     No Known Problems Brother         AGE 55    No Known Problems Brother         AGE 49    Hypertension Brother         AGE 47    No Known Problems Daughter     No Known Problems Daughter     No Known Problems Daughter     No Known Problems Son     No Known Problems Son       Past Surgical History:   Procedure Laterality Date    CARPAL TUNNEL RELEASE Left 2/17/2022    Procedure: RELEASE, CARPAL TUNNEL;  Surgeon:  "Wilber Crocker MD;  Location: [facility] OR;  Service: Orthopedics;  Laterality: Left;    PATELLA TENDONS REPAIR Bilateral     RADIOFREQUENCY ABLATION Left 2/11/2022    Procedure: Left GSV RF Ablation;  Surgeon: Collins Canada DO;  Location: [facility] OR;  Service: Vascular;  Laterality: Left;    TORN MENISCUS Left     ULNAR NERVE TRANSPOSITION Left 2/17/2022    Procedure: TRANSPOSITION, NERVE, ULNAR;  Surgeon: Wilber Crocker MD;  Location: [facility] OR;  Service: Orthopedics;  Laterality: Left;    WRIST FRACTURE SURGERY Left         There is no immunization history on file for this patient.       Objective:      Vitals:    08/02/22 1045   BP: 116/71   BP Location: Left arm   Patient Position: Sitting   BP Method: Large (Automatic)   Pulse: 86   Resp: 18   Temp: 98.5 °F (36.9 °C)   TempSrc: Oral   SpO2: 97%   Weight: 131.5 kg (290 lb)   Height: 5' 8" (1.727 m)     Body mass index is 44.09 kg/m².     Physical Exam: Physical Exam  Constitutional:       Appearance: Normal appearance.   HENT:      Head: Normocephalic.      Mouth/Throat:      Mouth: Mucous membranes are moist.   Eyes:      Extraocular Movements: Extraocular movements intact.      Conjunctiva/sclera: Conjunctivae normal.      Pupils: Pupils are equal, round, and reactive to light.   Cardiovascular:      Rate and Rhythm: Normal rate and regular rhythm.   Pulmonary:      Effort: Pulmonary effort is normal. No respiratory distress.      Breath sounds: Normal breath sounds. No wheezing or rhonchi.   Musculoskeletal:         General: Normal range of motion.      Cervical back: Normal range of motion.   Skin:     General: Skin is warm and dry.      Comments: Patient has dry skin to hands and feet   Neurological:      General: No focal deficit present.      Mental Status: He is alert and oriented to person, place, and time.      Gait: Gait normal.   Psychiatric:         Mood and Affect: Mood normal.         Behavior: Behavior normal.      "       Protective Sensation (w/ 10 gram monofilament):  Right: Intact  Left: Intact    Visual Inspection:  Nails Intact - without Evidence of Foot Deformity- Bilateral and Dry Skin -  Bilateral    Pedal Pulses:   Right: Present  Left: Present    Posterior tibialis:   Right:Present  Left: Present        Assessment:          ICD-10-CM ICD-9-CM   1. Type 2 diabetes mellitus without complication, without long-term current use of insulin  E11.9 250.00   2. Essential hypertension  I10 401.9   3. Gastroesophageal reflux disease, unspecified whether esophagitis present  K21.9 530.81   4. Pruritus  L29.9 698.9   5. Erectile dysfunction, unspecified erectile dysfunction type  N52.9 607.84   6. Dry skin  L85.3 701.1        Plan:       Type 2 diabetes mellitus without complication, without long-term current use of insulin  -     POCT glucose  -     esomeprazole (NEXIUM) 20 MG capsule; Take 1 capsule (20 mg total) by mouth before breakfast.  Dispense: 30 capsule; Refill: 11    Essential hypertension  -     esomeprazole (NEXIUM) 20 MG capsule; Take 1 capsule (20 mg total) by mouth before breakfast.  Dispense: 30 capsule; Refill: 11    Gastroesophageal reflux disease, unspecified whether esophagitis present  -     esomeprazole (NEXIUM) 20 MG capsule; Take 1 capsule (20 mg total) by mouth before breakfast.  Dispense: 30 capsule; Refill: 11    Pruritus  -     cetirizine (ZYRTEC) 10 MG tablet; Take 1 tablet (10 mg total) by mouth once daily.  Dispense: 30 tablet; Refill: 11    Erectile dysfunction, unspecified erectile dysfunction type  -     tadalafiL (CIALIS) 5 MG tablet; Take one tablet prn before sexual activity; effects may last 36 hours. Max dose is 20 mg/dose up to 1 dose/24 hours.  Dispense: 10 tablet; Refill: 1    Dry skin  -     mineral oil-hydrophil petrolat (AQUAPHOR) Oint; Apply topically as needed (dry skin).  Dispense: 396 g; Refill: 1          New & refilled meds:  Requested Prescriptions     Signed Prescriptions Disp  Refills    esomeprazole (NEXIUM) 20 MG capsule 30 capsule 11     Sig: Take 1 capsule (20 mg total) by mouth before breakfast.    cetirizine (ZYRTEC) 10 MG tablet 30 tablet 11     Sig: Take 1 tablet (10 mg total) by mouth once daily.    tadalafiL (CIALIS) 5 MG tablet 10 tablet 1     Sig: Take one tablet prn before sexual activity; effects may last 36 hours. Max dose is 20 mg/dose up to 1 dose/24 hours.    mineral oil-hydrophil petrolat (AQUAPHOR) Oint 396 g 1     Sig: Apply topically as needed (dry skin).       Follow up if symptoms worsen or fail to improve.     Patient Instructions   Patient Education       Controlling Your Blood Pressure Through Lifestyle   The Basics   Written by the doctors and editors at [facility]   What does my lifestyle have to do with my blood pressure? -- The things you do and the foods you eat have a big effect on your blood pressure and your overall health. Following the right lifestyle can:  · Lower your blood pressure or keep you from getting high blood pressure in the first place  · Reduce your need for blood pressure medicines  · Make medicines for high blood pressure work better, if you do take them  · Lower the chances that you'll have a heart attack or stroke, or develop kidney disease  Which lifestyle choices will help lower my blood pressure? -- Here's what you can do:  · Lose weight (if you are overweight)  · Choose a diet rich in fruits, vegetables, and low-fat dairy products, and low in meats, sweets, and refined grains  · Eat less salt (sodium)  · Do something active for at least 30 minutes a day on most days of the week  · Limit the amount of alcohol you drink  If you have high blood pressure, it's also very important to quit smoking (if you smoke). Quitting smoking might not bring your blood pressure down. But it will lower the chances that you'll have a heart attack or stroke, and it will help you feel better and live longer.  Start low and go slow -- The changes listed  "above might sound like a lot, but don't worry. You don't have to change everything all at once. The key to improving your lifestyle is to "start low and go slow." Choose 1 small, specific thing to change and try doing it for a while. If it works for you, keep doing it until it becomes a habit. If it doesn't, don't give up. Choose something else to change and see how that goes.  Let's say, for example, that you would like to improve your diet. If you're the type of person who eats cheeseburgers and French fries all the time, you can't switch to eating just salads from one day to the next. When people try to make changes like that, they often fail. Then they feel frustrated and tend to give up. So instead of trying to change everything about your diet in 1 day, change 1 or 2 small things about your diet and give yourself time to get used to those changes. For instance, keep the cheeseburger but give up the French fries. Or eat the same things but cut your portions in half.  As you find things that you are able to change and stick with, keep adding new changes. In time, you will see that you can actually change a lot. You just have to get used to the changes slowly.  Lose weight -- When people think about losing weight, they sometimes make it more complicated than it really is. To lose weight, you have to either eat less or move more. If you do both of those things, it's even better. But there is no single weight-loss diet or activity that's better than any other. When it comes to weight loss, the most effective plan is the one that you'll stick with.  Improve your diet -- There is no single diet that is right for everyone. But in general, a healthy diet can include:  · Lots of fruits, vegetables, and whole grains  · Some beans, peas, lentils, chickpeas, and similar foods  · Some nuts, such as walnuts, almonds, and peanuts  · Fat-free or low-fat milk and milk products  · Some fish  To have a healthy diet, it's also " "important to limit or avoid sugar, sweets, meats, and refined grains. (Refined grains are found in white bread, white rice, most forms of pasta, and most packaged "snack" foods.)  Reduce salt -- Many people think that eating a low-sodium diet means avoiding the salt shaker and not adding salt when cooking. The truth is, not adding salt at the table or when you cook will only help a little. Almost all of the sodium you eat is already in the food you buy at the grocery store or at restaurants (figure 1).  The most important thing you can do to cut down on sodium is to eat less processed food. That means that you should avoid most foods that are sold in cans, boxes, jars, and bags. You should also eat in restaurants less often.  To reduce the amount of sodium you get, buy fresh or fresh-frozen fruits, vegetables, and meats. (Fresh-frozen foods have had nothing added to them before freezing.) Then you can make meals at home, from scratch, with these ingredients.  As with the other changes, don't try to cut out salt all at once. Instead, choose 1 or 2 foods that have a lot of sodium and try to replace them with low-sodium choices. When you get used to those low-sodium options, find another food or 2 to change. Then keep going, until all the foods you eat are sodium-free or low in sodium.  Become more active -- If you want to be more active, you don't have to go to the gym or get all sweaty. It is possible to increase your activity level while doing everyday things you enjoy. Walking, gardening, and dancing are just a few of the things that you might try. As with all the other changes, the key is not to do too much too fast. If you don't do any activity now, start by walking for just a few minutes every other day. Do that for a few weeks. If you stick with it, try doing it for longer. But if you find that you don't like walking, try a different activity.  Drink less alcohol -- If you are a woman, do not have more than 1 " ""standard drink" of alcohol a day. If you are a man, do not have more than 2. A "standard drink" is:  · A can or bottle that has 12 ounces of beer  · A glass that has 5 ounces of wine  · A shot that has 1.5 ounces of whiskey  Where should I start? -- If you want to improve your lifestyle, start by making the changes that you think would be easiest for you. If you used to exercise and just got out of the habit, maybe it would be easy for you to start exercising again. Or if you actually like cooking meals from scratch, maybe the first thing you should focus on is eating home-cooked meals that are low in sodium.  Whatever you tackle first, choose specific, realistic goals, and give yourself a deadline. For example, do not decide that you are going to "exercise more." Instead, decide that you are going to walk for 10 minutes on Monday, Wednesday, and Friday, and that you are going to do this for the next 2 weeks.  When lifestyle changes are too general, people have a hard time following through.  Now go. You can do it!  All topics are updated as new evidence becomes available and our peer review process is complete.  This topic retrieved from [employer] on: Sep 21, 2021.  Topic 06636 Version 8.0  Release: 29.4.2 - C29.263  © 2021 UpToDate, Inc. and/or its affiliates. All rights reserved.  figure 1: Sources of sodium in your diet     Graphic 17950 Version 2.0    Consumer Information Use and Disclaimer   This information is not specific medical advice and does not replace information you receive from your health care provider. This is only a brief summary of general information. It does NOT include all information about conditions, illnesses, injuries, tests, procedures, treatments, therapies, discharge instructions or life-style choices that may apply to you. You must talk with your health care provider for complete information about your health and treatment options. This information should not be used to decide whether " "or not to accept your health care provider's advice, instructions or recommendations. Only your health care provider has the knowledge and training to provide advice that is right for you. The use of this information is governed by the [employer] End User License Agreement, available at https://www.[employer]/en/solutions/CloudX/about/link.The use of [employer] content is governed by the [employer] Terms of Use. ©2021 UpToDate, Inc. All rights reserved.  Copyright   © 2021 UpToDate, Inc. and/or its affiliates. All rights reserved.  Patient Education       Diabetes and Diet   The Basics   Written by the doctors and editors at [employer] [facility]   Why is diet important in diabetes? -- Diet is important because it is part of diabetes treatment. Many people need to change what they eat and how much they eat to help treat their diabetes. It is important for people to treat their diabetes so that they:  · Keep their blood sugar at or near a normal level  · Prevent long-term problems, such as heart or kidney problems, that can happen in people with diabetes  Changing your diet can also help treat obesity, high blood pressure, and high cholesterol. These conditions can affect people with diabetes and can lead to future problems, such as heart attacks or strokes.  Who will work with me to change my diet? -- Your doctor or nurse will work with you to make a food plan to change your diet. They might also recommend that you work with a "dietitian." A dietitian is an expert on food and eating.  Do I need to eat at the same times every day? -- When and how often you should eat depends, in part, on the diabetes medicines you take. For example:  · People who take about the same amount of insulin at the same time each day (called a "fixed regimen") should eat meals at the same times. This is also true for people who take pills that increase insulin levels, such as sulfonylureas. Eating meals at the same time every day helps prevent low " "blood sugar.  · People who adjust the dose and timing of their insulin each day (called a "flexible regimen") do not always have to eat meals at the same time. That's because they can time their insulin dose for before they plan to eat, and also adjust the dose for how much they plan to eat.  · People who take medicines that don't usually cause low blood sugar, such as metformin, don't have to eat meals at the same time every day.  What do I need to think about when planning what to eat? -- Our bodies break down the food we eat into small pieces called carbohydrates, proteins, and fats.  When planning what to eat, people with diabetes need to think about:  1. Carbohydrates (or "carbs") - Carbohydrates, which are sugars that our bodies use for energy, can raise a person's blood sugar level. Your doctor, nurse, or dietitian will tell you how many carbohydrates you should eat at each meal or snack. Foods that have carbohydrates include:  ? Bread, pasta, and rice  ? Vegetables and fruits  ? Dairy foods  ? Foods and drinks with added sugar  It is best to get your carbohydrates from fruits, vegetables, whole grains, and low-fat milk. It is best to avoid drinks with added sugar, like soda, juices, and sports drinks.   1. Protein - Your doctor, nurse, or dietitian will tell you how much protein you should eat each day. It is best to eat lean meats, fish, eggs, beans, peas, soy products, nuts, and seeds.  2. Fats - The type of fat you eat is more important than the amount of fat. "Saturated" and "trans" fats can increase your risk for heart problems, like a heart attack.  ? Foods that have saturated fats include meat, butter, cheese, and ice cream.  ? Foods that have trans fats include processed food with "partially hydrogenated oils" on the ingredient list. This may include fried foods, store bought cookies, muffins, pies, and cakes.  "Monounsaturated" and "polyunsaturated" fats are better for you. Foods with these types of " fat include fish, avocado, olive oil, and nuts.  · Calories - People need to eat a certain amount of calories each day to keep their weight the same. People who are overweight and want to lose weight need to eat fewer calories each day.  · Fiber - Eating foods with a lot of fiber can help control a person's blood sugar level. Foods that have a lot of fiber include apples, green beans, peas, beans, lentils, nuts, oatmeal, and whole grains.  · Salt - People who have high blood pressure should not eat foods that contain a lot of salt (also called sodium). People with high blood pressure should also eat healthy foods, such as fruits, vegetables, and low-fat dairy foods.  · Alcohol - Having more than 1 drink (for women) or 2 drinks (for men) a day can raise blood sugar levels. Also, drinks that have fruit juice or soda in them can raise blood sugar levels.  What can I do if I need to lose weight? -- If you need to lose weight, you can:  · Exercise - Try to get at least 30 minutes of physical activity a day, most days of the week. Even gentle exercise, like walking, is good for your health. Some people with diabetes need to change their medicine dose before they exercise. They might also need to check their blood sugar levels before and after exercising.  · Eat fewer calories - Your doctor, nurse, or dietitian can tell you how many calories you should eat each day in order to lose weight.  If you are worried about your weight, size, or shape, talk with your doctor, nurse, or dietitian. They can help you make changes to improve your health.  Can I eat the same foods as my family? -- Yes. You do not need to eat special foods if you have diabetes. You and your family can eat the same foods. Changing your diet is mostly about eating healthy foods and not eating too much.  What are the other parts of diabetes treatment? -- Besides changing your diet, the other parts of diabetes treatment are:  · Exercise  · Medicines  Some  people with diabetes need to learn how to match their diet and exercise with their medicine dose. For example, people who use insulin might need to choose the dose of insulin they give themselves. To choose their dose, they need to think about:  · What they plan to eat at the next meal  · How much exercise they plan to do  · What their blood sugar level is  If the diet and exercise do not match the medicine dose, a person's blood sugar level can get too low or too high. Blood sugar levels that are too low or too high can cause problems.  All topics are updated as new evidence becomes available and our peer review process is complete.  This topic retrieved from [employer] on: Sep 21, 2021.  Topic 90029 Version 7.0  Release: 29.4.2 - C29.263  © 2021 UpToDate, Inc. and/or its affiliates. All rights reserved.  Consumer Information Use and Disclaimer   This information is not specific medical advice and does not replace information you receive from your health care provider. This is only a brief summary of general information. It does NOT include all information about conditions, illnesses, injuries, tests, procedures, treatments, therapies, discharge instructions or life-style choices that may apply to you. You must talk with your health care provider for complete information about your health and treatment options. This information should not be used to decide whether or not to accept your health care provider's advice, instructions or recommendations. Only your health care provider has the knowledge and training to provide advice that is right for you. The use of this information is governed by the [employer] End User License Agreement, available at https://www.[employer].ly/en/solutions/[employer]/about/link.The use of [employer] content is governed by the [employer] Terms of Use. ©2021 UpToDate, Inc. All rights reserved.  Copyright   © 2021 UpToDate, Inc. and/or its affiliates. All rights reserved.           Signature:  Huey Mtz, WENDY, FNP-C

## 2022-08-02 NOTE — PATIENT INSTRUCTIONS
"Patient Education       Controlling Your Blood Pressure Through Lifestyle   The Basics   Written by the doctors and editors at [facility]   What does my lifestyle have to do with my blood pressure? -- The things you do and the foods you eat have a big effect on your blood pressure and your overall health. Following the right lifestyle can:  Lower your blood pressure or keep you from getting high blood pressure in the first place  Reduce your need for blood pressure medicines  Make medicines for high blood pressure work better, if you do take them  Lower the chances that you'll have a heart attack or stroke, or develop kidney disease  Which lifestyle choices will help lower my blood pressure? -- Here's what you can do:  Lose weight (if you are overweight)  Choose a diet rich in fruits, vegetables, and low-fat dairy products, and low in meats, sweets, and refined grains  Eat less salt (sodium)  Do something active for at least 30 minutes a day on most days of the week  Limit the amount of alcohol you drink  If you have high blood pressure, it's also very important to quit smoking (if you smoke). Quitting smoking might not bring your blood pressure down. But it will lower the chances that you'll have a heart attack or stroke, and it will help you feel better and live longer.  Start low and go slow -- The changes listed above might sound like a lot, but don't worry. You don't have to change everything all at once. The key to improving your lifestyle is to "start low and go slow." Choose 1 small, specific thing to change and try doing it for a while. If it works for you, keep doing it until it becomes a habit. If it doesn't, don't give up. Choose something else to change and see how that goes.  Let's say, for example, that you would like to improve your diet. If you're the type of person who eats cheeseburgers and French fries all the time, you can't switch to eating just salads from one day to the next. When people try to " "make changes like that, they often fail. Then they feel frustrated and tend to give up. So instead of trying to change everything about your diet in 1 day, change 1 or 2 small things about your diet and give yourself time to get used to those changes. For instance, keep the cheeseburger but give up the French fries. Or eat the same things but cut your portions in half.  As you find things that you are able to change and stick with, keep adding new changes. In time, you will see that you can actually change a lot. You just have to get used to the changes slowly.  Lose weight -- When people think about losing weight, they sometimes make it more complicated than it really is. To lose weight, you have to either eat less or move more. If you do both of those things, it's even better. But there is no single weight-loss diet or activity that's better than any other. When it comes to weight loss, the most effective plan is the one that you'll stick with.  Improve your diet -- There is no single diet that is right for everyone. But in general, a healthy diet can include:  Lots of fruits, vegetables, and whole grains  Some beans, peas, lentils, chickpeas, and similar foods  Some nuts, such as walnuts, almonds, and peanuts  Fat-free or low-fat milk and milk products  Some fish  To have a healthy diet, it's also important to limit or avoid sugar, sweets, meats, and refined grains. (Refined grains are found in white bread, white rice, most forms of pasta, and most packaged "snack" foods.)  Reduce salt -- Many people think that eating a low-sodium diet means avoiding the salt shaker and not adding salt when cooking. The truth is, not adding salt at the table or when you cook will only help a little. Almost all of the sodium you eat is already in the food you buy at the grocery store or at restaurants (figure 1).  The most important thing you can do to cut down on sodium is to eat less processed food. That means that you should " "avoid most foods that are sold in cans, boxes, jars, and bags. You should also eat in restaurants less often.  To reduce the amount of sodium you get, buy fresh or fresh-frozen fruits, vegetables, and meats. (Fresh-frozen foods have had nothing added to them before freezing.) Then you can make meals at home, from scratch, with these ingredients.  As with the other changes, don't try to cut out salt all at once. Instead, choose 1 or 2 foods that have a lot of sodium and try to replace them with low-sodium choices. When you get used to those low-sodium options, find another food or 2 to change. Then keep going, until all the foods you eat are sodium-free or low in sodium.  Become more active -- If you want to be more active, you don't have to go to the gym or get all sweaty. It is possible to increase your activity level while doing everyday things you enjoy. Walking, gardening, and dancing are just a few of the things that you might try. As with all the other changes, the key is not to do too much too fast. If you don't do any activity now, start by walking for just a few minutes every other day. Do that for a few weeks. If you stick with it, try doing it for longer. But if you find that you don't like walking, try a different activity.  Drink less alcohol -- If you are a woman, do not have more than 1 "standard drink" of alcohol a day. If you are a man, do not have more than 2. A "standard drink" is:  A can or bottle that has 12 ounces of beer  A glass that has 5 ounces of wine  A shot that has 1.5 ounces of whiskey  Where should I start? -- If you want to improve your lifestyle, start by making the changes that you think would be easiest for you. If you used to exercise and just got out of the habit, maybe it would be easy for you to start exercising again. Or if you actually like cooking meals from scratch, maybe the first thing you should focus on is eating home-cooked meals that are low in sodium.  Whatever you " "tackle first, choose specific, realistic goals, and give yourself a deadline. For example, do not decide that you are going to "exercise more." Instead, decide that you are going to walk for 10 minutes on Monday, Wednesday, and Friday, and that you are going to do this for the next 2 weeks.  When lifestyle changes are too general, people have a hard time following through.  Now go. You can do it!  All topics are updated as new evidence becomes available and our peer review process is complete.  This topic retrieved from [employer] on: Sep 21, 2021.  Topic 39624 Version 8.0  Release: 29.4.2 - C29.263  © 2021 UpToDate, Inc. and/or its affiliates. All rights reserved.  figure 1: Sources of sodium in your diet     Graphic 32015 Version 2.0    Consumer Information Use and Disclaimer   This information is not specific medical advice and does not replace information you receive from your health care provider. This is only a brief summary of general information. It does NOT include all information about conditions, illnesses, injuries, tests, procedures, treatments, therapies, discharge instructions or life-style choices that may apply to you. You must talk with your health care provider for complete information about your health and treatment options. This information should not be used to decide whether or not to accept your health care provider's advice, instructions or recommendations. Only your health care provider has the knowledge and training to provide advice that is right for you. The use of this information is governed by the [employer] End User License Agreement, available at https://www.[email or web address]/en/solutions/[employer]/about/link.The use of [employer] content is governed by the [employer] Terms of Use. ©2021 UpToDate, Inc. All rights reserved.  Copyright   © 2021 UpToDate, Inc. and/or its affiliates. All rights reserved.  Patient Education       Diabetes and Diet   The Basics   Written by the doctors and editors " "at [employer]   Why is diet important in diabetes? -- Diet is important because it is part of diabetes treatment. Many people need to change what they eat and how much they eat to help treat their diabetes. It is important for people to treat their diabetes so that they:  Keep their blood sugar at or near a normal level  Prevent long-term problems, such as heart or kidney problems, that can happen in people with diabetes  Changing your diet can also help treat obesity, high blood pressure, and high cholesterol. These conditions can affect people with diabetes and can lead to future problems, such as heart attacks or strokes.  Who will work with me to change my diet? -- Your doctor or nurse will work with you to make a food plan to change your diet. They might also recommend that you work with a "dietitian." A dietitian is an expert on food and eating.  Do I need to eat at the same times every day? -- When and how often you should eat depends, in part, on the diabetes medicines you take. For example:  People who take about the same amount of insulin at the same time each day (called a "fixed regimen") should eat meals at the same times. This is also true for people who take pills that increase insulin levels, such as sulfonylureas. Eating meals at the same time every day helps prevent low blood sugar.  People who adjust the dose and timing of their insulin each day (called a "flexible regimen") do not always have to eat meals at the same time. That's because they can time their insulin dose for before they plan to eat, and also adjust the dose for how much they plan to eat.  People who take medicines that don't usually cause low blood sugar, such as metformin, don't have to eat meals at the same time every day.  What do I need to think about when planning what to eat? -- Our bodies break down the food we eat into small pieces called carbohydrates, proteins, and fats.  When planning what to eat, people with diabetes " "need to think about:  Carbohydrates (or "carbs") - Carbohydrates, which are sugars that our bodies use for energy, can raise a person's blood sugar level. Your doctor, nurse, or dietitian will tell you how many carbohydrates you should eat at each meal or snack. Foods that have carbohydrates include:  Bread, pasta, and rice  Vegetables and fruits  Dairy foods  Foods and drinks with added sugar  It is best to get your carbohydrates from fruits, vegetables, whole grains, and low-fat milk. It is best to avoid drinks with added sugar, like soda, juices, and sports drinks.   Protein - Your doctor, nurse, or dietitian will tell you how much protein you should eat each day. It is best to eat lean meats, fish, eggs, beans, peas, soy products, nuts, and seeds.  Fats - The type of fat you eat is more important than the amount of fat. "Saturated" and "trans" fats can increase your risk for heart problems, like a heart attack.  Foods that have saturated fats include meat, butter, cheese, and ice cream.  Foods that have trans fats include processed food with "partially hydrogenated oils" on the ingredient list. This may include fried foods, store bought cookies, muffins, pies, and cakes.  "Monounsaturated" and "polyunsaturated" fats are better for you. Foods with these types of fat include fish, avocado, olive oil, and nuts.  Calories - People need to eat a certain amount of calories each day to keep their weight the same. People who are overweight and want to lose weight need to eat fewer calories each day.  Fiber - Eating foods with a lot of fiber can help control a person's blood sugar level. Foods that have a lot of fiber include apples, green beans, peas, beans, lentils, nuts, oatmeal, and whole grains.  Salt - People who have high blood pressure should not eat foods that contain a lot of salt (also called sodium). People with high blood pressure should also eat healthy foods, such as fruits, vegetables, and low-fat dairy " foods.  Alcohol - Having more than 1 drink (for women) or 2 drinks (for men) a day can raise blood sugar levels. Also, drinks that have fruit juice or soda in them can raise blood sugar levels.  What can I do if I need to lose weight? -- If you need to lose weight, you can:  Exercise - Try to get at least 30 minutes of physical activity a day, most days of the week. Even gentle exercise, like walking, is good for your health. Some people with diabetes need to change their medicine dose before they exercise. They might also need to check their blood sugar levels before and after exercising.  Eat fewer calories - Your doctor, nurse, or dietitian can tell you how many calories you should eat each day in order to lose weight.  If you are worried about your weight, size, or shape, talk with your doctor, nurse, or dietitian. They can help you make changes to improve your health.  Can I eat the same foods as my family? -- Yes. You do not need to eat special foods if you have diabetes. You and your family can eat the same foods. Changing your diet is mostly about eating healthy foods and not eating too much.  What are the other parts of diabetes treatment? -- Besides changing your diet, the other parts of diabetes treatment are:  Exercise  Medicines  Some people with diabetes need to learn how to match their diet and exercise with their medicine dose. For example, people who use insulin might need to choose the dose of insulin they give themselves. To choose their dose, they need to think about:  What they plan to eat at the next meal  How much exercise they plan to do  What their blood sugar level is  If the diet and exercise do not match the medicine dose, a person's blood sugar level can get too low or too high. Blood sugar levels that are too low or too high can cause problems.  All topics are updated as new evidence becomes available and our peer review process is complete.  This topic retrieved from [employer] on: Sep  21, 2021.  Topic 59064 Version 7.0  Release: 29.4.2 - C29.263  © 2021 UpToDate, Inc. and/or its affiliates. All rights reserved.  Consumer Information Use and Disclaimer   This information is not specific medical advice and does not replace information you receive from your health care provider. This is only a brief summary of general information. It does NOT include all information about conditions, illnesses, injuries, tests, procedures, treatments, therapies, discharge instructions or life-style choices that may apply to you. You must talk with your health care provider for complete information about your health and treatment options. This information should not be used to decide whether or not to accept your health care provider's advice, instructions or recommendations. Only your health care provider has the knowledge and training to provide advice that is right for you. The use of this information is governed by the [employer] End User License Agreement, available at https://www.[employer]/en/solutions/[employer]/about/link.The use of [employer] content is governed by the [employer] Terms of Use. ©2021 UpToDate, Inc. All rights reserved.  Copyright   © 2021 UpToDate, Inc. and/or its affiliates. All rights reserved.

## 2022-09-07 DIAGNOSIS — M17.11 OSTEOARTHRITIS OF RIGHT KNEE: Primary | ICD-10-CM

## 2022-09-07 DIAGNOSIS — Z96.651 S/P TOTAL KNEE REPLACEMENT, RIGHT: ICD-10-CM

## 2022-09-12 ENCOUNTER — CLINICAL SUPPORT (OUTPATIENT)
Dept: REHABILITATION | Facility: HOSPITAL | Age: 52
End: 2022-09-12
Payer: COMMERCIAL

## 2022-09-12 DIAGNOSIS — Z96.651 S/P TOTAL KNEE REPLACEMENT, RIGHT: ICD-10-CM

## 2022-09-12 DIAGNOSIS — M17.11 OSTEOARTHRITIS OF RIGHT KNEE: ICD-10-CM

## 2022-09-12 PROCEDURE — 97110 THERAPEUTIC EXERCISES: CPT

## 2022-09-12 PROCEDURE — 97016 VASOPNEUMATIC DEVICE THERAPY: CPT

## 2022-09-12 PROCEDURE — 97161 PT EVAL LOW COMPLEX 20 MIN: CPT

## 2022-09-12 NOTE — PLAN OF CARE
[facility]  Physical Therapy Initial Evaluation    Name: Papo Bell  Clinic Number: [id number]    Therapy Diagnosis: No diagnosis found.  Physician: Collins Stahl MD    Physician Orders: PT Eval and Treat   Medical Diagnosis from Referral: R TKA   Evaluation Date: 9/12/2022  Authorization Period Expiration: 9/12/2023  Plan of Care Expiration: 10/14/2022  Visit # / Visits authorized: 1/ 12    Time In: 8:08  Time Out: 9:12  Total Appointment Time (timed & untimed codes): 64 minutes    Precautions: Standard    Subjective   Date of onset: 8/19/2022  History of current condition - Papo reports: R TKA on 8/19/2022. Patient completed home health PT following surgery. Patient reports that he continues to have R LE weakness which limits his activity tolerance and mobility at this time. MD follow-up 10/9/2022     Medical History:   Past Medical History:   Diagnosis Date    BMI 45.0-49.9, adult 06/20/2018    Cervicalgia 04/07/2021    Diabetes mellitus, type 2     DJD (degenerative joint disease) 03/15/2019    Edema 01/27/2021    Essential hypertension 04/07/2021    GERD (gastroesophageal reflux disease) 01/13/2021    Hyperlipidemia     Hypertension     Low back pain 06/19/2019    Osteoporosis     Pain in right shoulder 04/07/2021    Tachycardia 04/07/2021       Surgical History:   Papo Bell  has a past surgical history that includes PATELLA TENDONS REPAIR (Bilateral); TORN MENISCUS (Left); Wrist fracture surgery (Left); Radiofrequency ablation (Left, 2/11/2022); Carpal tunnel release (Left, 2/17/2022); and Ulnar nerve transposition (Left, 2/17/2022).    Medications:   Papo has a current medication list which includes the following prescription(s): amlodipine, atorvastatin, cetirizine, ergocalciferol, esomeprazole, gabapentin, hydrochlorothiazide, metformin, mineral oil-hydrophil petrolat, omega 3-dha-epa-fish oil, potassium chloride sa, tadalafil, and timolol maleate 0.5%.    Allergies:   Review of  "patient's allergies indicates:  No Known Allergies     Imaging, X-rays     Prior Therapy: Home health following surgery   Social History:  lives with their family  Occupation: works at local [employer]   Prior Level of Function: independent   Current Level of Function: modified independent with RW     Pain:  Current 3/10, worst 7/10, best 3/10   Location: right knee   Description: Aching and Sharp  Aggravating Factors: prolonged positioning   Easing Factors: pain medication and ice    Pts goals: "be able to go back to work."    Objective     Incisions: Patient's incision appears to be healing well. No signs of drainage or infection.  Girth Measurements: Right 52 cm at midpatella Left 48 cm at midpatella    Range of motion:  Motion Right Left    Hip flexion  WITHIN FUNCTIONAL LIMITS  WITHIN FUNCTIONAL LIMITS   Hip extension  WITHIN FUNCTIONAL LIMITS  WITHIN FUNCTIONAL LIMITS   Hip abduction  WITHIN FUNCTIONAL LIMITS  WITHIN FUNCTIONAL LIMITS   Hip adduction  WITHIN FUNCTIONAL LIMITS  WITHIN FUNCTIONAL LIMITS   Knee extension  2 degrees from zero  WITHIN FUNCTIONAL LIMITS   Knee flexion  120 degrees   WITHIN FUNCTIONAL LIMITS       Manual muscle test   Muscle Right  Left    Hip flexion  MMT strength: 3+/5  MMT strength: 4/5   Hip extension  MMT strength: 3+/5  MMT strength: 4/5   Hip abduction  MMT strength: 3+/5  MMT strength: 4/5   Hip adduction  MMT strength: 3+/5  MMT strength: 4/5   Knee extension  MMT strength: 3/5  MMT strength: 4/5   Knee flexion  MMT strength: 3/5  MMT strength: 4/5       Gait:  Weight bearing precautions: WBAT  Assistive device: rolling walker  Ambulation deviations: decreased R heel strike, decreased R stance time, decreased R knee extension during stance,   Gait speed: 2.6 feet per second.    Clinical Special Tests:  5 x sit<>stand: 19 seconds with no UE use.    TREATMENT   Treatment Time In: 8:19  Treatment Time Out: 9:12  Total Treatment time (time-based codes) separate from Evaluation: " "53 minutes    Papo received the treatments listed below:  THERAPEUTIC EXERCISES to develop strength, endurance, ROM, and flexibility for 43 minutes including :See flowsheet below     Ther-Ex  Reps        NuStep  8 minutes    Wedge  2 minutes    HS stretch  3 x 30"    Heel slides (seated)  30 x    LAQs  20 x 3"    HS curls  20 x green TB    SAQs  20 x 3"    Heel Slides  30 x (120 degrees)    Quad Sets  30 x 3"    SLR  1 x 10 with min A          SUPERVISED MODALITIES after being cleared for contraindications:  Vasopneumatic device applied to R knee with elevation above heart level. Moderate pressure for 10 minutes.     Home Exercises and Patient Education Provided                Education provided:   - use of ice and elevation for management of edema and post exercise irritation     Written Home Exercises Provided: yes.  Exercises were reviewed and Papo was able to demonstrate them prior to the end of the session.  Papo demonstrated good  understanding of the education provided.     See EMR under Patient Instructions for exercises provided 9/12/2022.    Assessment   Papo is a 51 y.o. male referred to outpatient Physical Therapy with a medical diagnosis of R TKA. Pt presents with decreased R LE strength, R knee pain, and abnormal movement mechanics. Patient's impairments have limited his overall activity tolerance for ADLs and mobility tasks at this time.     Pt prognosis is Good.   Pt will benefit from skilled outpatient Physical Therapy to address the deficits stated above and in the chart below, provide pt/family education, and to maximize pt's level of independence.     PT provided patient with proper setup on NuStep to increase R knee ROM prior to completion of exercises. PT also provided patient with visual, verbal, and tactile cueing throughout treatment session for R LE alignment, form, speed, and hold times. Patient had no reports of increased pain or adverse effects to treatment.     Plan of care " discussed with patient: Yes  Pt's spiritual, cultural and educational needs considered and patient is agreeable to the plan of care and goals as stated below:     Anticipated Barriers for therapy: compliance with HEP       Goals: (to be met by end of POC)   Pt will increase R knee flexion to R, knee extension to 0 degrees, and quad lag to 0 degrees to allow patient normal gait pattern.  Pt will increase R knee strength to 4/5 to allow patient to safely return to prior level of function   Pt will report decrease in pain to 2/10 to improve quality of life.  Patient will complete 5 x sit<> less than or equal to 12 seconds to demonstrate improved B LE power.   Patient will independently ambulate community distances with a gait speed of greater than or equal to 3.25 feet per second.       Plan   Plan of care Certification: 9/12/2022 to 10/14/2022.    Outpatient Physical Therapy 3 times weekly for 4 weeks to include the following interventions: Electrical Stimulation: unattended, Gait Training, Manual Therapy, Moist Heat/ Ice, Patient Education, Therapeutic Activities, Therapeutic Exercise, and vasopneumatic device.     Nia Bell, PT, DPT     Physician Signature : ____________________________________________________  Date:_______________________________________________

## 2022-09-14 ENCOUNTER — CLINICAL SUPPORT (OUTPATIENT)
Dept: REHABILITATION | Facility: HOSPITAL | Age: 52
End: 2022-09-14
Payer: COMMERCIAL

## 2022-09-14 PROCEDURE — 97110 THERAPEUTIC EXERCISES: CPT | Mod: CQ

## 2022-09-14 NOTE — PROGRESS NOTES
"[facility]   Physical Therapy Treatment Note     Name: Papo Bell  Clinic Number: [id number]    Therapy Diagnosis: No diagnosis found.  Physician: Collins Stahl MD    Visit Date: 9/14/2022    Physician Orders: PT Eval and Treat   Medical Diagnosis from Referral: R TKA   Evaluation Date: 9/12/2022  Authorization Period Expiration: 9/12/2023  Plan of Care Expiration: 10/14/2022  Visit # / Visits authorized: 2/12    PTA Visit #: 1/5     Time In: 9:02  Time Out: 9:50  Total Billable Time: 48 minutes    Precautions:  Standard     SUBJECTIVE     Pt reports: No new complaints.  Reports to PT treatment ambulating with sc.     He was compliant with home exercise program.  Response to previous treatment: No adverse effects reported  Functional change: Early in POC     Pain: 2/10  Location: right knee      OBJECTIVE     Objective Measures updated at progress report unless specified.     Treatment     Papo received the treatments listed below:        THERAPEUTIC EXERCISES to develop strength, endurance, ROM, and flexibility for 43 minutes including :See flowsheet below      Ther-Ex  Reps          NuStep  10 minutes *   Wedge  2 minutes    HS stretch  3 x 30"    Heel slides (seated)  30 x    LAQs  30 x 3" *   HS curls  20 x green TB    SAQs  20 x 3"    Heel Slides  30 x (120 degrees)    Quad Sets  30 x 3"    SLR  1 x 10 with min A              Patient Education and Home Exercises     Home Exercises Provided and Patient Education Provided     Education provided:   - POC, HEP, DOMS     Written Home Exercises Provided: yes. Exercises were reviewed and Papo was able to demonstrate them prior to the end of the session.  Papo demonstrated good understanding of the education provided. See EMR under Patient Instructions for exercises provided during therapy sessions    ASSESSMENT     Papo is a 51 y.o. male referred to outpatient Physical Therapy with a medical diagnosis of R TKA. Pt presents " "with decreased R LE strength, R knee pain, and abnormal movement mechanics. Increased reps with previously instructed Ther Ex as tolerated by patient.  Patient presents with mod Quad insufficiency when completing Quad sets, and supine SLR's.  AROM Right knee extension to flexion 5-120.  Patient declined modalities in rehab gym, and states "I will put ice on it at home".  No adverse effects noted.      Papo Is progressing well towards his goals.   Pt prognosis is Good.     Pt will continue to benefit from skilled outpatient physical therapy to address the deficits listed in the problem list box on initial evaluation, provide pt/family education and to maximize pt's level of independence in the home and community environment.     Pt's spiritual, cultural and educational needs considered and pt agreeable to plan of care and goals.     Anticipated barriers to physical therapy: Compliance with HEP    Goals: (to be met by end of POC)   Pt will increase R knee flexion to R, knee extension to 0 degrees, and quad lag to 0 degrees to allow patient normal gait pattern.  Pt will increase R knee strength to 4/5 to allow patient to safely return to prior level of function   Pt will report decrease in pain to 2/10 to improve quality of life.  Patient will complete 5 x sit<> less than or equal to 12 seconds to demonstrate improved B LE power.   Patient will independently ambulate community distances with a gait speed of greater than or equal to 3.25 feet per second    PLAN     Plan of care Certification: 9/12/2022 to 10/14/2022.     Outpatient Physical Therapy 3 times weekly for 4 weeks to include the following interventions: Electrical Stimulation: unattended, Gait Training, Manual Therapy, Moist Heat/ Ice, Patient Education, Therapeutic Activities, Therapeutic Exercise, and vasopneumatic device.      Continue POC per PT order to progress patient toward rehab goals as tolerated by patient.   Padmaja Augustine, PTA  " 9/14/2022

## 2022-09-26 ENCOUNTER — CLINICAL SUPPORT (OUTPATIENT)
Dept: REHABILITATION | Facility: HOSPITAL | Age: 52
End: 2022-09-26
Payer: COMMERCIAL

## 2022-09-26 DIAGNOSIS — Z96.651 S/P TOTAL KNEE REPLACEMENT, RIGHT: Primary | ICD-10-CM

## 2022-09-26 PROCEDURE — 97530 THERAPEUTIC ACTIVITIES: CPT

## 2022-09-26 PROCEDURE — 97110 THERAPEUTIC EXERCISES: CPT

## 2022-09-26 NOTE — PROGRESS NOTES
"[facility]   Physical Therapy Treatment Note     Name: Papo Bell  Clinic Number: [id number]    Therapy Diagnosis: No diagnosis found.  Physician: Collins Stahl MD    Visit Date: 9/26/2022    Physician Orders: PT Eval and Treat   Medical Diagnosis from Referral: R TKA   Evaluation Date: 9/12/2022  Authorization Period Expiration: 9/12/2023  Plan of Care Expiration: 10/14/2022  Visit # / Visits authorized: 3/12    PTA Visit #: 0/5     Time In: 8:09  Time Out: 8:53  Total Billable Time: 44 minutes    Precautions:  Standard     SUBJECTIVE     Pt reports: "I feel pretty good."     He was compliant with home exercise program.  Response to previous treatment: No adverse effects reported  Functional change: Improved ambulation    Pain: 1/10  Location: right knee      OBJECTIVE     Objective Measures updated at progress report unless specified.     Treatment     Papo received the treatments listed below:      THERAPEUTIC EXERCISES to develop strength, endurance, ROM, and flexibility for 43 minutes including :See flowsheet below      Ther-Ex  Reps          NuStep  8 minutes    Wedge  2 minutes    HS stretch  3 x 30"    Heel slides (seated)  30 x    LAQs  30 x 3"    HS curls  20 x green TB    SAQs  20 x 3"    Heel Slides  30 x (120 degrees)    Quad Sets  30 x 3"    SLR  1 x 10 with min A (not today)         Ther-Act Reps   Heel Raises 2 x 10   Standing hip and knee flexion 2 x 10   3 way hip 15 x each   Sit <> stands    Mini Squats         Patient Education and Home Exercises     Home Exercises Provided and Patient Education Provided     Education provided:   - POC, HEP, DOMS     Written Home Exercises Provided: yes. Exercises were reviewed and Papo was able to demonstrate them prior to the end of the session.  Papo demonstrated good understanding of the education provided. See EMR under Patient Instructions for exercises provided during therapy sessions    ASSESSMENT     Papo is a 51 " "y.o. male referred to outpatient Physical Therapy with a medical diagnosis of R TKA. Pt presents with decreased R LE strength, R knee pain, and abnormal movement mechanics. PT added standing activities to improve B knee and hip strength and stability. Pt with increased fatigue requiring rest breaks during added standing tasks. Pt declined modalities post mat exercises due to reporting "I have ice I put on while sitting around." Pt stated that "I feel those new exercises" upon leaving treatment. No adverse effects noted.      Papo Is progressing well towards his goals.   Pt prognosis is Good.     Pt will continue to benefit from skilled outpatient physical therapy to address the deficits listed in the problem list box on initial evaluation, provide pt/family education and to maximize pt's level of independence in the home and community environment.     Pt's spiritual, cultural and educational needs considered and pt agreeable to plan of care and goals.     Anticipated barriers to physical therapy: Compliance with HEP    Goals: (to be met by end of POC)   Pt will increase R knee flexion to R, knee extension to 0 degrees, and quad lag to 0 degrees to allow patient normal gait pattern.  Pt will increase R knee strength to 4/5 to allow patient to safely return to prior level of function   Pt will report decrease in pain to 2/10 to improve quality of life.  Patient will complete 5 x sit<> less than or equal to 12 seconds to demonstrate improved B LE power.   Patient will independently ambulate community distances with a gait speed of greater than or equal to 3.25 feet per second    PLAN     Plan of care Certification: 9/12/2022 to 10/14/2022.     Outpatient Physical Therapy 3 times weekly for 4 weeks to include the following interventions: Electrical Stimulation: unattended, Gait Training, Manual Therapy, Moist Heat/ Ice, Patient Education, Therapeutic Activities, Therapeutic Exercise, and vasopneumatic device. "      Continue POC per PT order to progress patient toward rehab goals as tolerated by patient.     Esther Rosenberg, PT, DPT      9/26/2022

## 2022-09-30 ENCOUNTER — DOCUMENTATION ONLY (OUTPATIENT)
Dept: REHABILITATION | Facility: HOSPITAL | Age: 52
End: 2022-09-30

## 2022-09-30 NOTE — PROGRESS NOTES
Upon arrival to PT treatment patient reported that he felt dizzy. LPTA offered to check patient's BP, but patient declined stating that he took his medicine already and reported that he wanted to go home. LPTA walked with patient outside, and patient reported that his wife was here. Patient stated that his wife was not in their car, and stated that he would call her because she might be in the hospital cafeteria eating breakfast. LPTA monitored patient to make sure he got into the passenger seat of his car safely in the hospital parking lot. BENJAMIN Galvin

## 2022-10-10 ENCOUNTER — CLINICAL SUPPORT (OUTPATIENT)
Dept: REHABILITATION | Facility: HOSPITAL | Age: 52
End: 2022-10-10
Payer: COMMERCIAL

## 2022-10-10 PROCEDURE — 97110 THERAPEUTIC EXERCISES: CPT | Mod: CQ

## 2022-10-10 PROCEDURE — 97530 THERAPEUTIC ACTIVITIES: CPT | Mod: CQ

## 2022-10-10 NOTE — PROGRESS NOTES
"[facility]   Physical Therapy Treatment Note     Name: Papo Bell  Clinic Number: [id number]    Therapy Diagnosis: No diagnosis found.  Physician: Collins Stahl MD    Visit Date: 10/10/2022    Physician Orders: PT Eval and Treat   Medical Diagnosis from Referral: R TKA   Evaluation Date: 9/12/2022  Authorization Period Expiration: 9/12/2023  Plan of Care Expiration: 10/14/2022  Visit # / Visits authorized: 5/12    PTA Visit #: 2/5    Time In: 8:59  Time Out: 9:46  Total Billable Time: 47 minutes     Precautions:  Standard     SUBJECTIVE     Pt reports:  "My knee has been feeling pretty good lately".     He was compliant with home exercise program.  Response to previous treatment: No adverse effects reported  Functional change: Improved ambulation    Pain: 0/10  Location: right knee      OBJECTIVE     Objective Measures updated at progress report unless specified.     Treatment     Papo received the treatments listed below:      THERAPEUTIC EXERCISES to develop strength, endurance, ROM, and flexibility.  :See flowsheet below:     Ther-Ex  Reps          NuStep  10 minutes    Wedge  2 minutes    HS stretch  3 x 30"    Heel slides (seated)  30 x (120)    LAQs  2 x 10, 2#    Hip adduction with ball  30 x 3"    HS curls  30 x green TB    SAQs  20 x 3" 2#    Heel Slides  30 x (123 degrees)    Quad Sets  30 x 3"    SLR  2 x 10 with min A                THERAPEUTIC ACTIVITIES to improve functional and dynamic balance.  See flow-sheet below: Increased repetitions on FSU and LSU. Added standing terminal knee extension and low step as part of heel raises    Ther-Act Reps   Heel Raises 3 x 10 (low step)   Standing hip and knee flexion 2 x 10   3 way hip 15 x each (Not Today)   Sit <> stands 10 x    Mini Squats  2 x 10 *    FSU  20 x    LSU  20 x     Standing TKE 2 x 10       Patient Education and Home Exercises     Home Exercises Provided and Patient Education Provided     Education provided: "   -HEP     Written Home Exercises Provided: yes. Exercises were reviewed and Papo was able to demonstrate them prior to the end of the session.  Papo demonstrated good understanding of the education provided. See EMR under Patient Instructions for exercises provided during therapy sessions    ASSESSMENT     Ppao is a 51 y.o. male referred to outpatient Physical Therapy with a medical diagnosis of R TKA. Pt presents with decreased R LE strength, R knee pain, and abnormal movement mechanics.  Initiated physical therapy treatment session with Nustep with adjustment to improve ROM and warm up. Pt required verbal cues to decrease compensations during therapeutic activity and correct form during therapeutic exercises. Pt displayed some signs of fatigue and required mod rest breaks. Pt displayed slight quad lag during supine SLR. Pt denied application of cold pack at the end of tx and said he would ice at home.    Papo Is progressing well towards his goals.   Pt prognosis is Good.     Pt will continue to benefit from skilled outpatient physical therapy to address the deficits listed in the problem list box on initial evaluation, provide pt/family education and to maximize pt's level of independence in the home and community environment.     Pt's spiritual, cultural and educational needs considered and pt agreeable to plan of care and goals.     Anticipated barriers to physical therapy: Compliance with HEP    Goals: (to be met by end of POC)   Pt will increase R knee flexion to R, knee extension to 0 degrees, and quad lag to 0 degrees to allow patient normal gait pattern.  Pt will increase R knee strength to 4/5 to allow patient to safely return to prior level of function   Pt will report decrease in pain to 2/10 to improve quality of life.  Patient will complete 5 x sit<> less than or equal to 12 seconds to demonstrate improved B LE power.   Patient will independently ambulate community distances with a  gait speed of greater than or equal to 3.25 feet per second    PLAN     Plan of care Certification: 9/12/2022 to 10/14/2022.     Outpatient Physical Therapy 3 times weekly for 4 weeks to include the following interventions: Electrical Stimulation: unattended, Gait Training, Manual Therapy, Moist Heat/ Ice, Patient Education, Therapeutic Activities, Therapeutic Exercise, and vasopneumatic device.      Continue POC per PT order to progress patient toward rehab goals as tolerated by patient.     BENJAMIN Stuart   10/10/2022

## 2022-10-12 ENCOUNTER — CLINICAL SUPPORT (OUTPATIENT)
Dept: REHABILITATION | Facility: HOSPITAL | Age: 52
End: 2022-10-12
Payer: COMMERCIAL

## 2022-10-12 PROCEDURE — 97530 THERAPEUTIC ACTIVITIES: CPT | Mod: CQ

## 2022-10-12 PROCEDURE — 97110 THERAPEUTIC EXERCISES: CPT | Mod: CQ

## 2022-10-12 NOTE — PROGRESS NOTES
"[facility]   Physical Therapy Treatment Note     Name: Papo Bell  Clinic Number: [id number]    Therapy Diagnosis: No diagnosis found.  Physician: Collins Stahl MD    Visit Date: 10/12/2022    Physician Orders: PT Eval and Treat   Medical Diagnosis from Referral: R TKA   Evaluation Date: 9/12/2022  Authorization Period Expiration: 9/12/2023  Plan of Care Expiration: 10/14/2022  Visit # / Visits authorized: 6/12    PTA Visit #: 3/5    Time In: 9:07  Time Out:9:55   Total Billable Time: 48 total treatment time    Precautions:  Standard     SUBJECTIVE     Pt reports:  "It's not my operated knee hurting me.  It's my other knee today."    He was compliant with home exercise program.  Response to previous treatment: No adverse effects reported  Functional change: Improved ambulation    Pain: 0/10  Location: right knee      OBJECTIVE     Objective Measures updated at progress report unless specified.     Treatment     Papo received the treatments listed below:      THERAPEUTIC EXERCISES to develop strength, endurance, ROM, and flexibility.  :See flowsheet below:     Ther-Ex  Reps          NuStep  10 minutes    Wedge  2 minutes    HS stretch  3 x 30"    Heel slides (seated)  30 x (120)    LAQs  2 x 10, 2#    Hip adduction with ball  30 x 3"    HS curls  30 x green TB (not today )    SAQs  20 x 3" 2#    Heel Slides  30 x (123 degrees) (Not today)    Quad Sets  30 x 3"    SLR  2 x 10 with min A                THERAPEUTIC ACTIVITIES to improve functional and dynamic balance.  See flow-sheet below:     Ther-Act Reps   Heel Raises 3 x 10 (low step)   Standing hip and knee flexion 2 x 10   3 way hip 15 x each    Standing Marching  2 x 10 *    Sit <> stands 10 x    Mini Squats  2 x 10    FSU  20 x    LSU  20 x     Standing TKE 2 x 10 (not today )        Patient Education and Home Exercises     Home Exercises Provided and Patient Education Provided     Education provided:   -HEP     Written Home " "Exercises Provided: yes. Exercises were reviewed and Papo was able to demonstrate them prior to the end of the session.  Papo demonstrated good understanding of the education provided. See EMR under Patient Instructions for exercises provided during therapy sessions    ASSESSMENT     Papo is a 51 y.o. male referred to outpatient Physical Therapy with a medical diagnosis of R TKA. Pt presents with decreased R LE strength, R knee pain, and abnormal movement mechanics.  Initiated physical therapy treatment session with Nustep with adjustment to improve ROM and warm up.  Noted patient demonstrating mod difficulty with compensation completing forward and lateral step ups.  As patient was performing LAQ's, patient reports feeling a "click".  Noted Right knee visibly appeared to be dropping downward from Quad with knee extension to flexion.  Notified supervising physical therapist, Esther Rosenberg DPT. Esther Rosenberg DPT, then instructed patient to discontinue LAQ's and to complete only hip and knee extension biased Ther Ex for remainder of treatment session, and says she is going to contact Dr. Stahl.  Patient does not report increased right knee pain, and describes "clicking" as "bothersome".  Patient declined CP at end of treatment, and states "I will ice at home".        Papo Is progressing well towards his goals.   Pt prognosis is Good.     Pt will continue to benefit from skilled outpatient physical therapy to address the deficits listed in the problem list box on initial evaluation, provide pt/family education and to maximize pt's level of independence in the home and community environment.     Pt's spiritual, cultural and educational needs considered and pt agreeable to plan of care and goals.     Anticipated barriers to physical therapy: Compliance with HEP    Goals: (to be met by end of POC)   Pt will increase R knee flexion to R, knee extension to 0 degrees, and quad lag to 0 degrees to allow patient normal " gait pattern.  Pt will increase R knee strength to 4/5 to allow patient to safely return to prior level of function   Pt will report decrease in pain to 2/10 to improve quality of life.  Patient will complete 5 x sit<> less than or equal to 12 seconds to demonstrate improved B LE power.   Patient will independently ambulate community distances with a gait speed of greater than or equal to 3.25 feet per second    PLAN     Plan of care Certification: 9/12/2022 to 10/14/2022.     Outpatient Physical Therapy 3 times weekly for 4 weeks to include the following interventions: Electrical Stimulation: unattended, Gait Training, Manual Therapy, Moist Heat/ Ice, Patient Education, Therapeutic Activities, Therapeutic Exercise, and vasopneumatic device.      Continue POC per PT order to progress patient toward rehab goals as tolerated by patient.     BENJAMIN Garcia    10/12/2022

## 2022-10-19 ENCOUNTER — CLINICAL SUPPORT (OUTPATIENT)
Dept: REHABILITATION | Facility: HOSPITAL | Age: 52
End: 2022-10-19
Payer: COMMERCIAL

## 2022-10-19 PROCEDURE — 97530 THERAPEUTIC ACTIVITIES: CPT | Mod: CQ

## 2022-10-19 PROCEDURE — 97110 THERAPEUTIC EXERCISES: CPT | Mod: CQ

## 2022-10-19 NOTE — PROGRESS NOTES
"[facility]   Physical Therapy Treatment Note     Name: Papo Bell  Clinic Number: [id number]    Therapy Diagnosis: No diagnosis found.  Physician: Collins Stahl MD    Visit Date: 10/19/2022    Physician Orders: PT Eval and Treat   Medical Diagnosis from Referral: R TKA   Evaluation Date: 9/12/2022  Authorization Period Expiration: 9/12/2023  Plan of Care Expiration: 10/14/2022  Visit # / Visits authorized: 6/12    PTA Visit #: 4/5    Time In: 9:02  Time Out: 9:56  Total Billable Time: 54 minutes     Precautions:  Standard     SUBJECTIVE     Pt reports: "I went to the doctor yesterday.  They x rayed my knee and told me everything looked good and to continue with my physical therapy".     He was compliant with home exercise program.  Response to previous treatment: No adverse effects reported  Functional change: Improved ambulation    Pain: 0/10  Location: right knee      OBJECTIVE     Objective Measures updated at progress report unless specified.     Treatment     Papo received the treatments listed below:      THERAPEUTIC EXERCISES to develop strength, endurance, ROM, and flexibility.  :See flowsheet below:       Ther-Ex  Reps          NuStep  10 minutes    Wedge  2 minutes    HS stretch  3 x 30"    Heel slides (seated)  30 x (120)    LAQs  2 x 10, 2#    Hip adduction with ball  30 x 3"    HS curls  30 x green TB    SAQs  20 x 3" 2#    Heel Slides  30 x (123 degrees)    Quad Sets  30 x 3"    SLR  2 x 10 with min A                THERAPEUTIC ACTIVITIES to improve functional and dynamic balance.  See flow-sheet below: 3 way hip completed on each LE.  Increased reps of sit to stands.  Added resistance band to stand TKE's.     Ther-Act Reps   Heel Raises 3 x 10    Standing hip and knee flexion 3 x 10    3 way hip 15 x each LE *   Sit <> stands 2 x 10 *    Mini Squats  2 x 10    FSU  20 x low step    LSU  20 x  low step    Standing TKE 2 x 10 red Thera Band *        Patient Education " and Home Exercises     Home Exercises Provided and Patient Education Provided     Education provided:   -HEP, DOMS, POC     Written Home Exercises Provided: yes. Exercises were reviewed and Papo was able to demonstrate them prior to the end of the session.  Papo demonstrated good understanding of the education provided. See EMR under Patient Instructions for exercises provided during therapy sessions    ASSESSMENT     Papo is a 51 y.o. male referred to outpatient Physical Therapy with a medical diagnosis of R TKA. Pt presents with decreased R LE strength, R knee pain, and abnormal movement mechanics.  Initiated physical therapy treatment session with Nustep with adjustment to improve ROM and warm up.  Patient able to complete standing Ther Act with less difficulty noted, and improved motor control with verbal and visual cues to decrease speed of movement.  Patient does not report pain increase during PT treatment session.  Patient declined CP application in gym.      Papo Is progressing well towards his goals.   Pt prognosis is Good.     Pt will continue to benefit from skilled outpatient physical therapy to address the deficits listed in the problem list box on initial evaluation, provide pt/family education and to maximize pt's level of independence in the home and community environment.     Pt's spiritual, cultural and educational needs considered and pt agreeable to plan of care and goals.     Anticipated barriers to physical therapy: Compliance with HEP    Goals: (to be met by end of POC)   Pt will increase R knee flexion to R, knee extension to 0 degrees, and quad lag to 0 degrees to allow patient normal gait pattern.  Pt will increase R knee strength to 4/5 to allow patient to safely return to prior level of function   Pt will report decrease in pain to 2/10 to improve quality of life.  Patient will complete 5 x sit<> less than or equal to 12 seconds to demonstrate improved B LE power.    Patient will independently ambulate community distances with a gait speed of greater than or equal to 3.25 feet per second    PLAN     Plan of care Certification: 9/12/2022 to 10/14/2022.     Outpatient Physical Therapy 3 times weekly for 4 weeks to include the following interventions: Electrical Stimulation: unattended, Gait Training, Manual Therapy, Moist Heat/ Ice, Patient Education, Therapeutic Activities, Therapeutic Exercise, and vasopneumatic device.      Continue POC per PT order to progress patient toward rehab goals as tolerated by patient.     BENJAMIN Garcia    10/19/2022

## 2022-10-24 ENCOUNTER — CLINICAL SUPPORT (OUTPATIENT)
Dept: REHABILITATION | Facility: HOSPITAL | Age: 52
End: 2022-10-24
Payer: COMMERCIAL

## 2022-10-24 DIAGNOSIS — M25.561 ACUTE PAIN OF RIGHT KNEE: ICD-10-CM

## 2022-10-24 PROCEDURE — 97110 THERAPEUTIC EXERCISES: CPT | Mod: CQ

## 2022-10-24 PROCEDURE — 97530 THERAPEUTIC ACTIVITIES: CPT | Mod: CQ

## 2022-10-24 NOTE — PROGRESS NOTES
"[facility]   Physical Therapy Treatment Note     Name: Papo Bell  Clinic Number: [id number]    Therapy Diagnosis: No diagnosis found.  Physician: Collins Stahl MD    Visit Date: 10/24/2022    Physician Orders: PT Eval and Treat   Medical Diagnosis from Referral: R TKA   Evaluation Date: 9/12/2022  Authorization Period Expiration: 9/12/2023  Plan of Care Expiration: 10/14/2022  Visit # / Visits authorized: 7/12    PTA Visit #: 5/5    Time In: 9:00  Time Out: 9:58  Total Billable Time: 58 minutes     Precautions:  Standard     SUBJECTIVE     Pt reports: "I'm doing fine. My knee has been feeling good".     He was compliant with home exercise program.  Response to previous treatment: No adverse effects reported  Functional change: Improved ambulation    Pain: 1/10  Location: right knee      OBJECTIVE     Objective Measures updated at progress report unless specified.     Treatment     Papo received the treatments listed below:      THERAPEUTIC EXERCISES to develop strength, endurance, ROM, and flexibility.  :See flowsheet below: Increased repetitions with LAQ    Ther-Ex  Reps          NuStep  10 minutes    Wedge  2 minutes    HS stretch  3 x 30"    Heel slides (seated)  30 x (122)    LAQs  3 x 10, 2#    Hip adduction with ball  30 x 3"    HS curls  30 x green TB    SAQs  20 x 3" 2#    Heel Slides  30 x (123 degrees)    Quad Sets  30 x 3"    SLR  2 x 10 with min A                THERAPEUTIC ACTIVITIES to improve functional and dynamic balance.  See flow-sheet below:     Ther-Act Reps   Heel Raises 3 x 10    Standing hip and knee flexion 3 x 10    3 way hip 15 x each LE    Sit <> stands 2 x 10    Mini Squats  2 x 10    FSU  20 x low step    LSU  20 x  low step    Standing TKE 2 x 10 red Thera Band        Patient Education and Home Exercises     Home Exercises Provided and Patient Education Provided     Education provided:   -HEP, DOMS, POC     Written Home Exercises Provided: yes. " Exercises were reviewed and Papo was able to demonstrate them prior to the end of the session.  Papo demonstrated good understanding of the education provided. See EMR under Patient Instructions for exercises provided during therapy sessions    ASSESSMENT     Papo is a 51 y.o. male referred to outpatient Physical Therapy with a medical diagnosis of R TKA. Pt presents with decreased R LE strength, R knee pain, and abnormal movement mechanics.  Initiated PT tx session with Nustep with adjustment to improve ROM and warm up.  Pt displayed minimal signs of fatigue during LAQ. Pt displayed increased strength throughout tx but still requires mod rest breaks secondary to decreased endurance. Pt completed PT tx with no signs of increase pain and no adverse effects. Pt declined CP application in gym.      Papo Is progressing well towards his goals.   Pt prognosis is Good.     Pt will continue to benefit from skilled outpatient physical therapy to address the deficits listed in the problem list box on initial evaluation, provide pt/family education and to maximize pt's level of independence in the home and community environment.     Pt's spiritual, cultural and educational needs considered and pt agreeable to plan of care and goals.     Anticipated barriers to physical therapy: Compliance with HEP    Goals: (to be met by end of POC)   Pt will increase R knee flexion to R, knee extension to 0 degrees, and quad lag to 0 degrees to allow patient normal gait pattern.  Pt will increase R knee strength to 4/5 to allow patient to safely return to prior level of function   Pt will report decrease in pain to 2/10 to improve quality of life.  Patient will complete 5 x sit<> less than or equal to 12 seconds to demonstrate improved B LE power.   Patient will independently ambulate community distances with a gait speed of greater than or equal to 3.25 feet per second    PLAN     Plan of care Certification: 9/12/2022 to  10/14/2022.     Outpatient Physical Therapy 3 times weekly for 4 weeks to include the following interventions: Electrical Stimulation: unattended, Gait Training, Manual Therapy, Moist Heat/ Ice, Patient Education, Therapeutic Activities, Therapeutic Exercise, and vasopneumatic device.      Continue POC per PT order to progress patient toward rehab goals as tolerated by patient.     BENJAMIN Stuart    10/24/2022

## 2022-10-27 ENCOUNTER — OFFICE VISIT (OUTPATIENT)
Dept: PRIMARY CARE CLINIC | Facility: CLINIC | Age: 52
End: 2022-10-27
Payer: COMMERCIAL

## 2022-10-27 ENCOUNTER — CLINICAL SUPPORT (OUTPATIENT)
Dept: REHABILITATION | Facility: HOSPITAL | Age: 52
End: 2022-10-27
Payer: COMMERCIAL

## 2022-10-27 VITALS
RESPIRATION RATE: 20 BRPM | TEMPERATURE: 99 F | OXYGEN SATURATION: 98 % | HEART RATE: 87 BPM | SYSTOLIC BLOOD PRESSURE: 135 MMHG | WEIGHT: 280 LBS | DIASTOLIC BLOOD PRESSURE: 73 MMHG | HEIGHT: 68 IN | BODY MASS INDEX: 42.44 KG/M2

## 2022-10-27 DIAGNOSIS — I10 ESSENTIAL HYPERTENSION: ICD-10-CM

## 2022-10-27 DIAGNOSIS — M25.561 ACUTE PAIN OF RIGHT KNEE: Primary | ICD-10-CM

## 2022-10-27 DIAGNOSIS — R31.9 HEMATURIA, UNSPECIFIED TYPE: Primary | ICD-10-CM

## 2022-10-27 DIAGNOSIS — E11.9 TYPE 2 DIABETES MELLITUS WITHOUT COMPLICATION, WITHOUT LONG-TERM CURRENT USE OF INSULIN: ICD-10-CM

## 2022-10-27 LAB
ALBUMIN SERPL BCP-MCNC: 3.3 G/DL (ref 3.5–5)
ALBUMIN/GLOB SERPL: 0.7 {RATIO}
ALP SERPL-CCNC: 71 U/L (ref 45–115)
ALT SERPL W P-5'-P-CCNC: 26 U/L (ref 16–61)
ANION GAP SERPL CALCULATED.3IONS-SCNC: 11 MMOL/L (ref 7–16)
AST SERPL W P-5'-P-CCNC: 39 U/L (ref 15–37)
BASOPHILS # BLD AUTO: 0.03 K/UL (ref 0–0.2)
BASOPHILS NFR BLD AUTO: 0.4 % (ref 0–1)
BILIRUB SERPL-MCNC: 0.5 MG/DL (ref ?–1.2)
BILIRUB SERPL-MCNC: NORMAL MG/DL
BLOOD URINE, POC: NORMAL
BUN SERPL-MCNC: 15 MG/DL (ref 7–18)
BUN/CREAT SERPL: 15 (ref 6–20)
CALCIUM SERPL-MCNC: 8.1 MG/DL (ref 8.5–10.1)
CHLORIDE SERPL-SCNC: 103 MMOL/L (ref 98–107)
CO2 SERPL-SCNC: 27 MMOL/L (ref 21–32)
COLOR, POC UA: NORMAL
CREAT SERPL-MCNC: 1.03 MG/DL (ref 0.7–1.3)
CREAT UR-MCNC: 339 MG/DL (ref 39–259)
DIFFERENTIAL METHOD BLD: ABNORMAL
EGFR (NO RACE VARIABLE) (RUSH/TITUS): 86 ML/MIN/1.73M²
EOSINOPHIL # BLD AUTO: 0.03 K/UL (ref 0–0.5)
EOSINOPHIL NFR BLD AUTO: 0.4 % (ref 1–4)
ERYTHROCYTE [DISTWIDTH] IN BLOOD BY AUTOMATED COUNT: 15.5 % (ref 11.5–14.5)
EST. AVERAGE GLUCOSE BLD GHB EST-MCNC: 71 MG/DL
GLOBULIN SER-MCNC: 4.5 G/DL (ref 2–4)
GLUCOSE SERPL-MCNC: 98 MG/DL (ref 74–106)
GLUCOSE UR QL STRIP: NEGATIVE
HBA1C MFR BLD HPLC: 4.7 % (ref 4.5–6.6)
HCT VFR BLD AUTO: 35.3 % (ref 40–54)
HGB BLD-MCNC: 12.3 G/DL (ref 13.5–18)
IMM GRANULOCYTES # BLD AUTO: 0.05 K/UL (ref 0–0.04)
IMM GRANULOCYTES NFR BLD: 0.7 % (ref 0–0.4)
KETONES UR QL STRIP: NEGATIVE
LEUKOCYTE ESTERASE URINE, POC: NEGATIVE
LYMPHOCYTES # BLD AUTO: 1.85 K/UL (ref 1–4.8)
LYMPHOCYTES NFR BLD AUTO: 27.2 % (ref 27–41)
MCH RBC QN AUTO: 35.7 PG (ref 27–31)
MCHC RBC AUTO-ENTMCNC: 34.8 G/DL (ref 32–36)
MCV RBC AUTO: 102.3 FL (ref 80–96)
MICROALBUMIN UR-MCNC: 7.2 MG/DL (ref 0–2.8)
MICROALBUMIN/CREAT RATIO PNL UR: 21.2 MG/G (ref 0–30)
MONOCYTES # BLD AUTO: 0.59 K/UL (ref 0–0.8)
MONOCYTES NFR BLD AUTO: 8.7 % (ref 2–6)
MPC BLD CALC-MCNC: 10 FL (ref 9.4–12.4)
NEUTROPHILS # BLD AUTO: 4.24 K/UL (ref 1.8–7.7)
NEUTROPHILS NFR BLD AUTO: 62.6 % (ref 53–65)
NITRITE, POC UA: NEGATIVE
NRBC # BLD AUTO: 0 X10E3/UL
NRBC, AUTO (.00): 0 %
PH, POC UA: 6
PLATELET # BLD AUTO: 170 K/UL (ref 150–400)
POTASSIUM SERPL-SCNC: 2.9 MMOL/L (ref 3.5–5.1)
PROT SERPL-MCNC: 7.8 G/DL (ref 6.4–8.2)
PROTEIN, POC: 30
PSA SERPL-MCNC: 0.64 NG/ML
RBC # BLD AUTO: 3.45 M/UL (ref 4.6–6.2)
SODIUM SERPL-SCNC: 138 MMOL/L (ref 136–145)
SPECIFIC GRAVITY, POC UA: 1.02
UROBILINOGEN, POC UA: 1
WBC # BLD AUTO: 6.79 K/UL (ref 4.5–11)

## 2022-10-27 PROCEDURE — 85025 COMPLETE CBC W/AUTO DIFF WBC: CPT | Mod: ,,, | Performed by: CLINICAL MEDICAL LABORATORY

## 2022-10-27 PROCEDURE — 3078F PR MOST RECENT DIASTOLIC BLOOD PRESSURE < 80 MM HG: ICD-10-PCS | Mod: CPTII,,, | Performed by: NURSE PRACTITIONER

## 2022-10-27 PROCEDURE — 87077 CULTURE, URINE: ICD-10-PCS | Mod: ,,, | Performed by: CLINICAL MEDICAL LABORATORY

## 2022-10-27 PROCEDURE — 99214 OFFICE O/P EST MOD 30 MIN: CPT | Mod: ,,, | Performed by: NURSE PRACTITIONER

## 2022-10-27 PROCEDURE — 87086 CULTURE, URINE: ICD-10-PCS | Mod: ,,, | Performed by: CLINICAL MEDICAL LABORATORY

## 2022-10-27 PROCEDURE — 3075F PR MOST RECENT SYSTOLIC BLOOD PRESS GE 130-139MM HG: ICD-10-PCS | Mod: CPTII,,, | Performed by: NURSE PRACTITIONER

## 2022-10-27 PROCEDURE — G0103 PSA SCREENING: HCPCS | Mod: ,,, | Performed by: CLINICAL MEDICAL LABORATORY

## 2022-10-27 PROCEDURE — 3075F SYST BP GE 130 - 139MM HG: CPT | Mod: CPTII,,, | Performed by: NURSE PRACTITIONER

## 2022-10-27 PROCEDURE — 1160F PR REVIEW ALL MEDS BY PRESCRIBER/CLIN PHARMACIST DOCUMENTED: ICD-10-PCS | Mod: CPTII,,, | Performed by: NURSE PRACTITIONER

## 2022-10-27 PROCEDURE — 97530 THERAPEUTIC ACTIVITIES: CPT | Mod: CQ

## 2022-10-27 PROCEDURE — 1160F RVW MEDS BY RX/DR IN RCRD: CPT | Mod: CPTII,,, | Performed by: NURSE PRACTITIONER

## 2022-10-27 PROCEDURE — 1159F PR MEDICATION LIST DOCUMENTED IN MEDICAL RECORD: ICD-10-PCS | Mod: CPTII,,, | Performed by: NURSE PRACTITIONER

## 2022-10-27 PROCEDURE — 83036 HEMOGLOBIN A1C: ICD-10-PCS | Mod: ,,, | Performed by: CLINICAL MEDICAL LABORATORY

## 2022-10-27 PROCEDURE — 81003 POCT URINALYSIS W/O SCOPE: ICD-10-PCS | Mod: QW,,, | Performed by: NURSE PRACTITIONER

## 2022-10-27 PROCEDURE — 87186 CULTURE, URINE: ICD-10-PCS | Mod: ,,, | Performed by: CLINICAL MEDICAL LABORATORY

## 2022-10-27 PROCEDURE — 87077 CULTURE AEROBIC IDENTIFY: CPT | Mod: ,,, | Performed by: CLINICAL MEDICAL LABORATORY

## 2022-10-27 PROCEDURE — 85025 CBC WITH DIFFERENTIAL: ICD-10-PCS | Mod: ,,, | Performed by: CLINICAL MEDICAL LABORATORY

## 2022-10-27 PROCEDURE — 82043 MICROALBUMIN / CREATININE RATIO URINE: ICD-10-PCS | Mod: ,,, | Performed by: CLINICAL MEDICAL LABORATORY

## 2022-10-27 PROCEDURE — 99214 PR OFFICE/OUTPT VISIT, EST, LEVL IV, 30-39 MIN: ICD-10-PCS | Mod: ,,, | Performed by: NURSE PRACTITIONER

## 2022-10-27 PROCEDURE — 82043 UR ALBUMIN QUANTITATIVE: CPT | Mod: ,,, | Performed by: CLINICAL MEDICAL LABORATORY

## 2022-10-27 PROCEDURE — 80053 COMPREHENSIVE METABOLIC PANEL: ICD-10-PCS | Mod: ,,, | Performed by: CLINICAL MEDICAL LABORATORY

## 2022-10-27 PROCEDURE — 82570 MICROALBUMIN / CREATININE RATIO URINE: ICD-10-PCS | Mod: ,,, | Performed by: CLINICAL MEDICAL LABORATORY

## 2022-10-27 PROCEDURE — 3044F HG A1C LEVEL LT 7.0%: CPT | Mod: CPTII,,, | Performed by: NURSE PRACTITIONER

## 2022-10-27 PROCEDURE — 3078F DIAST BP <80 MM HG: CPT | Mod: CPTII,,, | Performed by: NURSE PRACTITIONER

## 2022-10-27 PROCEDURE — 80053 COMPREHEN METABOLIC PANEL: CPT | Mod: ,,, | Performed by: CLINICAL MEDICAL LABORATORY

## 2022-10-27 PROCEDURE — 81003 URINALYSIS AUTO W/O SCOPE: CPT | Mod: QW,,, | Performed by: NURSE PRACTITIONER

## 2022-10-27 PROCEDURE — 97110 THERAPEUTIC EXERCISES: CPT | Mod: CQ

## 2022-10-27 PROCEDURE — 1159F MED LIST DOCD IN RCRD: CPT | Mod: CPTII,,, | Performed by: NURSE PRACTITIONER

## 2022-10-27 PROCEDURE — 82570 ASSAY OF URINE CREATININE: CPT | Mod: ,,, | Performed by: CLINICAL MEDICAL LABORATORY

## 2022-10-27 PROCEDURE — G0103 PSA, SCREENING: ICD-10-PCS | Mod: ,,, | Performed by: CLINICAL MEDICAL LABORATORY

## 2022-10-27 PROCEDURE — 3044F PR MOST RECENT HEMOGLOBIN A1C LEVEL <7.0%: ICD-10-PCS | Mod: CPTII,,, | Performed by: NURSE PRACTITIONER

## 2022-10-27 PROCEDURE — 87186 SC STD MICRODIL/AGAR DIL: CPT | Mod: ,,, | Performed by: CLINICAL MEDICAL LABORATORY

## 2022-10-27 PROCEDURE — 83036 HEMOGLOBIN GLYCOSYLATED A1C: CPT | Mod: ,,, | Performed by: CLINICAL MEDICAL LABORATORY

## 2022-10-27 PROCEDURE — 87086 URINE CULTURE/COLONY COUNT: CPT | Mod: ,,, | Performed by: CLINICAL MEDICAL LABORATORY

## 2022-10-27 RX ORDER — DICLOFENAC SODIUM 75 MG/1
75 TABLET, DELAYED RELEASE ORAL 2 TIMES DAILY
COMMUNITY
Start: 2022-06-14

## 2022-10-27 RX ORDER — HYDROCODONE BITARTRATE AND ACETAMINOPHEN 5; 325 MG/1; MG/1
1 TABLET ORAL 3 TIMES DAILY
COMMUNITY
Start: 2022-09-06

## 2022-10-27 RX ORDER — OMEPRAZOLE 40 MG/1
CAPSULE, DELAYED RELEASE ORAL
COMMUNITY
Start: 2022-09-25 | End: 2023-09-19

## 2022-10-27 RX ORDER — CIPROFLOXACIN 500 MG/1
500 TABLET ORAL EVERY 12 HOURS
Qty: 14 TABLET | Refills: 0 | Status: SHIPPED | OUTPATIENT
Start: 2022-10-27 | End: 2022-10-31

## 2022-10-27 RX ORDER — ASPIRIN 81 MG/1
81 TABLET ORAL 2 TIMES DAILY
COMMUNITY
Start: 2022-08-19 | End: 2023-01-30 | Stop reason: SDUPTHER

## 2022-10-27 NOTE — PROGRESS NOTES
[facility]  Primary Care       PATIENT NAME: Papo Bell   : 1970    AGE: 52 y.o. DATE: 10/27/2022    MRN: [id number]        Reason for Visit / Chief Complaint:  Urinary Tract Infection (Blood in urine , frequent urine)     Subjective:     HPI: Patient states he has blood in urine; states he noticed it yesterday; denies any burning with urination. Denies any low back pain, nausea, vomiting, or fever. States he noticed blood in urine about a month ago.     Urinary Tract Infection   Associated symptoms include hematuria. Pertinent negatives include no rash.        Review of Systems: Review of Systems   Constitutional:  Negative for fever.   Respiratory:  Negative for cough and shortness of breath.    Cardiovascular:  Negative for chest pain.   Gastrointestinal: Negative.    Genitourinary:  Positive for hematuria. Negative for dysuria.   Musculoskeletal:  Negative for back pain and gait problem.   Skin:  Negative for rash.   Neurological:  Negative for headaches.        Review of patient's allergies indicates:  No Known Allergies     Med List:  Current Outpatient Medications on File Prior to Visit   Medication Sig Dispense Refill    amLODIPine (NORVASC) 10 MG tablet TAKE 1 TABLET DAILY 90 tablet 1    aspirin (ECOTRIN) 81 MG EC tablet Take 81 mg by mouth 2 (two) times daily.      atorvastatin (LIPITOR) 20 MG tablet TAKE 1 TABLET EVERY EVENING 90 tablet 1    cetirizine (ZYRTEC) 10 MG tablet Take 1 tablet (10 mg total) by mouth once daily. 30 tablet 11    diclofenac (VOLTAREN) 75 MG EC tablet Take 75 mg by mouth 2 (two) times daily.      ergocalciferol (ERGOCALCIFEROL) 50,000 unit Cap Take 1 capsule by mouth weekly for 12 weeks then take 1 capsule monthly after that 12 capsule 2    esomeprazole (NEXIUM) 20 MG capsule Take 1 capsule (20 mg total) by mouth before breakfast. 30 capsule 11    gabapentin (NEURONTIN) 100 MG capsule Take 1 capsule (100 mg total) by mouth 3 (three) times daily. 90  capsule 5    hydroCHLOROthiazide (MICROZIDE) 12.5 mg capsule       HYDROcodone-acetaminophen (NORCO) 5-325 mg per tablet Take 1 tablet by mouth 3 (three) times daily.      metFORMIN (GLUCOPHAGE) 500 MG tablet TAKE 1 TABLET DAILY WITH   BREAKFAST 90 tablet 1    mineral oil-hydrophil petrolat (AQUAPHOR) Oint Apply topically as needed (dry skin). 396 g 1    omega 3-dha-epa-fish oil (FISH OIL) 1,000 mg (120 mg-180 mg) Cap Take 1 capsule by mouth once daily. 90 capsule 3    omeprazole (PRILOSEC) 40 MG capsule       potassium chloride SA (K-DUR,KLOR-CON) 20 MEQ tablet Take 1 tablet (20 mEq total) by mouth once daily. 30 tablet 5    tadalafiL (CIALIS) 5 MG tablet Take one tablet prn before sexual activity; effects may last 36 hours. Max dose is 20 mg/dose up to 1 dose/24 hours. 10 tablet 1    timolol maleate 0.5% (TIMOPTIC) 0.5 % Drop PUT 1 DROP INTO AFFECTED EYE(S) EVERY MORNING       No current facility-administered medications on file prior to visit.       Medical/Social/Family History:  Past Medical History:   Diagnosis Date    BMI 45.0-49.9, adult 06/20/2018    Cervicalgia 04/07/2021    Diabetes mellitus, type 2     DJD (degenerative joint disease) 03/15/2019    Edema 01/27/2021    Essential hypertension 04/07/2021    GERD (gastroesophageal reflux disease) 01/13/2021    Hyperlipidemia     Hypertension     Low back pain 06/19/2019    Osteoporosis     Pain in right shoulder 04/07/2021    Tachycardia 04/07/2021      Social History     Tobacco Use   Smoking Status Never   Smokeless Tobacco Never      Social History     Substance and Sexual Activity   Alcohol Use Yes       Family History   Problem Relation Age of Onset    Diabetes Mother         AGE 78    Diabetes Father         AGE 54    Hypertension Father     No Known Problems Brother         AGE 55    No Known Problems Brother         AGE 49    Hypertension Brother         AGE 47    No Known Problems Daughter     No Known Problems Daughter     No Known Problems  "Daughter     No Known Problems Son     No Known Problems Son       Past Surgical History:   Procedure Laterality Date    CARPAL TUNNEL RELEASE Left 2/17/2022    Procedure: RELEASE, CARPAL TUNNEL;  Surgeon: Wilber Crocker MD;  Location: [facility] OR;  Service: Orthopedics;  Laterality: Left;    PATELLA TENDONS REPAIR Bilateral     RADIOFREQUENCY ABLATION Left 2/11/2022    Procedure: Left GSV RF Ablation;  Surgeon: Collins Canada DO;  Location: [facility] OR;  Service: Vascular;  Laterality: Left;    TORN MENISCUS Left     ULNAR NERVE TRANSPOSITION Left 2/17/2022    Procedure: TRANSPOSITION, NERVE, ULNAR;  Surgeon: Wilber Crocker MD;  Location: [facility] OR;  Service: Orthopedics;  Laterality: Left;    WRIST FRACTURE SURGERY Left         There is no immunization history on file for this patient.       Objective:      Vitals:    10/27/22 1342   BP: 135/73   BP Location: Left arm   Patient Position: Sitting   BP Method: Large (Automatic)   Pulse: 87   Resp: 20   Temp: 98.7 °F (37.1 °C)   TempSrc: Oral   SpO2: 98%   Weight: 127 kg (280 lb)   Height: 5' 8" (1.727 m)     Body mass index is 42.57 kg/m².     Physical Exam: Physical Exam  Constitutional:       Appearance: Normal appearance.   HENT:      Head: Normocephalic.      Mouth/Throat:      Mouth: Mucous membranes are moist.   Eyes:      Extraocular Movements: Extraocular movements intact.      Conjunctiva/sclera: Conjunctivae normal.      Pupils: Pupils are equal, round, and reactive to light.   Cardiovascular:      Rate and Rhythm: Normal rate and regular rhythm.      Heart sounds: Normal heart sounds.   Pulmonary:      Effort: Pulmonary effort is normal. No respiratory distress.      Breath sounds: Normal breath sounds. No wheezing or rhonchi.   Abdominal:      General: Bowel sounds are normal. There is no distension.      Palpations: Abdomen is soft.      Tenderness: There is no right CVA tenderness or left CVA tenderness.   Musculoskeletal:         " General: Normal range of motion.      Cervical back: Normal range of motion.   Skin:     General: Skin is warm and dry.   Neurological:      General: No focal deficit present.      Mental Status: He is alert and oriented to person, place, and time.      Gait: Gait normal.   Psychiatric:         Mood and Affect: Mood normal.         Behavior: Behavior normal.              Assessment:          ICD-10-CM ICD-9-CM   1. Hematuria, unspecified type  R31.9 599.70   2. Type 2 diabetes mellitus without complication, without long-term current use of insulin  E11.9 250.00   3. Essential hypertension  I10 401.9        Plan:       Hematuria, unspecified type  -     POCT URINALYSIS W/O SCOPE  -     PSA, Screening; Future; Expected date: 10/27/2022  -     ciprofloxacin HCl (CIPRO) 500 MG tablet; Take 1 tablet (500 mg total) by mouth every 12 (twelve) hours. for 7 days  Dispense: 14 tablet; Refill: 0  -     Cancel: Ambulatory referral/consult to Urology; Future; Expected date: 11/03/2022  -     Ambulatory referral/consult to Urology; Future; Expected date: 11/03/2022    Type 2 diabetes mellitus without complication, without long-term current use of insulin  -     CBC Auto Differential; Future; Expected date: 10/27/2022  -     Comprehensive Metabolic Panel; Future; Expected date: 10/27/2022  -     Microalbumin/Creatinine Ratio, Urine  -     Hemoglobin A1C; Future; Expected date: 10/27/2022    Essential hypertension  -     CBC Auto Differential; Future; Expected date: 10/27/2022  -     Comprehensive Metabolic Panel; Future; Expected date: 10/27/2022    Component      Latest Ref Rng & Units 10/27/2022   Color, UA       Dark Yellow   Spec Grav UA       1.025   pH, UA       6.0   WBC, UA       NEGATIVE   Nitrite, UA       NEGATIVE   Protein, POC       30   Glucose, UA       NEGATIVE   Ketones, UA       NEGATIVE   Bilirubin, POC       SMALL   Urobilinogen, UA       1.0   Blood, UA       TRACE-INTACT       New & refilled meds:  Requested  "Prescriptions     Signed Prescriptions Disp Refills    ciprofloxacin HCl (CIPRO) 500 MG tablet 14 tablet 0     Sig: Take 1 tablet (500 mg total) by mouth every 12 (twelve) hours. for 7 days       Follow up in 3 months (on 1/27/2023), or if symptoms worsen or fail to improve, for diabetes, Hypertension.     Patient Instructions   Patient Education       Controlling Your Blood Pressure Through Lifestyle   The Basics   Written by the doctors and editors at [facility]   What does my lifestyle have to do with my blood pressure? -- The things you do and the foods you eat have a big effect on your blood pressure and your overall health. Following the right lifestyle can:  Lower your blood pressure or keep you from getting high blood pressure in the first place  Reduce your need for blood pressure medicines  Make medicines for high blood pressure work better, if you do take them  Lower the chances that you'll have a heart attack or stroke, or develop kidney disease  Which lifestyle choices will help lower my blood pressure? -- Here's what you can do:  Lose weight (if you are overweight)  Choose a diet rich in fruits, vegetables, and low-fat dairy products, and low in meats, sweets, and refined grains  Eat less salt (sodium)  Do something active for at least 30 minutes a day on most days of the week  Limit the amount of alcohol you drink  If you have high blood pressure, it's also very important to quit smoking (if you smoke). Quitting smoking might not bring your blood pressure down. But it will lower the chances that you'll have a heart attack or stroke, and it will help you feel better and live longer.  Start low and go slow -- The changes listed above might sound like a lot, but don't worry. You don't have to change everything all at once. The key to improving your lifestyle is to "start low and go slow." Choose 1 small, specific thing to change and try doing it for a while. If it works for you, keep doing it until it " "becomes a habit. If it doesn't, don't give up. Choose something else to change and see how that goes.  Let's say, for example, that you would like to improve your diet. If you're the type of person who eats cheeseburgers and French fries all the time, you can't switch to eating just salads from one day to the next. When people try to make changes like that, they often fail. Then they feel frustrated and tend to give up. So instead of trying to change everything about your diet in 1 day, change 1 or 2 small things about your diet and give yourself time to get used to those changes. For instance, keep the cheeseburger but give up the French fries. Or eat the same things but cut your portions in half.  As you find things that you are able to change and stick with, keep adding new changes. In time, you will see that you can actually change a lot. You just have to get used to the changes slowly.  Lose weight -- When people think about losing weight, they sometimes make it more complicated than it really is. To lose weight, you have to either eat less or move more. If you do both of those things, it's even better. But there is no single weight-loss diet or activity that's better than any other. When it comes to weight loss, the most effective plan is the one that you'll stick with.  Improve your diet -- There is no single diet that is right for everyone. But in general, a healthy diet can include:  Lots of fruits, vegetables, and whole grains  Some beans, peas, lentils, chickpeas, and similar foods  Some nuts, such as walnuts, almonds, and peanuts  Fat-free or low-fat milk and milk products  Some fish  To have a healthy diet, it's also important to limit or avoid sugar, sweets, meats, and refined grains. (Refined grains are found in white bread, white rice, most forms of pasta, and most packaged "snack" foods.)  Reduce salt -- Many people think that eating a low-sodium diet means avoiding the salt shaker and not adding " "salt when cooking. The truth is, not adding salt at the table or when you cook will only help a little. Almost all of the sodium you eat is already in the food you buy at the grocery store or at restaurants (figure 1).  The most important thing you can do to cut down on sodium is to eat less processed food. That means that you should avoid most foods that are sold in cans, boxes, jars, and bags. You should also eat in restaurants less often.  To reduce the amount of sodium you get, buy fresh or fresh-frozen fruits, vegetables, and meats. (Fresh-frozen foods have had nothing added to them before freezing.) Then you can make meals at home, from scratch, with these ingredients.  As with the other changes, don't try to cut out salt all at once. Instead, choose 1 or 2 foods that have a lot of sodium and try to replace them with low-sodium choices. When you get used to those low-sodium options, find another food or 2 to change. Then keep going, until all the foods you eat are sodium-free or low in sodium.  Become more active -- If you want to be more active, you don't have to go to the gym or get all sweaty. It is possible to increase your activity level while doing everyday things you enjoy. Walking, gardening, and dancing are just a few of the things that you might try. As with all the other changes, the key is not to do too much too fast. If you don't do any activity now, start by walking for just a few minutes every other day. Do that for a few weeks. If you stick with it, try doing it for longer. But if you find that you don't like walking, try a different activity.  Drink less alcohol -- If you are a woman, do not have more than 1 "standard drink" of alcohol a day. If you are a man, do not have more than 2. A "standard drink" is:  A can or bottle that has 12 ounces of beer  A glass that has 5 ounces of wine  A shot that has 1.5 ounces of whiskey  Where should I start? -- If you want to improve your lifestyle, start " "by making the changes that you think would be easiest for you. If you used to exercise and just got out of the habit, maybe it would be easy for you to start exercising again. Or if you actually like cooking meals from scratch, maybe the first thing you should focus on is eating home-cooked meals that are low in sodium.  Whatever you tackle first, choose specific, realistic goals, and give yourself a deadline. For example, do not decide that you are going to "exercise more." Instead, decide that you are going to walk for 10 minutes on Monday, Wednesday, and Friday, and that you are going to do this for the next 2 weeks.  When lifestyle changes are too general, people have a hard time following through.  Now go. You can do it!  All topics are updated as new evidence becomes available and our peer review process is complete.  This topic retrieved from [employer] on: Sep 21, 2021.  Topic 06696 Version 8.0  Release: 29.4.2 - C29.263  © 2021 UpToDate, Inc. and/or its affiliates. All rights reserved.  figure 1: Sources of sodium in your diet     Graphic 95891 Version 2.0    Consumer Information Use and Disclaimer   This information is not specific medical advice and does not replace information you receive from your health care provider. This is only a brief summary of general information. It does NOT include all information about conditions, illnesses, injuries, tests, procedures, treatments, therapies, discharge instructions or life-style choices that may apply to you. You must talk with your health care provider for complete information about your health and treatment options. This information should not be used to decide whether or not to accept your health care provider's advice, instructions or recommendations. Only your health care provider has the knowledge and training to provide advice that is right for you. The use of this information is governed by the [employer] End User License Agreement, available at " "https://www.[employer].com/en/solutions/lexicomp/about/link.The use of [employer] content is governed by the [employer] Terms of Use. ©2021 UpToDate, Inc. All rights reserved.  Copyright   © 2021 UpToDate, Inc. and/or its affiliates. All rights reserved.  Patient Education       Diabetes and Diet   The Basics   Written by the doctors and editors at [employer]   Why is diet important in diabetes? -- Diet is important because it is part of diabetes treatment. Many people need to change what they eat and how much they eat to help treat their diabetes. It is important for people to treat their diabetes so that they:  Keep their blood sugar at or near a normal level  Prevent long-term problems, such as heart or kidney problems, that can happen in people with diabetes  Changing your diet can also help treat obesity, high blood pressure, and high cholesterol. These conditions can affect people with diabetes and can lead to future problems, such as heart attacks or strokes.  Who will work with me to change my diet? -- Your doctor or nurse will work with you to make a food plan to change your diet. They might also recommend that you work with a "dietitian." A dietitian is an expert on food and eating.  Do I need to eat at the same times every day? -- When and how often you should eat depends, in part, on the diabetes medicines you take. For example:  People who take about the same amount of insulin at the same time each day (called a "fixed regimen") should eat meals at the same times. This is also true for people who take pills that increase insulin levels, such as sulfonylureas. Eating meals at the same time every day helps prevent low blood sugar.  People who adjust the dose and timing of their insulin each day (called a "flexible regimen") do not always have to eat meals at the same time. That's because they can time their insulin dose for before they plan to eat, and also adjust the dose for how much they plan to " "eat.  People who take medicines that don't usually cause low blood sugar, such as metformin, don't have to eat meals at the same time every day.  What do I need to think about when planning what to eat? -- Our bodies break down the food we eat into small pieces called carbohydrates, proteins, and fats.  When planning what to eat, people with diabetes need to think about:  Carbohydrates (or "carbs") - Carbohydrates, which are sugars that our bodies use for energy, can raise a person's blood sugar level. Your doctor, nurse, or dietitian will tell you how many carbohydrates you should eat at each meal or snack. Foods that have carbohydrates include:  Bread, pasta, and rice  Vegetables and fruits  Dairy foods  Foods and drinks with added sugar  It is best to get your carbohydrates from fruits, vegetables, whole grains, and low-fat milk. It is best to avoid drinks with added sugar, like soda, juices, and sports drinks.   Protein - Your doctor, nurse, or dietitian will tell you how much protein you should eat each day. It is best to eat lean meats, fish, eggs, beans, peas, soy products, nuts, and seeds.  Fats - The type of fat you eat is more important than the amount of fat. "Saturated" and "trans" fats can increase your risk for heart problems, like a heart attack.  Foods that have saturated fats include meat, butter, cheese, and ice cream.  Foods that have trans fats include processed food with "partially hydrogenated oils" on the ingredient list. This may include fried foods, store bought cookies, muffins, pies, and cakes.  "Monounsaturated" and "polyunsaturated" fats are better for you. Foods with these types of fat include fish, avocado, olive oil, and nuts.  Calories - People need to eat a certain amount of calories each day to keep their weight the same. People who are overweight and want to lose weight need to eat fewer calories each day.  Fiber - Eating foods with a lot of fiber can help control a person's blood " sugar level. Foods that have a lot of fiber include apples, green beans, peas, beans, lentils, nuts, oatmeal, and whole grains.  Salt - People who have high blood pressure should not eat foods that contain a lot of salt (also called sodium). People with high blood pressure should also eat healthy foods, such as fruits, vegetables, and low-fat dairy foods.  Alcohol - Having more than 1 drink (for women) or 2 drinks (for men) a day can raise blood sugar levels. Also, drinks that have fruit juice or soda in them can raise blood sugar levels.  What can I do if I need to lose weight? -- If you need to lose weight, you can:  Exercise - Try to get at least 30 minutes of physical activity a day, most days of the week. Even gentle exercise, like walking, is good for your health. Some people with diabetes need to change their medicine dose before they exercise. They might also need to check their blood sugar levels before and after exercising.  Eat fewer calories - Your doctor, nurse, or dietitian can tell you how many calories you should eat each day in order to lose weight.  If you are worried about your weight, size, or shape, talk with your doctor, nurse, or dietitian. They can help you make changes to improve your health.  Can I eat the same foods as my family? -- Yes. You do not need to eat special foods if you have diabetes. You and your family can eat the same foods. Changing your diet is mostly about eating healthy foods and not eating too much.  What are the other parts of diabetes treatment? -- Besides changing your diet, the other parts of diabetes treatment are:  Exercise  Medicines  Some people with diabetes need to learn how to match their diet and exercise with their medicine dose. For example, people who use insulin might need to choose the dose of insulin they give themselves. To choose their dose, they need to think about:  What they plan to eat at the next meal  How much exercise they plan to do  What  their blood sugar level is  If the diet and exercise do not match the medicine dose, a person's blood sugar level can get too low or too high. Blood sugar levels that are too low or too high can cause problems.  All topics are updated as new evidence becomes available and our peer review process is complete.  This topic retrieved from [employer] on: Sep 21, 2021.  Topic 20049 Version 7.0  Release: 29.4.2 - C29.263  © 2021 UpToDate, Inc. and/or its affiliates. All rights reserved.  Consumer Information Use and Disclaimer   This information is not specific medical advice and does not replace information you receive from your health care provider. This is only a brief summary of general information. It does NOT include all information about conditions, illnesses, injuries, tests, procedures, treatments, therapies, discharge instructions or life-style choices that may apply to you. You must talk with your health care provider for complete information about your health and treatment options. This information should not be used to decide whether or not to accept your health care provider's advice, instructions or recommendations. Only your health care provider has the knowledge and training to provide advice that is right for you. The use of this information is governed by the [employer] End User License Agreement, available at https://www.[employer]/en/solutions/Taodangpu/about/link.The use of [employer] content is governed by the [employer] Terms of Use. ©2021 UpToDate, Inc. All rights reserved.  Copyright   © 2021 UpToDate, Inc. and/or its affiliates. All rights reserved.       Signature: Huey Mtz DNP, FNP-C

## 2022-10-27 NOTE — PROGRESS NOTES
"[facility]   Physical Therapy Treatment Note     Name: Papo Bell  Clinic Number: [id number]    Therapy Diagnosis:   Encounter Diagnosis   Name Primary?    Acute pain of right knee Yes     Physician: Collins Stahl MD    Visit Date: 10/27/2022    Physician Orders: PT Eval and Treat   Medical Diagnosis from Referral: R TKA   Evaluation Date: 9/12/2022  Authorization Period Expiration: 9/12/2023  Plan of Care Expiration: 10/14/2022  Visit # / Visits authorized: 7/12    PTA Visit #: 6/5    Time In: 9:03  Time Out: 10:00  Total Billable Time: 57 minutes     Precautions:  Standard     SUBJECTIVE     Pt reports: "I'm not having any pain, but my knee (R) still popping even when I am walking".     He was compliant with home exercise program.  Response to previous treatment: No adverse effects reported  Functional change: Improved ambulation    Pain: 0/10  Location: right knee      OBJECTIVE     Objective Measures updated at progress report unless specified.     Treatment     Papo received the treatments listed below:      THERAPEUTIC EXERCISES to develop strength, endurance, ROM, and flexibility.  :See flowsheet below: Increased resistance of LAQ's and SAQ's.     Ther-Ex  Reps          NuStep  10 minutes    Wedge  2 minutes    HS stretch  3 x 30"    Heel slides (seated)  30 x (122)    LAQs  3 x 10, 3#*   Hip adduction with ball  30 x 3"    HS curls  30 x green TB    SAQs  20 x 3" 3#    Heel Slides  30 x (123 degrees)    Quad Sets  30 x 3"    SLR  2 x 10, 2" *                THERAPEUTIC ACTIVITIES to improve functional and dynamic balance.  See flow-sheet below:     Ther-Act Reps   Heel Raises 3 x 10    Standing hip and knee flexion 3 x 10    3 way hip 2 x 10, Green *    Sit <> stands 2 x 15 *    Mini Squats  3 x 10 *    FSU  2 x 10, high step *    LSU  2 x 10, high step *    Standing TKE 2 x 10 red Thera Band        Patient Education and Home Exercises     Home Exercises Provided and " Patient Education Provided     Education provided:   -HEP, DOMS, POC     Written Home Exercises Provided: yes. Exercises were reviewed and Papo was able to demonstrate them prior to the end of the session.  Papo demonstrated good understanding of the education provided. See EMR under Patient Instructions for exercises provided during therapy sessions    ASSESSMENT     Papo is a 51 y.o. male referred to outpatient Physical Therapy with a medical diagnosis of R TKA. Pt presents with decreased R LE strength, R knee pain, and abnormal movement mechanics.  Initiated PT tx session with Nustep with adjustment to improve ROM and warm up.  Increased resistance and reps to improve Right Quad strength and motor control.  Patient requires cues to maintain static hold of Right LE during supine SLR's, and for proper alignment and hold times of standing 3 way hip with Thera Band.  Patient reports muscle fatigue without adverse effects noted at end of PT treatment session.     Papo Is progressing well towards his goals.   Pt prognosis is Good.     Pt will continue to benefit from skilled outpatient physical therapy to address the deficits listed in the problem list box on initial evaluation, provide pt/family education and to maximize pt's level of independence in the home and community environment.     Pt's spiritual, cultural and educational needs considered and pt agreeable to plan of care and goals.     Anticipated barriers to physical therapy: Compliance with HEP    Goals: (to be met by end of POC)   Pt will increase R knee flexion to R, knee extension to 0 degrees, and quad lag to 0 degrees to allow patient normal gait pattern.  Pt will increase R knee strength to 4/5 to allow patient to safely return to prior level of function   Pt will report decrease in pain to 2/10 to improve quality of life.  Patient will complete 5 x sit<> less than or equal to 12 seconds to demonstrate improved B LE power.   Patient  will independently ambulate community distances with a gait speed of greater than or equal to 3.25 feet per second    PLAN     Plan of care Certification: 9/12/2022 to 10/14/2022.     Outpatient Physical Therapy 3 times weekly for 4 weeks to include the following interventions: Electrical Stimulation: unattended, Gait Training, Manual Therapy, Moist Heat/ Ice, Patient Education, Therapeutic Activities, Therapeutic Exercise, and vasopneumatic device.      Continue POC per PT order to progress patient toward rehab goals as tolerated by patient.   BENJAMIN Garcia    10/27/2022

## 2022-10-27 NOTE — PATIENT INSTRUCTIONS
"Patient Education       Controlling Your Blood Pressure Through Lifestyle   The Basics   Written by the doctors and editors at [facility]   What does my lifestyle have to do with my blood pressure? -- The things you do and the foods you eat have a big effect on your blood pressure and your overall health. Following the right lifestyle can:  Lower your blood pressure or keep you from getting high blood pressure in the first place  Reduce your need for blood pressure medicines  Make medicines for high blood pressure work better, if you do take them  Lower the chances that you'll have a heart attack or stroke, or develop kidney disease  Which lifestyle choices will help lower my blood pressure? -- Here's what you can do:  Lose weight (if you are overweight)  Choose a diet rich in fruits, vegetables, and low-fat dairy products, and low in meats, sweets, and refined grains  Eat less salt (sodium)  Do something active for at least 30 minutes a day on most days of the week  Limit the amount of alcohol you drink  If you have high blood pressure, it's also very important to quit smoking (if you smoke). Quitting smoking might not bring your blood pressure down. But it will lower the chances that you'll have a heart attack or stroke, and it will help you feel better and live longer.  Start low and go slow -- The changes listed above might sound like a lot, but don't worry. You don't have to change everything all at once. The key to improving your lifestyle is to "start low and go slow." Choose 1 small, specific thing to change and try doing it for a while. If it works for you, keep doing it until it becomes a habit. If it doesn't, don't give up. Choose something else to change and see how that goes.  Let's say, for example, that you would like to improve your diet. If you're the type of person who eats cheeseburgers and French fries all the time, you can't switch to eating just salads from one day to the next. When people try to " "make changes like that, they often fail. Then they feel frustrated and tend to give up. So instead of trying to change everything about your diet in 1 day, change 1 or 2 small things about your diet and give yourself time to get used to those changes. For instance, keep the cheeseburger but give up the French fries. Or eat the same things but cut your portions in half.  As you find things that you are able to change and stick with, keep adding new changes. In time, you will see that you can actually change a lot. You just have to get used to the changes slowly.  Lose weight -- When people think about losing weight, they sometimes make it more complicated than it really is. To lose weight, you have to either eat less or move more. If you do both of those things, it's even better. But there is no single weight-loss diet or activity that's better than any other. When it comes to weight loss, the most effective plan is the one that you'll stick with.  Improve your diet -- There is no single diet that is right for everyone. But in general, a healthy diet can include:  Lots of fruits, vegetables, and whole grains  Some beans, peas, lentils, chickpeas, and similar foods  Some nuts, such as walnuts, almonds, and peanuts  Fat-free or low-fat milk and milk products  Some fish  To have a healthy diet, it's also important to limit or avoid sugar, sweets, meats, and refined grains. (Refined grains are found in white bread, white rice, most forms of pasta, and most packaged "snack" foods.)  Reduce salt -- Many people think that eating a low-sodium diet means avoiding the salt shaker and not adding salt when cooking. The truth is, not adding salt at the table or when you cook will only help a little. Almost all of the sodium you eat is already in the food you buy at the grocery store or at restaurants (figure 1).  The most important thing you can do to cut down on sodium is to eat less processed food. That means that you should " "avoid most foods that are sold in cans, boxes, jars, and bags. You should also eat in restaurants less often.  To reduce the amount of sodium you get, buy fresh or fresh-frozen fruits, vegetables, and meats. (Fresh-frozen foods have had nothing added to them before freezing.) Then you can make meals at home, from scratch, with these ingredients.  As with the other changes, don't try to cut out salt all at once. Instead, choose 1 or 2 foods that have a lot of sodium and try to replace them with low-sodium choices. When you get used to those low-sodium options, find another food or 2 to change. Then keep going, until all the foods you eat are sodium-free or low in sodium.  Become more active -- If you want to be more active, you don't have to go to the gym or get all sweaty. It is possible to increase your activity level while doing everyday things you enjoy. Walking, gardening, and dancing are just a few of the things that you might try. As with all the other changes, the key is not to do too much too fast. If you don't do any activity now, start by walking for just a few minutes every other day. Do that for a few weeks. If you stick with it, try doing it for longer. But if you find that you don't like walking, try a different activity.  Drink less alcohol -- If you are a woman, do not have more than 1 "standard drink" of alcohol a day. If you are a man, do not have more than 2. A "standard drink" is:  A can or bottle that has 12 ounces of beer  A glass that has 5 ounces of wine  A shot that has 1.5 ounces of whiskey  Where should I start? -- If you want to improve your lifestyle, start by making the changes that you think would be easiest for you. If you used to exercise and just got out of the habit, maybe it would be easy for you to start exercising again. Or if you actually like cooking meals from scratch, maybe the first thing you should focus on is eating home-cooked meals that are low in sodium.  Whatever you " "tackle first, choose specific, realistic goals, and give yourself a deadline. For example, do not decide that you are going to "exercise more." Instead, decide that you are going to walk for 10 minutes on Monday, Wednesday, and Friday, and that you are going to do this for the next 2 weeks.  When lifestyle changes are too general, people have a hard time following through.  Now go. You can do it!  All topics are updated as new evidence becomes available and our peer review process is complete.  This topic retrieved from [employer] on: Sep 21, 2021.  Topic 93824 Version 8.0  Release: 29.4.2 - C29.263  © 2021 UpToDate, Inc. and/or its affiliates. All rights reserved.  figure 1: Sources of sodium in your diet     Graphic 33764 Version 2.0    Consumer Information Use and Disclaimer   This information is not specific medical advice and does not replace information you receive from your health care provider. This is only a brief summary of general information. It does NOT include all information about conditions, illnesses, injuries, tests, procedures, treatments, therapies, discharge instructions or life-style choices that may apply to you. You must talk with your health care provider for complete information about your health and treatment options. This information should not be used to decide whether or not to accept your health care provider's advice, instructions or recommendations. Only your health care provider has the knowledge and training to provide advice that is right for you. The use of this information is governed by the [employer] End User License Agreement, available at https://www.Qinec.[employer]Brands/en/solutions/[employer]/about/link.The use of [employer] content is governed by the [employer] Terms of Use. ©2021 UpToDate, Inc. All rights reserved.  Copyright   © 2021 UpToDate, Inc. and/or its affiliates. All rights reserved.  Patient Education       Diabetes and Diet   The Basics   Written by the doctors and editors " "at [employer]   Why is diet important in diabetes? -- Diet is important because it is part of diabetes treatment. Many people need to change what they eat and how much they eat to help treat their diabetes. It is important for people to treat their diabetes so that they:  Keep their blood sugar at or near a normal level  Prevent long-term problems, such as heart or kidney problems, that can happen in people with diabetes  Changing your diet can also help treat obesity, high blood pressure, and high cholesterol. These conditions can affect people with diabetes and can lead to future problems, such as heart attacks or strokes.  Who will work with me to change my diet? -- Your doctor or nurse will work with you to make a food plan to change your diet. They might also recommend that you work with a "dietitian." A dietitian is an expert on food and eating.  Do I need to eat at the same times every day? -- When and how often you should eat depends, in part, on the diabetes medicines you take. For example:  People who take about the same amount of insulin at the same time each day (called a "fixed regimen") should eat meals at the same times. This is also true for people who take pills that increase insulin levels, such as sulfonylureas. Eating meals at the same time every day helps prevent low blood sugar.  People who adjust the dose and timing of their insulin each day (called a "flexible regimen") do not always have to eat meals at the same time. That's because they can time their insulin dose for before they plan to eat, and also adjust the dose for how much they plan to eat.  People who take medicines that don't usually cause low blood sugar, such as metformin, don't have to eat meals at the same time every day.  What do I need to think about when planning what to eat? -- Our bodies break down the food we eat into small pieces called carbohydrates, proteins, and fats.  When planning what to eat, people with diabetes " "need to think about:  Carbohydrates (or "carbs") - Carbohydrates, which are sugars that our bodies use for energy, can raise a person's blood sugar level. Your doctor, nurse, or dietitian will tell you how many carbohydrates you should eat at each meal or snack. Foods that have carbohydrates include:  Bread, pasta, and rice  Vegetables and fruits  Dairy foods  Foods and drinks with added sugar  It is best to get your carbohydrates from fruits, vegetables, whole grains, and low-fat milk. It is best to avoid drinks with added sugar, like soda, juices, and sports drinks.   Protein - Your doctor, nurse, or dietitian will tell you how much protein you should eat each day. It is best to eat lean meats, fish, eggs, beans, peas, soy products, nuts, and seeds.  Fats - The type of fat you eat is more important than the amount of fat. "Saturated" and "trans" fats can increase your risk for heart problems, like a heart attack.  Foods that have saturated fats include meat, butter, cheese, and ice cream.  Foods that have trans fats include processed food with "partially hydrogenated oils" on the ingredient list. This may include fried foods, store bought cookies, muffins, pies, and cakes.  "Monounsaturated" and "polyunsaturated" fats are better for you. Foods with these types of fat include fish, avocado, olive oil, and nuts.  Calories - People need to eat a certain amount of calories each day to keep their weight the same. People who are overweight and want to lose weight need to eat fewer calories each day.  Fiber - Eating foods with a lot of fiber can help control a person's blood sugar level. Foods that have a lot of fiber include apples, green beans, peas, beans, lentils, nuts, oatmeal, and whole grains.  Salt - People who have high blood pressure should not eat foods that contain a lot of salt (also called sodium). People with high blood pressure should also eat healthy foods, such as fruits, vegetables, and low-fat dairy " foods.  Alcohol - Having more than 1 drink (for women) or 2 drinks (for men) a day can raise blood sugar levels. Also, drinks that have fruit juice or soda in them can raise blood sugar levels.  What can I do if I need to lose weight? -- If you need to lose weight, you can:  Exercise - Try to get at least 30 minutes of physical activity a day, most days of the week. Even gentle exercise, like walking, is good for your health. Some people with diabetes need to change their medicine dose before they exercise. They might also need to check their blood sugar levels before and after exercising.  Eat fewer calories - Your doctor, nurse, or dietitian can tell you how many calories you should eat each day in order to lose weight.  If you are worried about your weight, size, or shape, talk with your doctor, nurse, or dietitian. They can help you make changes to improve your health.  Can I eat the same foods as my family? -- Yes. You do not need to eat special foods if you have diabetes. You and your family can eat the same foods. Changing your diet is mostly about eating healthy foods and not eating too much.  What are the other parts of diabetes treatment? -- Besides changing your diet, the other parts of diabetes treatment are:  Exercise  Medicines  Some people with diabetes need to learn how to match their diet and exercise with their medicine dose. For example, people who use insulin might need to choose the dose of insulin they give themselves. To choose their dose, they need to think about:  What they plan to eat at the next meal  How much exercise they plan to do  What their blood sugar level is  If the diet and exercise do not match the medicine dose, a person's blood sugar level can get too low or too high. Blood sugar levels that are too low or too high can cause problems.  All topics are updated as new evidence becomes available and our peer review process is complete.  This topic retrieved from [employer] on: Sep  21, 2021.  Topic 60063 Version 7.0  Release: 29.4.2 - C29.263  © 2021 UpToDate, Inc. and/or its affiliates. All rights reserved.  Consumer Information Use and Disclaimer   This information is not specific medical advice and does not replace information you receive from your health care provider. This is only a brief summary of general information. It does NOT include all information about conditions, illnesses, injuries, tests, procedures, treatments, therapies, discharge instructions or life-style choices that may apply to you. You must talk with your health care provider for complete information about your health and treatment options. This information should not be used to decide whether or not to accept your health care provider's advice, instructions or recommendations. Only your health care provider has the knowledge and training to provide advice that is right for you. The use of this information is governed by the [employer] End User License Agreement, available at https://www.[employer].Jobr/en/solutions/[employer]/about/link.The use of [employer] content is governed by the [employer] Terms of Use. ©2021 UpToDate, Inc. All rights reserved.  Copyright   © 2021 UpToDate, Inc. and/or its affiliates. All rights reserved.

## 2022-10-28 ENCOUNTER — DOCUMENTATION ONLY (OUTPATIENT)
Dept: REHABILITATION | Facility: HOSPITAL | Age: 52
End: 2022-10-28
Payer: COMMERCIAL

## 2022-10-28 NOTE — PROGRESS NOTES
Outpatient Therapy Discharge Summary     Name: Papo Bell  [facility] Number: [id number]     Therapy Diagnosis:        Encounter Diagnosis   Name Primary?    Acute pain of right knee Yes      Physician: Collins Stahl MD     Visit Date: 10/27/2022     Physician Orders: PT Eval and Treat   Medical Diagnosis from Referral: R TKA   Evaluation Date: 9/12/2022  Date of Last visit: 10/27/2022  Total Visits Received: 7  Cancelled Visits: 9  No Show Visits: 2    Assessment    Goals:  Pt will increase R knee flexion to R, knee extension to 0 degrees, and quad lag to 0 degrees to allow patient normal gait pattern.- MET   Pt will increase R knee strength to 4/5 to allow patient to safely return to prior level of function -NOT MET   Pt will report decrease in pain to 2/10 to improve quality of life.-MET   Patient will complete 5 x sit<> less than or equal to 12 seconds to demonstrate improved B LE power. -Unable to assess due to patient not returning to PT   Patient will independently ambulate community distances with a gait speed of greater than or equal to 3.25 feet per second-MET     Discharge reason: Other:  Patient called and reported that he is going to continue PT with the onsite PT at his job and requested DC from this facility due to scheduling conflicts.     Plan   This patient is discharged from Physical Therapy.   Nia Bell, PT, DPT

## 2022-10-30 LAB
UA COMPLETE W REFLEX CULTURE PNL UR: ABNORMAL
UA COMPLETE W REFLEX CULTURE PNL UR: ABNORMAL

## 2022-10-31 ENCOUNTER — TELEPHONE (OUTPATIENT)
Dept: PRIMARY CARE CLINIC | Facility: CLINIC | Age: 52
End: 2022-10-31
Payer: COMMERCIAL

## 2022-10-31 DIAGNOSIS — E87.6 HYPOKALEMIA: ICD-10-CM

## 2022-10-31 DIAGNOSIS — N39.0 URINARY TRACT INFECTION WITH HEMATURIA, SITE UNSPECIFIED: Primary | ICD-10-CM

## 2022-10-31 DIAGNOSIS — R31.9 URINARY TRACT INFECTION WITH HEMATURIA, SITE UNSPECIFIED: Primary | ICD-10-CM

## 2022-10-31 RX ORDER — POTASSIUM CHLORIDE 20 MEQ/1
20 TABLET, EXTENDED RELEASE ORAL DAILY
Qty: 30 TABLET | Refills: 5 | Status: SHIPPED | OUTPATIENT
Start: 2022-10-31 | End: 2023-09-19 | Stop reason: SDUPTHER

## 2022-10-31 RX ORDER — NITROFURANTOIN 25; 75 MG/1; MG/1
100 CAPSULE ORAL 2 TIMES DAILY
Qty: 14 CAPSULE | Refills: 0 | Status: SHIPPED | OUTPATIENT
Start: 2022-10-31 | End: 2022-11-07

## 2022-10-31 NOTE — TELEPHONE ENCOUNTER
----- Message from Huey Mtz DNP, FNP-C sent at 10/31/2022  3:44 PM CDT -----  Please notify patient of results:    Urine culture grew out 2 different organisms; he was started on cipro at last office visit. He can stop the Cipro and start the Nitrofurantoin, which is effective for both organisms. Med has been sent tot he pharmacy.

## 2022-10-31 NOTE — TELEPHONE ENCOUNTER
----- Message from Huey Mtz DNP, FNP-C sent at 10/31/2022  3:45 PM CDT -----  Patient's potassium is low; needs to take potassium 20 meq po twice a day for about 5 days, then go back to once a day. Ask him if he is taking the potassium.

## 2022-11-22 ENCOUNTER — TELEPHONE (OUTPATIENT)
Dept: UROLOGY | Facility: CLINIC | Age: 52
End: 2022-11-22
Payer: COMMERCIAL

## 2022-11-22 NOTE — TELEPHONE ENCOUNTER
I received another phone message that he had called several timnes and got answering machine.  His appointment had been cancelled.  He was doing what he ws supposed to do.  He is not going to pay any cancellation fee.  He is tired of it and this is just some bull.  Left his home number.  I called the home number 4 times and got no answer.  Phone rang 2 times each call, and I did not hear the pt say hello or anything.  I said hello multiple times and got no response.  Went to voice mail and I left him a message that I called multiple times.  I will call back in morning and will discuss what is happening about the cancellations.  NP will be leaving urology.  I do not know anything about any cancellation fee, and if urology cancelled his appointment I do not see why he will have to pay any cancellation fee.  I will try calling him back in morning.

## 2022-11-22 NOTE — TELEPHONE ENCOUNTER
I received a message that he called because his appointment had been cancelled with Tania Campbell and he was trying to find out what was going on.  I called him back and told him I had called from urology and would call later.

## 2023-01-26 ENCOUNTER — OFFICE VISIT (OUTPATIENT)
Dept: NEUROLOGY | Facility: CLINIC | Age: 53
End: 2023-01-26
Payer: COMMERCIAL

## 2023-01-26 VITALS
RESPIRATION RATE: 18 BRPM | SYSTOLIC BLOOD PRESSURE: 140 MMHG | WEIGHT: 282 LBS | BODY MASS INDEX: 42.74 KG/M2 | OXYGEN SATURATION: 99 % | DIASTOLIC BLOOD PRESSURE: 90 MMHG | HEIGHT: 68 IN | HEART RATE: 104 BPM

## 2023-01-26 DIAGNOSIS — G56.03 CARPAL TUNNEL SYNDROME ON BOTH SIDES: Primary | ICD-10-CM

## 2023-01-26 DIAGNOSIS — G56.21 ULNAR NERVE COMPRESSION, RIGHT: ICD-10-CM

## 2023-01-26 DIAGNOSIS — G56.22 ULNAR NERVE COMPRESSION, LEFT: ICD-10-CM

## 2023-01-26 PROCEDURE — 3008F BODY MASS INDEX DOCD: CPT | Mod: ,,, | Performed by: NURSE PRACTITIONER

## 2023-01-26 PROCEDURE — 1159F PR MEDICATION LIST DOCUMENTED IN MEDICAL RECORD: ICD-10-PCS | Mod: ,,, | Performed by: NURSE PRACTITIONER

## 2023-01-26 PROCEDURE — 3080F DIAST BP >= 90 MM HG: CPT | Mod: ,,, | Performed by: NURSE PRACTITIONER

## 2023-01-26 PROCEDURE — 3008F PR BODY MASS INDEX (BMI) DOCUMENTED: ICD-10-PCS | Mod: ,,, | Performed by: NURSE PRACTITIONER

## 2023-01-26 PROCEDURE — 3077F SYST BP >= 140 MM HG: CPT | Mod: ,,, | Performed by: NURSE PRACTITIONER

## 2023-01-26 PROCEDURE — 1160F PR REVIEW ALL MEDS BY PRESCRIBER/CLIN PHARMACIST DOCUMENTED: ICD-10-PCS | Mod: ,,, | Performed by: NURSE PRACTITIONER

## 2023-01-26 PROCEDURE — 99213 OFFICE O/P EST LOW 20 MIN: CPT | Mod: S$PBB,,, | Performed by: NURSE PRACTITIONER

## 2023-01-26 PROCEDURE — 99213 PR OFFICE/OUTPT VISIT, EST, LEVL III, 20-29 MIN: ICD-10-PCS | Mod: S$PBB,,, | Performed by: NURSE PRACTITIONER

## 2023-01-26 PROCEDURE — 1160F RVW MEDS BY RX/DR IN RCRD: CPT | Mod: ,,, | Performed by: NURSE PRACTITIONER

## 2023-01-26 PROCEDURE — 1159F MED LIST DOCD IN RCRD: CPT | Mod: ,,, | Performed by: NURSE PRACTITIONER

## 2023-01-26 PROCEDURE — 3077F PR MOST RECENT SYSTOLIC BLOOD PRESSURE >= 140 MM HG: ICD-10-PCS | Mod: ,,, | Performed by: NURSE PRACTITIONER

## 2023-01-26 PROCEDURE — 3080F PR MOST RECENT DIASTOLIC BLOOD PRESSURE >= 90 MM HG: ICD-10-PCS | Mod: ,,, | Performed by: NURSE PRACTITIONER

## 2023-01-26 PROCEDURE — 99215 OFFICE O/P EST HI 40 MIN: CPT | Mod: PBBFAC | Performed by: NURSE PRACTITIONER

## 2023-01-26 RX ORDER — GABAPENTIN 100 MG/1
100 CAPSULE ORAL 3 TIMES DAILY
Qty: 90 CAPSULE | Refills: 5 | Status: SHIPPED | OUTPATIENT
Start: 2023-01-26 | End: 2023-09-19 | Stop reason: SDUPTHER

## 2023-01-26 NOTE — PATIENT INSTRUCTIONS
1. Continue use of carpal tunnel braces as needed    2. Continue neurontin 100mg three times daily if symptoms again worsen

## 2023-01-26 NOTE — PROGRESS NOTES
Subjective:       Patient ID: Papo Bell is a 52 y.o. male     Chief Complaint:    Chief Complaint   Patient presents with    Follow-up    Carpal Tunnel        Allergies:  Patient has no known allergies.    Current Medications:    Outpatient Encounter Medications as of 1/26/2023   Medication Sig Dispense Refill    amLODIPine (NORVASC) 10 MG tablet TAKE 1 TABLET DAILY 90 tablet 1    aspirin (ECOTRIN) 81 MG EC tablet Take 81 mg by mouth 2 (two) times daily.      atorvastatin (LIPITOR) 20 MG tablet TAKE 1 TABLET EVERY EVENING 90 tablet 1    cetirizine (ZYRTEC) 10 MG tablet Take 1 tablet (10 mg total) by mouth once daily. 30 tablet 11    diclofenac (VOLTAREN) 75 MG EC tablet Take 75 mg by mouth 2 (two) times daily.      ergocalciferol (ERGOCALCIFEROL) 50,000 unit Cap Take 1 capsule by mouth weekly for 12 weeks then take 1 capsule monthly after that 12 capsule 2    esomeprazole (NEXIUM) 20 MG capsule Take 1 capsule (20 mg total) by mouth before breakfast. 30 capsule 11    mineral oil-hydrophil petrolat (AQUAPHOR) Oint Apply topically as needed (dry skin). 396 g 1    omeprazole (PRILOSEC) 40 MG capsule       potassium chloride SA (K-DUR,KLOR-CON) 20 MEQ tablet Take 1 tablet (20 mEq total) by mouth once daily. 30 tablet 5    tadalafiL (CIALIS) 5 MG tablet Take one tablet prn before sexual activity; effects may last 36 hours. Max dose is 20 mg/dose up to 1 dose/24 hours. 10 tablet 1    timolol maleate 0.5% (TIMOPTIC) 0.5 % Drop PUT 1 DROP INTO AFFECTED EYE(S) EVERY MORNING      [DISCONTINUED] gabapentin (NEURONTIN) 100 MG capsule Take 1 capsule (100 mg total) by mouth 3 (three) times daily. 90 capsule 5    gabapentin (NEURONTIN) 100 MG capsule Take 1 capsule (100 mg total) by mouth 3 (three) times daily. 90 capsule 5    hydroCHLOROthiazide (MICROZIDE) 12.5 mg capsule       HYDROcodone-acetaminophen (NORCO) 5-325 mg per tablet Take 1 tablet by mouth 3 (three) times daily.      metFORMIN (GLUCOPHAGE) 500 MG tablet TAKE  1 TABLET DAILY WITH   BREAKFAST (Patient not taking: Reported on 1/26/2023) 90 tablet 1    omega 3-dha-epa-fish oil (FISH OIL) 1,000 mg (120 mg-180 mg) Cap Take 1 capsule by mouth once daily. 90 capsule 3     No facility-administered encounter medications on file as of 1/26/2023.       History of Present Illness  53 y/o AAM following in clinic for finding of bilateral carpal tunnel syndrome.    Has peripheral vascular disease, followed by Dr. Canada.    EMG/NCS done 11/15/21 showed moderately severe left and mild right carpal tunnel.  There was also noted mild to moderate bilateral ulnar nerve neuropathies as well.  He had release on the LUE done in early March of 2022.  I see the initial followup with Dr. Crocker but no followup since that time.  He reports the RUE is not giving him a lot of trouble so he is holding on surgery at this time.    He is prescribed neurontin 100mg tid to take, however he ran out of it about a week ago, but reports today no worsening in symptoms thus far.  I recommend he hold on restarting it, if symptoms worsen then it is fine to restart it as directed.         Review of Systems  Review of Systems   Constitutional:  Negative for diaphoresis and fever.   HENT:  Negative for congestion, hearing loss and tinnitus.    Eyes:  Negative for blurred vision, double vision, photophobia, discharge and redness.   Respiratory:  Negative for cough and shortness of breath.    Cardiovascular:  Negative for chest pain.   Gastrointestinal:  Negative for abdominal pain, nausea and vomiting.   Musculoskeletal:  Negative for back pain, joint pain, myalgias and neck pain.   Skin:  Negative for itching and rash.   Neurological:  Positive for tingling. Negative for dizziness, tremors, sensory change, speech change, focal weakness, seizures, loss of consciousness, weakness and headaches.   Psychiatric/Behavioral:  Negative for depression, hallucinations and memory loss. The patient does not have insomnia.    All  other systems reviewed and are negative.   Objective:     NEUROLOGICAL EXAMINATION:     MENTAL STATUS   Oriented to person, place, and time.   Registration: recalls 3 of 3 objects. Recall at 5 minutes: recalls 3 of 3 objects.   Attention: normal. Concentration: normal.   Speech: speech is normal   Level of consciousness: alert  Knowledge: good and consistent with education.   Normal comprehension.     CRANIAL NERVES     CN II   Visual fields full to confrontation.   Visual acuity: normal  Right visual field deficit: none  Left visual field deficit: none     CN III, IV, VI   Pupils are equal, round, and reactive to light.  Extraocular motions are normal.   Right pupil: Size: 3 mm. Shape: regular. Reactivity: brisk. Consensual response: intact. Accommodation: intact.   Left pupil: Size: 3 mm. Shape: regular. Reactivity: brisk. Consensual response: intact. Accommodation: intact.   CN III: no CN III palsy  CN VI: no CN VI palsy  Nystagmus: none   Diplopia: none  Upgaze: normal  Downgaze: normal  Conjugate gaze: present  Vestibulo-ocular reflex: present    CN V   Facial sensation intact.   Right facial sensation deficit: none  Left facial sensation deficit: none  Right corneal reflex: normal  Left corneal reflex: normal    CN VII   Facial expression full, symmetric.   Right facial weakness: none  Left facial weakness: none  Right taste: normal  Left taste: normal    CN VIII   CN VIII normal.   Hearing: intact    CN IX, X   CN IX normal.   CN X normal.   Palate: symmetric    CN XI   CN XI normal.   Right sternocleidomastoid strength: normal  Left sternocleidomastoid strength: normal  Right trapezius strength: normal  Left trapezius strength: normal    CN XII   CN XII normal.   Tongue: not atrophic  Fasciculations: absent  Tongue deviation: none    MOTOR EXAM   Muscle bulk: normal  Overall muscle tone: normal  Right arm tone: normal  Left arm tone: normal  Right arm pronator drift: absent  Left arm pronator drift:  absent  Right leg tone: normal  Left leg tone: normal    Strength   Right neck flexion: 5/5  Left neck flexion: 5/5  Right neck extension: 5/5  Left neck extension: 5/5  Right deltoid: 5/5  Left deltoid: 5/5  Right biceps: 5/5  Left biceps: 5/5  Right triceps: 5/5  Left triceps: 5/5  Right wrist flexion: 5/5  Left wrist flexion: 5/5  Right wrist extension: 5/5  Left wrist extension: 5/5  Right interossei: 55  Left interossei: 5/5  Right iliopsoas: 5/5  Left iliopsoas:   Right quadriceps:   Left quadriceps:   Right hamstrin/5  Left hamstrin/5  Right anterior tibial:   Left anterior tibial:   Right posterior tibial:   Left posterior tibial:   Right gastroc:   Left gastroc: 5/    REFLEXES     Reflexes   Right brachioradialis: 2+  Left brachioradialis: 2+  Right biceps: 2+  Left biceps: 2+  Right triceps: 2+  Left triceps: 2+  Right patellar: 2+  Left patellar: 2+  Right achilles: 2+  Left achilles: 2+  Right plantar: normal  Left plantar: normal  Right Moss: absent  Left Moss: absent  Right ankle clonus: absent  Left ankle clonus: absent  Right pendular knee jerk: absent  Left pendular knee jerk: absent    SENSORY EXAM   Light touch normal.   Right arm light touch: normal  Left arm light touch: normal  Right leg light touch: normal  Left leg light touch: normal  Vibration normal.   Right arm vibration: normal  Left arm vibration: normal  Right leg vibration: normal  Left leg vibration: normal  Proprioception normal.   Right arm proprioception: normal  Left arm proprioception: normal  Right leg proprioception: normal  Left leg proprioception: normal  Pinprick normal.   Right arm pinprick: normal  Left arm pinprick: normal  Right leg pinprick: normal  Left leg pinprick: normal  Graphesthesia: normal  Romberg: negative  Stereognosis: normal    GAIT AND COORDINATION     Gait  Gait: normal     Coordination   Finger to nose coordination: normal  Heel to shin coordination:  normal  Tandem walking coordination: normal    Tremor   Resting tremor: absent  Intention tremor: absent  Action tremor: absent     Physical Exam  Vitals and nursing note reviewed.   Constitutional:       Appearance: Normal appearance.   HENT:      Head: Normocephalic.   Eyes:      Extraocular Movements: EOM normal.      Pupils: Pupils are equal, round, and reactive to light.   Cardiovascular:      Rate and Rhythm: Normal rate and regular rhythm.      Pulses: Normal pulses.      Heart sounds: Normal heart sounds.   Pulmonary:      Effort: Pulmonary effort is normal.      Breath sounds: Normal breath sounds.   Musculoskeletal:         General: Normal range of motion.      Cervical back: Normal range of motion and neck supple.   Skin:     General: Skin is warm and dry.   Neurological:      General: No focal deficit present.      Mental Status: He is alert and oriented to person, place, and time.      Cranial Nerves: No cranial nerve deficit.      Sensory: No sensory deficit.      Motor: No weakness.      Coordination: Coordination normal. Finger-Nose-Finger Test, Heel to Shin Test and Romberg Test normal.      Gait: Gait is intact. Gait and tandem walk normal.      Deep Tendon Reflexes: Reflexes normal.      Reflex Scores:       Tricep reflexes are 2+ on the right side and 2+ on the left side.       Bicep reflexes are 2+ on the right side and 2+ on the left side.       Brachioradialis reflexes are 2+ on the right side and 2+ on the left side.       Patellar reflexes are 2+ on the right side and 2+ on the left side.       Achilles reflexes are 2+ on the right side and 2+ on the left side.  Psychiatric:         Mood and Affect: Mood normal.         Speech: Speech normal.         Behavior: Behavior normal.        Assessment:     Problem List Items Addressed This Visit          Neuro    Carpal tunnel syndrome on both sides - Primary    Relevant Medications    gabapentin (NEURONTIN) 100 MG capsule    Ulnar nerve  compression, left    Relevant Medications    gabapentin (NEURONTIN) 100 MG capsule    Ulnar nerve compression, right    Relevant Medications    gabapentin (NEURONTIN) 100 MG capsule          Primary Diagnosis and ICD10  Carpal tunnel syndrome on both sides [G56.03]    Plan:     Patient Instructions   1. Continue use of carpal tunnel braces as needed    2. Continue neurontin 100mg three times daily if symptoms again worsen    Medications Discontinued During This Encounter   Medication Reason    gabapentin (NEURONTIN) 100 MG capsule Reorder         Requested Prescriptions     Signed Prescriptions Disp Refills    gabapentin (NEURONTIN) 100 MG capsule 90 capsule 5     Sig: Take 1 capsule (100 mg total) by mouth 3 (three) times daily.       No orders of the defined types were placed in this encounter.

## 2023-01-30 ENCOUNTER — OFFICE VISIT (OUTPATIENT)
Dept: PRIMARY CARE CLINIC | Facility: CLINIC | Age: 53
End: 2023-01-30
Payer: COMMERCIAL

## 2023-01-30 VITALS
BODY MASS INDEX: 44.71 KG/M2 | WEIGHT: 295 LBS | DIASTOLIC BLOOD PRESSURE: 71 MMHG | HEART RATE: 90 BPM | RESPIRATION RATE: 20 BRPM | HEIGHT: 68 IN | TEMPERATURE: 98 F | SYSTOLIC BLOOD PRESSURE: 136 MMHG | OXYGEN SATURATION: 100 %

## 2023-01-30 DIAGNOSIS — M79.605 LEG PAIN, BILATERAL: ICD-10-CM

## 2023-01-30 DIAGNOSIS — I87.2 VENOUS INSUFFICIENCY: ICD-10-CM

## 2023-01-30 DIAGNOSIS — R20.0 NUMBNESS AND TINGLING OF BOTH FEET: ICD-10-CM

## 2023-01-30 DIAGNOSIS — I10 PRIMARY HYPERTENSION: Primary | ICD-10-CM

## 2023-01-30 DIAGNOSIS — E78.49 OTHER HYPERLIPIDEMIA: ICD-10-CM

## 2023-01-30 DIAGNOSIS — R20.2 NUMBNESS AND TINGLING OF BOTH FEET: ICD-10-CM

## 2023-01-30 DIAGNOSIS — M79.604 LEG PAIN, BILATERAL: ICD-10-CM

## 2023-01-30 LAB
ALBUMIN SERPL BCP-MCNC: 3.3 G/DL (ref 3.5–5)
ALBUMIN/GLOB SERPL: 0.7 {RATIO}
ALP SERPL-CCNC: 79 U/L (ref 45–115)
ALT SERPL W P-5'-P-CCNC: 43 U/L (ref 16–61)
ANION GAP SERPL CALCULATED.3IONS-SCNC: 8 MMOL/L (ref 7–16)
ANISOCYTOSIS BLD QL SMEAR: NORMAL
AST SERPL W P-5'-P-CCNC: 46 U/L (ref 15–37)
BASOPHILS # BLD AUTO: 0.03 K/UL (ref 0–0.2)
BASOPHILS NFR BLD AUTO: 0.6 % (ref 0–1)
BILIRUB SERPL-MCNC: 0.5 MG/DL (ref ?–1.2)
BUN SERPL-MCNC: 18 MG/DL (ref 7–18)
BUN/CREAT SERPL: 18 (ref 6–20)
CALCIUM SERPL-MCNC: 8.9 MG/DL (ref 8.5–10.1)
CHLORIDE SERPL-SCNC: 105 MMOL/L (ref 98–107)
CHOLEST SERPL-MCNC: 131 MG/DL (ref 0–200)
CHOLEST/HDLC SERPL: 2.1 {RATIO}
CO2 SERPL-SCNC: 30 MMOL/L (ref 21–32)
CREAT SERPL-MCNC: 0.98 MG/DL (ref 0.7–1.3)
DIFFERENTIAL METHOD BLD: ABNORMAL
EGFR (NO RACE VARIABLE) (RUSH/TITUS): 92 ML/MIN/1.73M²
EOSINOPHIL # BLD AUTO: 0.04 K/UL (ref 0–0.5)
EOSINOPHIL NFR BLD AUTO: 0.8 % (ref 1–4)
ERYTHROCYTE [DISTWIDTH] IN BLOOD BY AUTOMATED COUNT: 14.8 % (ref 11.5–14.5)
GLOBULIN SER-MCNC: 4.5 G/DL (ref 2–4)
GLUCOSE SERPL-MCNC: 95 MG/DL (ref 74–106)
HCT VFR BLD AUTO: 36 % (ref 40–54)
HDLC SERPL-MCNC: 63 MG/DL (ref 40–60)
HGB BLD-MCNC: 12.3 G/DL (ref 13.5–18)
IMM GRANULOCYTES # BLD AUTO: 0.04 K/UL (ref 0–0.04)
IMM GRANULOCYTES NFR BLD: 0.8 % (ref 0–0.4)
LDLC SERPL CALC-MCNC: 42 MG/DL
LYMPHOCYTES # BLD AUTO: 2.04 K/UL (ref 1–4.8)
LYMPHOCYTES NFR BLD AUTO: 39.1 % (ref 27–41)
MCH RBC QN AUTO: 36.5 PG (ref 27–31)
MCHC RBC AUTO-ENTMCNC: 34.2 G/DL (ref 32–36)
MCV RBC AUTO: 106.8 FL (ref 80–96)
MONOCYTES # BLD AUTO: 0.44 K/UL (ref 0–0.8)
MONOCYTES NFR BLD AUTO: 8.4 % (ref 2–6)
MPC BLD CALC-MCNC: 10.2 FL (ref 9.4–12.4)
NEUTROPHILS # BLD AUTO: 2.63 K/UL (ref 1.8–7.7)
NEUTROPHILS NFR BLD AUTO: 50.3 % (ref 53–65)
NONHDLC SERPL-MCNC: 68 MG/DL
NRBC # BLD AUTO: 0 X10E3/UL
NRBC, AUTO (.00): 0 %
PLATELET # BLD AUTO: 142 K/UL (ref 150–400)
PLATELET MORPHOLOGY: NORMAL
POTASSIUM SERPL-SCNC: 3.4 MMOL/L (ref 3.5–5.1)
PROT SERPL-MCNC: 7.8 G/DL (ref 6.4–8.2)
RBC # BLD AUTO: 3.37 M/UL (ref 4.6–6.2)
SODIUM SERPL-SCNC: 140 MMOL/L (ref 136–145)
TRIGL SERPL-MCNC: 129 MG/DL (ref 35–150)
VLDLC SERPL-MCNC: 26 MG/DL
WBC # BLD AUTO: 5.22 K/UL (ref 4.5–11)

## 2023-01-30 PROCEDURE — 3075F PR MOST RECENT SYSTOLIC BLOOD PRESS GE 130-139MM HG: ICD-10-PCS | Mod: CPTII,,, | Performed by: NURSE PRACTITIONER

## 2023-01-30 PROCEDURE — 80053 COMPREHEN METABOLIC PANEL: CPT | Mod: ,,, | Performed by: CLINICAL MEDICAL LABORATORY

## 2023-01-30 PROCEDURE — 99213 PR OFFICE/OUTPT VISIT, EST, LEVL III, 20-29 MIN: ICD-10-PCS | Mod: ,,, | Performed by: NURSE PRACTITIONER

## 2023-01-30 PROCEDURE — 3078F PR MOST RECENT DIASTOLIC BLOOD PRESSURE < 80 MM HG: ICD-10-PCS | Mod: CPTII,,, | Performed by: NURSE PRACTITIONER

## 2023-01-30 PROCEDURE — 1159F MED LIST DOCD IN RCRD: CPT | Mod: CPTII,,, | Performed by: NURSE PRACTITIONER

## 2023-01-30 PROCEDURE — 85025 CBC WITH DIFFERENTIAL: ICD-10-PCS | Mod: ,,, | Performed by: CLINICAL MEDICAL LABORATORY

## 2023-01-30 PROCEDURE — 3075F SYST BP GE 130 - 139MM HG: CPT | Mod: CPTII,,, | Performed by: NURSE PRACTITIONER

## 2023-01-30 PROCEDURE — 1159F PR MEDICATION LIST DOCUMENTED IN MEDICAL RECORD: ICD-10-PCS | Mod: CPTII,,, | Performed by: NURSE PRACTITIONER

## 2023-01-30 PROCEDURE — 3078F DIAST BP <80 MM HG: CPT | Mod: CPTII,,, | Performed by: NURSE PRACTITIONER

## 2023-01-30 PROCEDURE — 3008F BODY MASS INDEX DOCD: CPT | Mod: CPTII,,, | Performed by: NURSE PRACTITIONER

## 2023-01-30 PROCEDURE — 80061 LIPID PANEL: ICD-10-PCS | Mod: ,,, | Performed by: CLINICAL MEDICAL LABORATORY

## 2023-01-30 PROCEDURE — 85025 COMPLETE CBC W/AUTO DIFF WBC: CPT | Mod: ,,, | Performed by: CLINICAL MEDICAL LABORATORY

## 2023-01-30 PROCEDURE — 80053 COMPREHENSIVE METABOLIC PANEL: ICD-10-PCS | Mod: ,,, | Performed by: CLINICAL MEDICAL LABORATORY

## 2023-01-30 PROCEDURE — 99213 OFFICE O/P EST LOW 20 MIN: CPT | Mod: ,,, | Performed by: NURSE PRACTITIONER

## 2023-01-30 PROCEDURE — 80061 LIPID PANEL: CPT | Mod: ,,, | Performed by: CLINICAL MEDICAL LABORATORY

## 2023-01-30 PROCEDURE — 3008F PR BODY MASS INDEX (BMI) DOCUMENTED: ICD-10-PCS | Mod: CPTII,,, | Performed by: NURSE PRACTITIONER

## 2023-01-30 RX ORDER — ASPIRIN 81 MG/1
81 TABLET ORAL 2 TIMES DAILY
Qty: 90 TABLET | Refills: 2 | Status: SHIPPED | OUTPATIENT
Start: 2023-01-30

## 2023-01-30 NOTE — PROGRESS NOTES
[facility]  Primary Care       PATIENT NAME: Papo Bell   : 1970    AGE: 52 y.o. DATE: 2023    MRN: [id number]        Reason for Visit / Chief Complaint:  Follow-up, Diabetes (Pt states no taking diabetic medication.), Hypertension, and Hyperlipidemia     Subjective:     HPI: Patient here for routine check up; states he was once told he had prediabetes and was started on metformin but states he has not taken the metformin.     Patient states he has been having tingling to both feet along with heaviness to legs; has seen vascular surgery in the past; states he wears compression socks.     Follow-up  Pertinent negatives include no chest pain, coughing, fever, headaches or rash.   Diabetes  Pertinent negatives for hypoglycemia include no headaches. Pertinent negatives for diabetes include no chest pain.   Hypertension  Pertinent negatives include no chest pain, headaches or shortness of breath.   Hyperlipidemia  Pertinent negatives include no chest pain or shortness of breath.        Review of Systems: Review of Systems   Constitutional:  Negative for fever.   Respiratory:  Negative for cough and shortness of breath.    Cardiovascular:  Negative for chest pain.   Genitourinary:  Negative for dysuria.   Musculoskeletal:  Negative for gait problem.   Skin:  Negative for rash.   Neurological:  Negative for headaches.        Tingling to feet        Review of patient's allergies indicates:  No Known Allergies     Med List:  Current Outpatient Medications on File Prior to Visit   Medication Sig Dispense Refill    amLODIPine (NORVASC) 10 MG tablet TAKE 1 TABLET DAILY 90 tablet 1    atorvastatin (LIPITOR) 20 MG tablet TAKE 1 TABLET EVERY EVENING 90 tablet 1    diclofenac (VOLTAREN) 75 MG EC tablet Take 75 mg by mouth 2 (two) times daily.      esomeprazole (NEXIUM) 20 MG capsule Take 1 capsule (20 mg total) by mouth before breakfast. 30 capsule 11    gabapentin (NEURONTIN) 100 MG capsule Take 1  capsule (100 mg total) by mouth 3 (three) times daily. 90 capsule 5    hydroCHLOROthiazide (MICROZIDE) 12.5 mg capsule       HYDROcodone-acetaminophen (NORCO) 5-325 mg per tablet Take 1 tablet by mouth 3 (three) times daily.      mineral oil-hydrophil petrolat (AQUAPHOR) Oint Apply topically as needed (dry skin). 396 g 1    potassium chloride SA (K-DUR,KLOR-CON) 20 MEQ tablet Take 1 tablet (20 mEq total) by mouth once daily. 30 tablet 5    tadalafiL (CIALIS) 5 MG tablet Take one tablet prn before sexual activity; effects may last 36 hours. Max dose is 20 mg/dose up to 1 dose/24 hours. 10 tablet 1    timolol maleate 0.5% (TIMOPTIC) 0.5 % Drop PUT 1 DROP INTO AFFECTED EYE(S) EVERY MORNING      [DISCONTINUED] aspirin (ECOTRIN) 81 MG EC tablet Take 81 mg by mouth 2 (two) times daily.      cetirizine (ZYRTEC) 10 MG tablet Take 1 tablet (10 mg total) by mouth once daily. (Patient not taking: Reported on 1/30/2023) 30 tablet 11    ergocalciferol (ERGOCALCIFEROL) 50,000 unit Cap Take 1 capsule by mouth weekly for 12 weeks then take 1 capsule monthly after that (Patient not taking: Reported on 1/30/2023) 12 capsule 2    metFORMIN (GLUCOPHAGE) 500 MG tablet TAKE 1 TABLET DAILY WITH   BREAKFAST (Patient not taking: Reported on 1/26/2023) 90 tablet 1    omega 3-dha-epa-fish oil (FISH OIL) 1,000 mg (120 mg-180 mg) Cap Take 1 capsule by mouth once daily. 90 capsule 3    omeprazole (PRILOSEC) 40 MG capsule        No current facility-administered medications on file prior to visit.       Medical/Social/Family History:  Past Medical History:   Diagnosis Date    BMI 45.0-49.9, adult 06/20/2018    Cervicalgia 04/07/2021    Diabetes mellitus, type 2     DJD (degenerative joint disease) 03/15/2019    Edema 01/27/2021    Essential hypertension 04/07/2021    GERD (gastroesophageal reflux disease) 01/13/2021    Hyperlipidemia     Hypertension     Low back pain 06/19/2019    Osteoporosis     Pain in right shoulder 04/07/2021    Tachycardia  "04/07/2021      Social History     Tobacco Use   Smoking Status Never   Smokeless Tobacco Never      Social History     Substance and Sexual Activity   Alcohol Use Yes       Family History   Problem Relation Age of Onset    Diabetes Mother         AGE 78    Diabetes Father         AGE 54    Hypertension Father     No Known Problems Brother         AGE 55    No Known Problems Brother         AGE 49    Hypertension Brother         AGE 47    No Known Problems Daughter     No Known Problems Daughter     No Known Problems Daughter     No Known Problems Son     No Known Problems Son       Past Surgical History:   Procedure Laterality Date    CARPAL TUNNEL RELEASE Left 2/17/2022    Procedure: RELEASE, CARPAL TUNNEL;  Surgeon: Wilber Crocker MD;  Location: [facility] OR;  Service: Orthopedics;  Laterality: Left;    PATELLA TENDONS REPAIR Bilateral     RADIOFREQUENCY ABLATION Left 2/11/2022    Procedure: Left GSV RF Ablation;  Surgeon: Collins Canada DO;  Location: [facility] OR;  Service: Vascular;  Laterality: Left;    TORN MENISCUS Left     ULNAR NERVE TRANSPOSITION Left 2/17/2022    Procedure: TRANSPOSITION, NERVE, ULNAR;  Surgeon: Wilber Crocker MD;  Location: [facility] OR;  Service: Orthopedics;  Laterality: Left;    WRIST FRACTURE SURGERY Left         There is no immunization history on file for this patient.       Objective:      Vitals:    01/30/23 1449   BP: 136/71   BP Location: Left arm   Patient Position: Sitting   BP Method: Large (Automatic)   Pulse: 90   Resp: 20   Temp: 98.3 °F (36.8 °C)   TempSrc: Oral   SpO2: 100%   Weight: 133.8 kg (295 lb)   Height: 5' 8" (1.727 m)     Body mass index is 44.85 kg/m².     Physical Exam: Physical Exam  Constitutional:       General: He is not in acute distress.     Appearance: Normal appearance. He is obese. He is not ill-appearing.   HENT:      Head: Normocephalic.      Mouth/Throat:      Mouth: Mucous membranes are moist.   Eyes:      Extraocular Movements: " Extraocular movements intact.      Conjunctiva/sclera: Conjunctivae normal.      Pupils: Pupils are equal, round, and reactive to light.   Cardiovascular:      Rate and Rhythm: Normal rate and regular rhythm.      Heart sounds: Normal heart sounds.   Pulmonary:      Effort: Pulmonary effort is normal. No respiratory distress.      Breath sounds: Normal breath sounds. No wheezing or rhonchi.   Musculoskeletal:         General: Normal range of motion.      Cervical back: Normal range of motion and neck supple.   Skin:     General: Skin is warm and dry.   Neurological:      Mental Status: He is alert and oriented to person, place, and time.      Gait: Gait normal.   Psychiatric:         Mood and Affect: Mood normal.         Behavior: Behavior normal.              Assessment:          ICD-10-CM ICD-9-CM   1. Primary hypertension  I10 401.9   2. Other hyperlipidemia  E78.49 272.4   3. Leg pain, bilateral  M79.604 729.5    M79.605    4. Venous insufficiency  I87.2 459.81   5. Numbness and tingling of both feet  R20.0 782.0    R20.2         Plan:       Primary hypertension  -     Comprehensive Metabolic Panel; Future; Expected date: 01/30/2023  -     CBC Auto Differential  -     Lipid Panel; Future; Expected date: 01/30/2023  -     aspirin (ECOTRIN) 81 MG EC tablet; Take 1 tablet (81 mg total) by mouth 2 (two) times daily.  Dispense: 90 tablet; Refill: 2    Other hyperlipidemia  -     aspirin (ECOTRIN) 81 MG EC tablet; Take 1 tablet (81 mg total) by mouth 2 (two) times daily.  Dispense: 90 tablet; Refill: 2    Leg pain, bilateral  -     Ambulatory referral/consult to Vascular Surgery; Future; Expected date: 02/06/2023    Venous insufficiency  -     Ambulatory referral/consult to Vascular Surgery; Future; Expected date: 02/06/2023    Numbness and tingling of both feet  -     Ambulatory referral/consult to Vascular Surgery; Future; Expected date: 02/06/2023          New & refilled meds:  Requested Prescriptions     Signed  "Prescriptions Disp Refills    aspirin (ECOTRIN) 81 MG EC tablet 90 tablet 2     Sig: Take 1 tablet (81 mg total) by mouth 2 (two) times daily.       Follow up in about 3 months (around 4/30/2023), or if symptoms worsen or fail to improve, for Hypertension.     Patient Instructions   Patient Education       Controlling Your Blood Pressure Through Lifestyle   The Basics   Written by the doctors and editors at [facility]   What does my lifestyle have to do with my blood pressure? -- The things you do and the foods you eat have a big effect on your blood pressure and your overall health. Following the right lifestyle can:  Lower your blood pressure or keep you from getting high blood pressure in the first place  Reduce your need for blood pressure medicines  Make medicines for high blood pressure work better, if you do take them  Lower the chances that you'll have a heart attack or stroke, or develop kidney disease  Which lifestyle choices will help lower my blood pressure? -- Here's what you can do:  Lose weight (if you are overweight)  Choose a diet rich in fruits, vegetables, and low-fat dairy products, and low in meats, sweets, and refined grains  Eat less salt (sodium)  Do something active for at least 30 minutes a day on most days of the week  Limit the amount of alcohol you drink  If you have high blood pressure, it's also very important to quit smoking (if you smoke). Quitting smoking might not bring your blood pressure down. But it will lower the chances that you'll have a heart attack or stroke, and it will help you feel better and live longer.  Start low and go slow -- The changes listed above might sound like a lot, but don't worry. You don't have to change everything all at once. The key to improving your lifestyle is to "start low and go slow." Choose 1 small, specific thing to change and try doing it for a while. If it works for you, keep doing it until it becomes a habit. If it doesn't, don't give up. " "Choose something else to change and see how that goes.  Let's say, for example, that you would like to improve your diet. If you're the type of person who eats cheeseburgers and French fries all the time, you can't switch to eating just salads from one day to the next. When people try to make changes like that, they often fail. Then they feel frustrated and tend to give up. So instead of trying to change everything about your diet in 1 day, change 1 or 2 small things about your diet and give yourself time to get used to those changes. For instance, keep the cheeseburger but give up the French fries. Or eat the same things but cut your portions in half.  As you find things that you are able to change and stick with, keep adding new changes. In time, you will see that you can actually change a lot. You just have to get used to the changes slowly.  Lose weight -- When people think about losing weight, they sometimes make it more complicated than it really is. To lose weight, you have to either eat less or move more. If you do both of those things, it's even better. But there is no single weight-loss diet or activity that's better than any other. When it comes to weight loss, the most effective plan is the one that you'll stick with.  Improve your diet -- There is no single diet that is right for everyone. But in general, a healthy diet can include:  Lots of fruits, vegetables, and whole grains  Some beans, peas, lentils, chickpeas, and similar foods  Some nuts, such as walnuts, almonds, and peanuts  Fat-free or low-fat milk and milk products  Some fish  To have a healthy diet, it's also important to limit or avoid sugar, sweets, meats, and refined grains. (Refined grains are found in white bread, white rice, most forms of pasta, and most packaged "snack" foods.)  Reduce salt -- Many people think that eating a low-sodium diet means avoiding the salt shaker and not adding salt when cooking. The truth is, not adding salt " "at the table or when you cook will only help a little. Almost all of the sodium you eat is already in the food you buy at the grocery store or at restaurants (figure 1).  The most important thing you can do to cut down on sodium is to eat less processed food. That means that you should avoid most foods that are sold in cans, boxes, jars, and bags. You should also eat in restaurants less often.  To reduce the amount of sodium you get, buy fresh or fresh-frozen fruits, vegetables, and meats. (Fresh-frozen foods have had nothing added to them before freezing.) Then you can make meals at home, from scratch, with these ingredients.  As with the other changes, don't try to cut out salt all at once. Instead, choose 1 or 2 foods that have a lot of sodium and try to replace them with low-sodium choices. When you get used to those low-sodium options, find another food or 2 to change. Then keep going, until all the foods you eat are sodium-free or low in sodium.  Become more active -- If you want to be more active, you don't have to go to the gym or get all sweaty. It is possible to increase your activity level while doing everyday things you enjoy. Walking, gardening, and dancing are just a few of the things that you might try. As with all the other changes, the key is not to do too much too fast. If you don't do any activity now, start by walking for just a few minutes every other day. Do that for a few weeks. If you stick with it, try doing it for longer. But if you find that you don't like walking, try a different activity.  Drink less alcohol -- If you are a woman, do not have more than 1 "standard drink" of alcohol a day. If you are a man, do not have more than 2. A "standard drink" is:  A can or bottle that has 12 ounces of beer  A glass that has 5 ounces of wine  A shot that has 1.5 ounces of whiskey  Where should I start? -- If you want to improve your lifestyle, start by making the changes that you think would be " "easiest for you. If you used to exercise and just got out of the habit, maybe it would be easy for you to start exercising again. Or if you actually like cooking meals from scratch, maybe the first thing you should focus on is eating home-cooked meals that are low in sodium.  Whatever you tackle first, choose specific, realistic goals, and give yourself a deadline. For example, do not decide that you are going to "exercise more." Instead, decide that you are going to walk for 10 minutes on Monday, Wednesday, and Friday, and that you are going to do this for the next 2 weeks.  When lifestyle changes are too general, people have a hard time following through.  Now go. You can do it!  All topics are updated as new evidence becomes available and our peer review process is complete.  This topic retrieved from [employer] on: Sep 21, 2021.  Topic 15490 Version 8.0  Release: 29.4.2 - C29.263  © 2021 UpToDate, Inc. and/or its affiliates. All rights reserved.  figure 1: Sources of sodium in your diet     Graphic 21590 Version 2.0    Consumer Information Use and Disclaimer   This information is not specific medical advice and does not replace information you receive from your health care provider. This is only a brief summary of general information. It does NOT include all information about conditions, illnesses, injuries, tests, procedures, treatments, therapies, discharge instructions or life-style choices that may apply to you. You must talk with your health care provider for complete information about your health and treatment options. This information should not be used to decide whether or not to accept your health care provider's advice, instructions or recommendations. Only your health care provider has the knowledge and training to provide advice that is right for you. The use of this information is governed by the [employer] License Agreement, available at " https://www.[employer].com/en/solutions/lexicomp/about/link.The use of [employer] content is governed by the [employer] Terms of Use. ©2021 UpToDate, Inc. All rights reserved.  Copyright   © 2021 UpToDate, Inc. and/or its affiliates. All rights reserved.         Signature: Huey Mtz DNP, DEZP-C

## 2023-01-30 NOTE — PATIENT INSTRUCTIONS
"Patient Education       Controlling Your Blood Pressure Through Lifestyle   The Basics   Written by the doctors and editors at [facility]   What does my lifestyle have to do with my blood pressure? -- The things you do and the foods you eat have a big effect on your blood pressure and your overall health. Following the right lifestyle can:  Lower your blood pressure or keep you from getting high blood pressure in the first place  Reduce your need for blood pressure medicines  Make medicines for high blood pressure work better, if you do take them  Lower the chances that you'll have a heart attack or stroke, or develop kidney disease  Which lifestyle choices will help lower my blood pressure? -- Here's what you can do:  Lose weight (if you are overweight)  Choose a diet rich in fruits, vegetables, and low-fat dairy products, and low in meats, sweets, and refined grains  Eat less salt (sodium)  Do something active for at least 30 minutes a day on most days of the week  Limit the amount of alcohol you drink  If you have high blood pressure, it's also very important to quit smoking (if you smoke). Quitting smoking might not bring your blood pressure down. But it will lower the chances that you'll have a heart attack or stroke, and it will help you feel better and live longer.  Start low and go slow -- The changes listed above might sound like a lot, but don't worry. You don't have to change everything all at once. The key to improving your lifestyle is to "start low and go slow." Choose 1 small, specific thing to change and try doing it for a while. If it works for you, keep doing it until it becomes a habit. If it doesn't, don't give up. Choose something else to change and see how that goes.  Let's say, for example, that you would like to improve your diet. If you're the type of person who eats cheeseburgers and French fries all the time, you can't switch to eating just salads from one day to the next. When people try to " "make changes like that, they often fail. Then they feel frustrated and tend to give up. So instead of trying to change everything about your diet in 1 day, change 1 or 2 small things about your diet and give yourself time to get used to those changes. For instance, keep the cheeseburger but give up the French fries. Or eat the same things but cut your portions in half.  As you find things that you are able to change and stick with, keep adding new changes. In time, you will see that you can actually change a lot. You just have to get used to the changes slowly.  Lose weight -- When people think about losing weight, they sometimes make it more complicated than it really is. To lose weight, you have to either eat less or move more. If you do both of those things, it's even better. But there is no single weight-loss diet or activity that's better than any other. When it comes to weight loss, the most effective plan is the one that you'll stick with.  Improve your diet -- There is no single diet that is right for everyone. But in general, a healthy diet can include:  Lots of fruits, vegetables, and whole grains  Some beans, peas, lentils, chickpeas, and similar foods  Some nuts, such as walnuts, almonds, and peanuts  Fat-free or low-fat milk and milk products  Some fish  To have a healthy diet, it's also important to limit or avoid sugar, sweets, meats, and refined grains. (Refined grains are found in white bread, white rice, most forms of pasta, and most packaged "snack" foods.)  Reduce salt -- Many people think that eating a low-sodium diet means avoiding the salt shaker and not adding salt when cooking. The truth is, not adding salt at the table or when you cook will only help a little. Almost all of the sodium you eat is already in the food you buy at the grocery store or at restaurants (figure 1).  The most important thing you can do to cut down on sodium is to eat less processed food. That means that you should " "avoid most foods that are sold in cans, boxes, jars, and bags. You should also eat in restaurants less often.  To reduce the amount of sodium you get, buy fresh or fresh-frozen fruits, vegetables, and meats. (Fresh-frozen foods have had nothing added to them before freezing.) Then you can make meals at home, from scratch, with these ingredients.  As with the other changes, don't try to cut out salt all at once. Instead, choose 1 or 2 foods that have a lot of sodium and try to replace them with low-sodium choices. When you get used to those low-sodium options, find another food or 2 to change. Then keep going, until all the foods you eat are sodium-free or low in sodium.  Become more active -- If you want to be more active, you don't have to go to the gym or get all sweaty. It is possible to increase your activity level while doing everyday things you enjoy. Walking, gardening, and dancing are just a few of the things that you might try. As with all the other changes, the key is not to do too much too fast. If you don't do any activity now, start by walking for just a few minutes every other day. Do that for a few weeks. If you stick with it, try doing it for longer. But if you find that you don't like walking, try a different activity.  Drink less alcohol -- If you are a woman, do not have more than 1 "standard drink" of alcohol a day. If you are a man, do not have more than 2. A "standard drink" is:  A can or bottle that has 12 ounces of beer  A glass that has 5 ounces of wine  A shot that has 1.5 ounces of whiskey  Where should I start? -- If you want to improve your lifestyle, start by making the changes that you think would be easiest for you. If you used to exercise and just got out of the habit, maybe it would be easy for you to start exercising again. Or if you actually like cooking meals from scratch, maybe the first thing you should focus on is eating home-cooked meals that are low in sodium.  Whatever you " "tackle first, choose specific, realistic goals, and give yourself a deadline. For example, do not decide that you are going to "exercise more." Instead, decide that you are going to walk for 10 minutes on Monday, Wednesday, and Friday, and that you are going to do this for the next 2 weeks.  When lifestyle changes are too general, people have a hard time following through.  Now go. You can do it!  All topics are updated as new evidence becomes available and our peer review process is complete.  This topic retrieved from "[employer]" on: Sep 21, 2021.  Topic 72179 Version 8.0  Release: 29.4.2 - C29.263  © 2021 UpToDate, Inc. and/or its affiliates. All rights reserved.  figure 1: Sources of sodium in your diet     Graphic 04964 Version 2.0    Consumer Information Use and Disclaimer   This information is not specific medical advice and does not replace information you receive from your health care provider. This is only a brief summary of general information. It does NOT include all information about conditions, illnesses, injuries, tests, procedures, treatments, therapies, discharge instructions or life-style choices that may apply to you. You must talk with your health care provider for complete information about your health and treatment options. This information should not be used to decide whether or not to accept your health care provider's advice, instructions or recommendations. Only your health care provider has the knowledge and training to provide advice that is right for you. The use of this information is governed by the anchor.travel End User License Agreement, available at https://[email or web address]/en/solutions/[email or web address]/link.The use of "[employer]" content is governed by the "[employer]" Terms of Use. ©2021 UpToDate, Inc. All rights reserved.  Copyright   © 2021 UpToDate, Inc. and/or its affiliates. All rights reserved.    "

## 2023-01-31 ENCOUNTER — TELEPHONE (OUTPATIENT)
Dept: PRIMARY CARE CLINIC | Facility: CLINIC | Age: 53
End: 2023-01-31
Payer: COMMERCIAL

## 2023-01-31 NOTE — TELEPHONE ENCOUNTER
----- Message from Huey Mtz DNP, FNP-C sent at 1/31/2023  4:36 PM CST -----  Please notify patient of results

## 2023-02-01 ENCOUNTER — TELEPHONE (OUTPATIENT)
Dept: PRIMARY CARE CLINIC | Facility: CLINIC | Age: 53
End: 2023-02-01
Payer: COMMERCIAL

## 2023-02-15 DIAGNOSIS — M79.605 BILATERAL LEG PAIN: Primary | ICD-10-CM

## 2023-02-15 DIAGNOSIS — I87.2 VENOUS INSUFFICIENCY OF LOWER EXTREMITY, UNSPECIFIED LATERALITY: ICD-10-CM

## 2023-02-15 DIAGNOSIS — M79.604 BILATERAL LEG PAIN: Primary | ICD-10-CM

## 2023-03-07 ENCOUNTER — OFFICE VISIT (OUTPATIENT)
Dept: VASCULAR SURGERY | Facility: CLINIC | Age: 53
End: 2023-03-07
Payer: COMMERCIAL

## 2023-03-07 VITALS
HEIGHT: 68 IN | WEIGHT: 292 LBS | DIASTOLIC BLOOD PRESSURE: 90 MMHG | BODY MASS INDEX: 44.25 KG/M2 | RESPIRATION RATE: 18 BRPM | SYSTOLIC BLOOD PRESSURE: 150 MMHG | HEART RATE: 84 BPM

## 2023-03-07 DIAGNOSIS — M79.604 LEG PAIN, BILATERAL: ICD-10-CM

## 2023-03-07 DIAGNOSIS — M79.604 BILATERAL LEG PAIN: Primary | ICD-10-CM

## 2023-03-07 DIAGNOSIS — R60.0 EDEMA, LOWER EXTREMITY: ICD-10-CM

## 2023-03-07 DIAGNOSIS — M79.605 BILATERAL LEG PAIN: Primary | ICD-10-CM

## 2023-03-07 DIAGNOSIS — I87.2 VENOUS INSUFFICIENCY: ICD-10-CM

## 2023-03-07 DIAGNOSIS — M79.605 LEG PAIN, BILATERAL: ICD-10-CM

## 2023-03-07 DIAGNOSIS — L81.9 HYPERPIGMENTATION OF SKIN: ICD-10-CM

## 2023-03-07 PROCEDURE — 3080F DIAST BP >= 90 MM HG: CPT | Mod: ,,, | Performed by: FAMILY MEDICINE

## 2023-03-07 PROCEDURE — 1159F PR MEDICATION LIST DOCUMENTED IN MEDICAL RECORD: ICD-10-PCS | Mod: ,,, | Performed by: FAMILY MEDICINE

## 2023-03-07 PROCEDURE — 99214 OFFICE O/P EST MOD 30 MIN: CPT | Mod: S$PBB,,, | Performed by: FAMILY MEDICINE

## 2023-03-07 PROCEDURE — 3077F SYST BP >= 140 MM HG: CPT | Mod: ,,, | Performed by: FAMILY MEDICINE

## 2023-03-07 PROCEDURE — 99214 PR OFFICE/OUTPT VISIT, EST, LEVL IV, 30-39 MIN: ICD-10-PCS | Mod: S$PBB,,, | Performed by: FAMILY MEDICINE

## 2023-03-07 PROCEDURE — 1160F RVW MEDS BY RX/DR IN RCRD: CPT | Mod: ,,, | Performed by: FAMILY MEDICINE

## 2023-03-07 PROCEDURE — 1159F MED LIST DOCD IN RCRD: CPT | Mod: ,,, | Performed by: FAMILY MEDICINE

## 2023-03-07 PROCEDURE — 3077F PR MOST RECENT SYSTOLIC BLOOD PRESSURE >= 140 MM HG: ICD-10-PCS | Mod: ,,, | Performed by: FAMILY MEDICINE

## 2023-03-07 PROCEDURE — 3008F BODY MASS INDEX DOCD: CPT | Mod: ,,, | Performed by: FAMILY MEDICINE

## 2023-03-07 PROCEDURE — 99214 OFFICE O/P EST MOD 30 MIN: CPT | Mod: PBBFAC | Performed by: FAMILY MEDICINE

## 2023-03-07 PROCEDURE — 3080F PR MOST RECENT DIASTOLIC BLOOD PRESSURE >= 90 MM HG: ICD-10-PCS | Mod: ,,, | Performed by: FAMILY MEDICINE

## 2023-03-07 PROCEDURE — 3008F PR BODY MASS INDEX (BMI) DOCUMENTED: ICD-10-PCS | Mod: ,,, | Performed by: FAMILY MEDICINE

## 2023-03-07 PROCEDURE — 1160F PR REVIEW ALL MEDS BY PRESCRIBER/CLIN PHARMACIST DOCUMENTED: ICD-10-PCS | Mod: ,,, | Performed by: FAMILY MEDICINE

## 2023-03-07 NOTE — PROGRESS NOTES
VEIN CENTER CLINIC NOTE    Patient ID: Papo Bell is a 52 y.o. male.    I. HISTORY     Chief Complaint:   Chief Complaint   Patient presents with    Leg Pain     Room 2. STEPHEN GRIFFIN DNP BELIA LEG PAIN VENOUS INSUFF       HPI: Papo Bell is a 52 y.o. male who presents as a rereferral from [facility] for bilateral lower extremity swelling and pain.  Patient has a history of chronic venous insufficiency with ablation of the left great saphenous vein proximally 1 year ago.  His last clinic visit was proximally 1 year ago at his one-month follow-up and he did not follow up again after that.  The patient states things were good referral while following his procedure and he began having more swelling and discomfort over the past several months.  He states that he is continued to wear compression socks daily.  He also had a right total knee replacement since last visit.  No open ulcers. No history of DVT.  He also denies diagnosis of BEVERLY or CHF.    Vein procedure:   Lt. GSV RF ablation 2-    Past Medical History:   Diagnosis Date    BMI 45.0-49.9, adult 06/20/2018    Cervicalgia 04/07/2021    Diabetes mellitus, type 2     DJD (degenerative joint disease) 03/15/2019    Edema 01/27/2021    Essential hypertension 04/07/2021    GERD (gastroesophageal reflux disease) 01/13/2021    Hyperlipidemia     Hypertension     Low back pain 06/19/2019    Osteoporosis     Pain in right shoulder 04/07/2021    Tachycardia 04/07/2021        Past Surgical History:   Procedure Laterality Date    CARPAL TUNNEL RELEASE Left 2/17/2022    Procedure: RELEASE, CARPAL TUNNEL;  Surgeon: Wilber Crocker MD;  Location: [facility] OR;  Service: Orthopedics;  Laterality: Left;    PATELLA TENDONS REPAIR Bilateral     RADIOFREQUENCY ABLATION Left 2/11/2022    Procedure: Left GSV RF Ablation;  Surgeon: Collins Canada DO;  Location: [facility] OR;  Service: Vascular;  Laterality: Left;    TORN MENISCUS Left     ULNAR NERVE  TRANSPOSITION Left 2/17/2022    Procedure: TRANSPOSITION, NERVE, ULNAR;  Surgeon: Wilber Crocker MD;  Location: [facility];  Service: Orthopedics;  Laterality: Left;    WRIST FRACTURE SURGERY Left        Social History     Tobacco Use   Smoking Status Never   Smokeless Tobacco Never         Current Outpatient Medications:     amLODIPine (NORVASC) 10 MG tablet, TAKE 1 TABLET DAILY, Disp: 90 tablet, Rfl: 1    aspirin (ECOTRIN) 81 MG EC tablet, Take 1 tablet (81 mg total) by mouth 2 (two) times daily., Disp: 90 tablet, Rfl: 2    atorvastatin (LIPITOR) 20 MG tablet, TAKE 1 TABLET EVERY EVENING, Disp: 90 tablet, Rfl: 1    cetirizine (ZYRTEC) 10 MG tablet, Take 1 tablet (10 mg total) by mouth once daily., Disp: 30 tablet, Rfl: 11    diclofenac (VOLTAREN) 75 MG EC tablet, Take 75 mg by mouth 2 (two) times daily., Disp: , Rfl:     ergocalciferol (ERGOCALCIFEROL) 50,000 unit Cap, Take 1 capsule by mouth weekly for 12 weeks then take 1 capsule monthly after that, Disp: 12 capsule, Rfl: 2    esomeprazole (NEXIUM) 20 MG capsule, Take 1 capsule (20 mg total) by mouth before breakfast., Disp: 30 capsule, Rfl: 11    gabapentin (NEURONTIN) 100 MG capsule, Take 1 capsule (100 mg total) by mouth 3 (three) times daily., Disp: 90 capsule, Rfl: 5    hydroCHLOROthiazide (MICROZIDE) 12.5 mg capsule, , Disp: , Rfl:     HYDROcodone-acetaminophen (NORCO) 5-325 mg per tablet, Take 1 tablet by mouth 3 (three) times daily., Disp: , Rfl:     metFORMIN (GLUCOPHAGE) 500 MG tablet, TAKE 1 TABLET DAILY WITH   BREAKFAST, Disp: 90 tablet, Rfl: 1    mineral oil-hydrophil petrolat (AQUAPHOR) Oint, Apply topically as needed (dry skin)., Disp: 396 g, Rfl: 1    omeprazole (PRILOSEC) 40 MG capsule, , Disp: , Rfl:     potassium chloride SA (K-DUR,KLOR-CON) 20 MEQ tablet, Take 1 tablet (20 mEq total) by mouth once daily., Disp: 30 tablet, Rfl: 5    tadalafiL (CIALIS) 5 MG tablet, Take one tablet prn before sexual activity; effects may last 36 hours.  Max dose is 20 mg/dose up to 1 dose/24 hours., Disp: 10 tablet, Rfl: 1    timolol maleate 0.5% (TIMOPTIC) 0.5 % Drop, PUT 1 DROP INTO AFFECTED EYE(S) EVERY MORNING, Disp: , Rfl:     omega 3-dha-epa-fish oil (FISH OIL) 1,000 mg (120 mg-180 mg) Cap, Take 1 capsule by mouth once daily., Disp: 90 capsule, Rfl: 3    Review of Systems   Constitutional:  Negative for fatigue and fever.   Eyes:  Negative for pain.   Respiratory:  Negative for chest tightness and shortness of breath.    Cardiovascular:  Positive for leg swelling. Negative for chest pain.   Gastrointestinal:  Negative for abdominal pain.   Musculoskeletal:  Negative for leg pain.        Chronic darker skin changes bilaterally   Neurological:  Positive for numbness. Negative for syncope.   Psychiatric/Behavioral:  Negative for sleep disturbance.         II. PHYSICAL EXAM     Physical Exam  Vitals reviewed.   Constitutional:       General: He is not in acute distress.     Appearance: He is obese.   HENT:      Head: Normocephalic.   Eyes:      Pupils: Pupils are equal, round, and reactive to light.   Cardiovascular:      Rate and Rhythm: Regular rhythm. Tachycardia present.   Pulmonary:      Effort: Pulmonary effort is normal.   Abdominal:      Palpations: Abdomen is soft.   Musculoskeletal:         General: No swelling. Normal range of motion.      Cervical back: Neck supple.      Left lower leg: Edema present.      Comments: Bilateral lower extremity edema with hyperpigmentation without evidence of cellulitis.   Skin:     General: Skin is warm and dry.   Neurological:      Mental Status: He is alert and oriented to person, place, and time.         III. ASSESSMENT & PLAN (MEDICAL DECISION MAKING)     1. Bilateral leg pain    2. Hyperpigmentation of skin    3. Edema, lower extremity    4. Venous insufficiency    5. Leg pain, bilateral        Assessment/Diagnosis and Plan:  The patient is exhibiting sequela of chronic venous insufficiency despite previous  intervention and conservative therapy.  I will order a bilateral complete venous reflux study and see the patient back with results.      Collins Canada, DO

## 2023-03-15 RX ORDER — AMLODIPINE BESYLATE 10 MG/1
10 TABLET ORAL DAILY
Qty: 90 TABLET | Refills: 1 | Status: SHIPPED | OUTPATIENT
Start: 2023-03-15 | End: 2023-08-08

## 2023-03-20 ENCOUNTER — OFFICE VISIT (OUTPATIENT)
Dept: VASCULAR SURGERY | Facility: CLINIC | Age: 53
End: 2023-03-20
Payer: COMMERCIAL

## 2023-03-20 ENCOUNTER — HOSPITAL ENCOUNTER (OUTPATIENT)
Dept: RADIOLOGY | Facility: HOSPITAL | Age: 53
Discharge: HOME | End: 2023-03-20
Attending: FAMILY MEDICINE
Payer: COMMERCIAL

## 2023-03-20 VITALS
SYSTOLIC BLOOD PRESSURE: 150 MMHG | RESPIRATION RATE: 16 BRPM | BODY MASS INDEX: 43.07 KG/M2 | WEIGHT: 284.19 LBS | DIASTOLIC BLOOD PRESSURE: 84 MMHG | HEIGHT: 68 IN | HEART RATE: 66 BPM

## 2023-03-20 DIAGNOSIS — M79.604 LEG PAIN, BILATERAL: ICD-10-CM

## 2023-03-20 DIAGNOSIS — L81.9 HYPERPIGMENTATION OF SKIN: ICD-10-CM

## 2023-03-20 DIAGNOSIS — R60.0 EDEMA, LOWER EXTREMITY: ICD-10-CM

## 2023-03-20 DIAGNOSIS — L81.9 HYPERPIGMENTATION OF SKIN: Primary | ICD-10-CM

## 2023-03-20 DIAGNOSIS — M79.605 LEG PAIN, BILATERAL: ICD-10-CM

## 2023-03-20 DIAGNOSIS — M79.604 BILATERAL LEG PAIN: ICD-10-CM

## 2023-03-20 DIAGNOSIS — I87.2 VENOUS INSUFFICIENCY: ICD-10-CM

## 2023-03-20 DIAGNOSIS — M79.605 BILATERAL LEG PAIN: ICD-10-CM

## 2023-03-20 PROCEDURE — 99214 PR OFFICE/OUTPT VISIT, EST, LEVL IV, 30-39 MIN: ICD-10-PCS | Mod: S$PBB,,, | Performed by: FAMILY MEDICINE

## 2023-03-20 PROCEDURE — 99214 OFFICE O/P EST MOD 30 MIN: CPT | Mod: S$PBB,,, | Performed by: FAMILY MEDICINE

## 2023-03-20 PROCEDURE — 1160F RVW MEDS BY RX/DR IN RCRD: CPT | Mod: ,,, | Performed by: FAMILY MEDICINE

## 2023-03-20 PROCEDURE — 3079F PR MOST RECENT DIASTOLIC BLOOD PRESSURE 80-89 MM HG: ICD-10-PCS | Mod: ,,, | Performed by: FAMILY MEDICINE

## 2023-03-20 PROCEDURE — 3079F DIAST BP 80-89 MM HG: CPT | Mod: ,,, | Performed by: FAMILY MEDICINE

## 2023-03-20 PROCEDURE — 99215 OFFICE O/P EST HI 40 MIN: CPT | Mod: PBBFAC,25 | Performed by: FAMILY MEDICINE

## 2023-03-20 PROCEDURE — 3077F SYST BP >= 140 MM HG: CPT | Mod: ,,, | Performed by: FAMILY MEDICINE

## 2023-03-20 PROCEDURE — 3008F BODY MASS INDEX DOCD: CPT | Mod: ,,, | Performed by: FAMILY MEDICINE

## 2023-03-20 PROCEDURE — 93970 EXTREMITY STUDY: CPT | Mod: TC

## 2023-03-20 PROCEDURE — 1160F PR REVIEW ALL MEDS BY PRESCRIBER/CLIN PHARMACIST DOCUMENTED: ICD-10-PCS | Mod: ,,, | Performed by: FAMILY MEDICINE

## 2023-03-20 PROCEDURE — 93970 US VENOUS REFLUX STUDY BILATERAL: ICD-10-PCS | Mod: 26,,, | Performed by: FAMILY MEDICINE

## 2023-03-20 PROCEDURE — 1159F MED LIST DOCD IN RCRD: CPT | Mod: ,,, | Performed by: FAMILY MEDICINE

## 2023-03-20 PROCEDURE — 3077F PR MOST RECENT SYSTOLIC BLOOD PRESSURE >= 140 MM HG: ICD-10-PCS | Mod: ,,, | Performed by: FAMILY MEDICINE

## 2023-03-20 PROCEDURE — 1159F PR MEDICATION LIST DOCUMENTED IN MEDICAL RECORD: ICD-10-PCS | Mod: ,,, | Performed by: FAMILY MEDICINE

## 2023-03-20 PROCEDURE — 3008F PR BODY MASS INDEX (BMI) DOCUMENTED: ICD-10-PCS | Mod: ,,, | Performed by: FAMILY MEDICINE

## 2023-03-20 PROCEDURE — 93970 EXTREMITY STUDY: CPT | Mod: 26,,, | Performed by: FAMILY MEDICINE

## 2023-03-20 NOTE — PROGRESS NOTES
VEIN CENTER CLINIC NOTE    Patient ID: Papo Bell is a 52 y.o. male.    I. HISTORY     Chief Complaint:   Chief Complaint   Patient presents with    Results     Exam room 2.  Bilateral complete reflux study.       HPI: Papo Bell is a 52 y.o. male who presents for follow-up and results to a bilateral complete venous reflux study.  Study performed today 03/20/2023, shows no evidence of DVT bilaterally.  The study also shows reflux in the distal right great saphenous vein.  The left great saphenous vein also appears ablated as expected.  Distal reflux noted in the left small saphenous vein as well.    Patient seen recently as a referral from [facility] on 03/07/2023 for bilateral lower extremity swelling and pain.  Patient has a history of chronic venous insufficiency with ablation of the left great saphenous vein proximally 1 year ago.  His last clinic visit was proximally 1 year ago at his one-month follow-up and he did not follow up again after that.  The patient states things were good referral while following his procedure and he began having more swelling and discomfort over the past several months.  He states that he is continued to wear compression socks daily.  He also had a right total knee replacement since last visit.  No open ulcers. No history of DVT.  He also denies diagnosis of BEVERLY or CHF.    Vein procedure:   Lt. GSV RF ablation 2-    Past Medical History:   Diagnosis Date    BMI 45.0-49.9, adult 06/20/2018    Cervicalgia 04/07/2021    Diabetes mellitus, type 2     DJD (degenerative joint disease) 03/15/2019    Edema 01/27/2021    Essential hypertension 04/07/2021    GERD (gastroesophageal reflux disease) 01/13/2021    Hyperlipidemia     Hypertension     Low back pain 06/19/2019    Osteoporosis     Pain in right shoulder 04/07/2021    Tachycardia 04/07/2021        Past Surgical History:   Procedure Laterality Date    CARPAL TUNNEL RELEASE Left 2/17/2022    Procedure: RELEASE,  CARPAL TUNNEL;  Surgeon: Wilber Crocker MD;  Location: [facility] OR;  Service: Orthopedics;  Laterality: Left;    PATELLA TENDONS REPAIR Bilateral     RADIOFREQUENCY ABLATION Left 2/11/2022    Procedure: Left GSV RF Ablation;  Surgeon: Collins Canada DO;  Location: [facility] OR;  Service: Vascular;  Laterality: Left;    TORN MENISCUS Left     ULNAR NERVE TRANSPOSITION Left 2/17/2022    Procedure: TRANSPOSITION, NERVE, ULNAR;  Surgeon: Wilber Crocker MD;  Location: [facility] OR;  Service: Orthopedics;  Laterality: Left;    WRIST FRACTURE SURGERY Left        Social History     Tobacco Use   Smoking Status Never   Smokeless Tobacco Never         Current Outpatient Medications:     amLODIPine (NORVASC) 10 MG tablet, Take 1 tablet (10 mg total) by mouth once daily., Disp: 90 tablet, Rfl: 1    aspirin (ECOTRIN) 81 MG EC tablet, Take 1 tablet (81 mg total) by mouth 2 (two) times daily., Disp: 90 tablet, Rfl: 2    atorvastatin (LIPITOR) 20 MG tablet, TAKE 1 TABLET EVERY EVENING, Disp: 90 tablet, Rfl: 1    diclofenac (VOLTAREN) 75 MG EC tablet, Take 75 mg by mouth 2 (two) times daily., Disp: , Rfl:     ergocalciferol (ERGOCALCIFEROL) 50,000 unit Cap, Take 1 capsule by mouth weekly for 12 weeks then take 1 capsule monthly after that, Disp: 12 capsule, Rfl: 2    esomeprazole (NEXIUM) 20 MG capsule, Take 1 capsule (20 mg total) by mouth before breakfast., Disp: 30 capsule, Rfl: 11    gabapentin (NEURONTIN) 100 MG capsule, Take 1 capsule (100 mg total) by mouth 3 (three) times daily., Disp: 90 capsule, Rfl: 5    potassium chloride SA (K-DUR,KLOR-CON) 20 MEQ tablet, Take 1 tablet (20 mEq total) by mouth once daily., Disp: 30 tablet, Rfl: 5    tadalafiL (CIALIS) 5 MG tablet, Take one tablet prn before sexual activity; effects may last 36 hours. Max dose is 20 mg/dose up to 1 dose/24 hours., Disp: 10 tablet, Rfl: 1    cetirizine (ZYRTEC) 10 MG tablet, Take 1 tablet (10 mg total) by mouth once daily. (Patient not  taking: Reported on 3/20/2023), Disp: 30 tablet, Rfl: 11    hydroCHLOROthiazide (MICROZIDE) 12.5 mg capsule, , Disp: , Rfl:     HYDROcodone-acetaminophen (NORCO) 5-325 mg per tablet, Take 1 tablet by mouth 3 (three) times daily., Disp: , Rfl:     metFORMIN (GLUCOPHAGE) 500 MG tablet, TAKE 1 TABLET DAILY WITH   BREAKFAST (Patient not taking: Reported on 3/20/2023), Disp: 90 tablet, Rfl: 1    mineral oil-hydrophil petrolat (AQUAPHOR) Oint, Apply topically as needed (dry skin). (Patient not taking: Reported on 3/20/2023), Disp: 396 g, Rfl: 1    omega 3-dha-epa-fish oil (FISH OIL) 1,000 mg (120 mg-180 mg) Cap, Take 1 capsule by mouth once daily., Disp: 90 capsule, Rfl: 3    omeprazole (PRILOSEC) 40 MG capsule, , Disp: , Rfl:     timolol maleate 0.5% (TIMOPTIC) 0.5 % Drop, PUT 1 DROP INTO AFFECTED EYE(S) EVERY MORNING, Disp: , Rfl:     Review of Systems   Constitutional:  Negative for fatigue and fever.   Eyes:  Negative for pain.   Respiratory:  Negative for chest tightness and shortness of breath.    Cardiovascular:  Positive for leg swelling. Negative for chest pain.   Gastrointestinal:  Negative for abdominal pain.   Musculoskeletal:  Negative for leg pain.        Chronic darker skin changes bilaterally   Neurological:  Positive for numbness. Negative for syncope.   Psychiatric/Behavioral:  Negative for sleep disturbance.         II. PHYSICAL EXAM     Physical Exam  Vitals reviewed.   Constitutional:       General: He is not in acute distress.     Appearance: He is obese.   HENT:      Head: Normocephalic.   Eyes:      Pupils: Pupils are equal, round, and reactive to light.   Cardiovascular:      Rate and Rhythm: Regular rhythm. Tachycardia present.   Pulmonary:      Effort: Pulmonary effort is normal.   Abdominal:      Palpations: Abdomen is soft.   Musculoskeletal:         General: No swelling. Normal range of motion.      Cervical back: Neck supple.      Left lower leg: Edema present.      Comments: Bilateral lower  extremity edema with hyperpigmentation without evidence of cellulitis.   Skin:     General: Skin is warm and dry.   Neurological:      Mental Status: He is alert and oriented to person, place, and time.       III. ASSESSMENT & PLAN (MEDICAL DECISION MAKING)     1. Hyperpigmentation of skin    2. Edema, lower extremity    3. Leg pain, bilateral    4. Venous insufficiency        Assessment/Diagnosis and Plan:  Ultrasound of lower extremities reveals positive evidence of venous insufficiency in the right distal great saphenous vein and left small saphenous vein.  Left proximal great saphenous vein appears ablated..  Plan for conservative medical treatment at this time. The patient may benefit from endovenous ablation in the future.     - continue compression with 20-30mmHg Rx stockings  - Therapeutic leg elevation  - Calf pumping exercises  - RTC 3 months for further evaluation  - have also recommended patient follow up with his PCP.  He may benefit from inflammatory marker testing including gout or rheumatoid.  Make require further referrals to podiatry or Neurology.  Most of the pain appears to be in his feet which I do not think is venous related.      Collins Canada, DO

## 2023-06-28 ENCOUNTER — OFFICE VISIT (OUTPATIENT)
Dept: VASCULAR SURGERY | Facility: CLINIC | Age: 53
End: 2023-06-28
Payer: COMMERCIAL

## 2023-06-28 VITALS
HEART RATE: 96 BPM | BODY MASS INDEX: 45.52 KG/M2 | WEIGHT: 300.38 LBS | RESPIRATION RATE: 16 BRPM | DIASTOLIC BLOOD PRESSURE: 82 MMHG | SYSTOLIC BLOOD PRESSURE: 144 MMHG | HEIGHT: 68 IN

## 2023-06-28 DIAGNOSIS — M79.605 LEG PAIN, BILATERAL: ICD-10-CM

## 2023-06-28 DIAGNOSIS — L81.9 HYPERPIGMENTATION OF SKIN: Primary | ICD-10-CM

## 2023-06-28 DIAGNOSIS — R60.0 EDEMA, LOWER EXTREMITY: ICD-10-CM

## 2023-06-28 DIAGNOSIS — I87.2 VENOUS INSUFFICIENCY: ICD-10-CM

## 2023-06-28 DIAGNOSIS — M79.604 LEG PAIN, BILATERAL: ICD-10-CM

## 2023-06-28 PROCEDURE — 1159F MED LIST DOCD IN RCRD: CPT | Mod: ,,, | Performed by: FAMILY MEDICINE

## 2023-06-28 PROCEDURE — 1160F PR REVIEW ALL MEDS BY PRESCRIBER/CLIN PHARMACIST DOCUMENTED: ICD-10-PCS | Mod: ,,, | Performed by: FAMILY MEDICINE

## 2023-06-28 PROCEDURE — 99214 PR OFFICE/OUTPT VISIT, EST, LEVL IV, 30-39 MIN: ICD-10-PCS | Mod: S$PBB,,, | Performed by: FAMILY MEDICINE

## 2023-06-28 PROCEDURE — 3079F DIAST BP 80-89 MM HG: CPT | Mod: ,,, | Performed by: FAMILY MEDICINE

## 2023-06-28 PROCEDURE — 3008F BODY MASS INDEX DOCD: CPT | Mod: ,,, | Performed by: FAMILY MEDICINE

## 2023-06-28 PROCEDURE — 99214 OFFICE O/P EST MOD 30 MIN: CPT | Mod: S$PBB,,, | Performed by: FAMILY MEDICINE

## 2023-06-28 PROCEDURE — 99215 OFFICE O/P EST HI 40 MIN: CPT | Mod: PBBFAC | Performed by: FAMILY MEDICINE

## 2023-06-28 PROCEDURE — 3008F PR BODY MASS INDEX (BMI) DOCUMENTED: ICD-10-PCS | Mod: ,,, | Performed by: FAMILY MEDICINE

## 2023-06-28 PROCEDURE — 1159F PR MEDICATION LIST DOCUMENTED IN MEDICAL RECORD: ICD-10-PCS | Mod: ,,, | Performed by: FAMILY MEDICINE

## 2023-06-28 PROCEDURE — 3079F PR MOST RECENT DIASTOLIC BLOOD PRESSURE 80-89 MM HG: ICD-10-PCS | Mod: ,,, | Performed by: FAMILY MEDICINE

## 2023-06-28 PROCEDURE — 1160F RVW MEDS BY RX/DR IN RCRD: CPT | Mod: ,,, | Performed by: FAMILY MEDICINE

## 2023-06-28 PROCEDURE — 3077F SYST BP >= 140 MM HG: CPT | Mod: ,,, | Performed by: FAMILY MEDICINE

## 2023-06-28 PROCEDURE — 3077F PR MOST RECENT SYSTOLIC BLOOD PRESSURE >= 140 MM HG: ICD-10-PCS | Mod: ,,, | Performed by: FAMILY MEDICINE

## 2023-06-28 NOTE — PROGRESS NOTES
VEIN CENTER CLINIC NOTE    Patient ID: Papo Bell is a 52 y.o. male.    I. HISTORY     Chief Complaint:   Chief Complaint   Patient presents with    Follow-up     3M Venous       HPI: Papo Bell is a 52 y.o. male who presents for 3 month venous follow-up after restarting the patient on conservative therapy consisting of 20 30 mm Hg compression stockings, calf pumping exercises and therapeutic leg elevation.  The patient states that he continues to have a lot of swelling in his bilateral extremities, especially on the right.  He admits he has been working somewhat anxious lately the conservative therapy does not seem to be controlling his symptoms.  He has mild reflux noted on last ultrasound, but nothing severe enough to be causing his complaints at this time.  No signs of active cellulitis or open ulceration.    Clinical summary:  [facility] for bilateral lower extremity swelling and pain.  Patient has a history of chronic venous insufficiency with ablation of the left great saphenous vein proximally 1 year ago.  His last clinic visit was proximally 1 year ago at his one-month follow-up and he did not follow up again after that.  The patient states things were good referral while following his procedure and he began having more swelling and discomfort over the past several months.  He states that he is continued to wear compression socks daily.  He also had a right total knee replacement since last visit.  No open ulcers. No history of DVT.  He also denies diagnosis of BEVERLY or CHF.    Vein procedure:   Lt. GSV RF ablation 2-    Bilateral complete venous reflux study 03/20/2023, shows no evidence of DVT bilaterally.  The study also shows reflux in the distal right great saphenous vein.  The left great saphenous vein also appears ablated as expected.  Distal reflux noted in the left small saphenous vein as well.    Past Medical History:    Diagnosis Date    BMI 45.0-49.9, adult 06/20/2018    Cervicalgia 04/07/2021    Diabetes mellitus, type 2     DJD (degenerative joint disease) 03/15/2019    Edema 01/27/2021    Essential hypertension 04/07/2021    GERD (gastroesophageal reflux disease) 01/13/2021    Hyperlipidemia     Hypertension     Low back pain 06/19/2019    Osteoporosis     Pain in right shoulder 04/07/2021    Tachycardia 04/07/2021        Past Surgical History:   Procedure Laterality Date    CARPAL TUNNEL RELEASE Left 2/17/2022    Procedure: RELEASE, CARPAL TUNNEL;  Surgeon: Wilber Crocker MD;  Location: [facility] OR;  Service: Orthopedics;  Laterality: Left;    PATELLA TENDONS REPAIR Bilateral     RADIOFREQUENCY ABLATION Left 2/11/2022    Procedure: Left GSV RF Ablation;  Surgeon: Collins Canada DO;  Location: [facility] OR;  Service: Vascular;  Laterality: Left;    TORN MENISCUS Left     ULNAR NERVE TRANSPOSITION Left 2/17/2022    Procedure: TRANSPOSITION, NERVE, ULNAR;  Surgeon: Wilber Crocker MD;  Location: [facility] OR;  Service: Orthopedics;  Laterality: Left;    WRIST FRACTURE SURGERY Left        Social History     Tobacco Use   Smoking Status Never   Smokeless Tobacco Never         Current Outpatient Medications:     amLODIPine (NORVASC) 10 MG tablet, Take 1 tablet (10 mg total) by mouth once daily., Disp: 90 tablet, Rfl: 1    aspirin (ECOTRIN) 81 MG EC tablet, Take 1 tablet (81 mg total) by mouth 2 (two) times daily., Disp: 90 tablet, Rfl: 2    atorvastatin (LIPITOR) 20 MG tablet, TAKE 1 TABLET EVERY EVENING, Disp: 90 tablet, Rfl: 1    diclofenac (VOLTAREN) 75 MG EC tablet, Take 75 mg by mouth 2 (two) times daily., Disp: , Rfl:     ergocalciferol (ERGOCALCIFEROL) 50,000 unit Cap, Take 1 capsule by mouth weekly for 12 weeks then take 1 capsule monthly after that, Disp: 12 capsule, Rfl: 2    esomeprazole (NEXIUM) 20 MG capsule, Take 1 capsule (20 mg total) by mouth before breakfast., Disp: 30 capsule, Rfl: 11     gabapentin (NEURONTIN) 100 MG capsule, Take 1 capsule (100 mg total) by mouth 3 (three) times daily., Disp: 90 capsule, Rfl: 5    hydroCHLOROthiazide (MICROZIDE) 12.5 mg capsule, , Disp: , Rfl:     HYDROcodone-acetaminophen (NORCO) 5-325 mg per tablet, Take 1 tablet by mouth 3 (three) times daily., Disp: , Rfl:     metFORMIN (GLUCOPHAGE) 500 MG tablet, TAKE 1 TABLET DAILY WITH   BREAKFAST, Disp: 90 tablet, Rfl: 1    mineral oil-hydrophil petrolat (AQUAPHOR) Oint, Apply topically as needed (dry skin)., Disp: 396 g, Rfl: 1    omeprazole (PRILOSEC) 40 MG capsule, , Disp: , Rfl:     potassium chloride SA (K-DUR,KLOR-CON) 20 MEQ tablet, Take 1 tablet (20 mEq total) by mouth once daily., Disp: 30 tablet, Rfl: 5    tadalafiL (CIALIS) 5 MG tablet, Take one tablet prn before sexual activity; effects may last 36 hours. Max dose is 20 mg/dose up to 1 dose/24 hours., Disp: 10 tablet, Rfl: 1    timolol maleate 0.5% (TIMOPTIC) 0.5 % Drop, PUT 1 DROP INTO AFFECTED EYE(S) EVERY MORNING, Disp: , Rfl:     cetirizine (ZYRTEC) 10 MG tablet, Take 1 tablet (10 mg total) by mouth once daily. (Patient not taking: Reported on 6/28/2023), Disp: 30 tablet, Rfl: 11    omega 3-dha-epa-fish oil (FISH OIL) 1,000 mg (120 mg-180 mg) Cap, Take 1 capsule by mouth once daily., Disp: 90 capsule, Rfl: 3    Review of Systems   Constitutional:  Negative for fatigue and fever.   Eyes:  Negative for pain.   Respiratory:  Negative for chest tightness and shortness of breath.    Cardiovascular:  Positive for leg swelling. Negative for chest pain.   Gastrointestinal:  Negative for abdominal pain.   Musculoskeletal:  Negative for leg pain.        Chronic darker skin changes bilaterally   Neurological:  Positive for numbness. Negative for syncope.   Psychiatric/Behavioral:  Negative for sleep disturbance.         II. PHYSICAL EXAM     Physical Exam  Vitals reviewed.   Constitutional:       General: He is not in acute distress.     Appearance: He is obese.    HENT:      Head: Normocephalic.   Eyes:      Pupils: Pupils are equal, round, and reactive to light.   Cardiovascular:      Rate and Rhythm: Regular rhythm. Tachycardia present.   Pulmonary:      Effort: Pulmonary effort is normal.   Abdominal:      Palpations: Abdomen is soft.   Musculoskeletal:         General: No swelling. Normal range of motion.      Cervical back: Neck supple.      Left lower leg: Edema present.      Comments: Bilateral lower extremity edema with hyperpigmentation without evidence of cellulitis.   Skin:     General: Skin is warm and dry.   Neurological:      Mental Status: He is alert and oriented to person, place, and time.       III. ASSESSMENT & PLAN (MEDICAL DECISION MAKING)     1. Hyperpigmentation of skin    2. Edema, lower extremity    3. Leg pain, bilateral    4. Venous insufficiency        Assessment/Diagnosis and Plan:  Previous Ultrasound of lower extremities reveals positive evidence of venous insufficiency in the right distal great saphenous vein and left small saphenous vein.  Left proximal great saphenous vein appears ablated..  Plan for conservative medical treatment at this time. The patient may benefit from endovenous ablation in the future.     - continue compression with 20-30mmHg Rx stockings  - Therapeutic leg elevation  - Calf pumping exercises  - RTC 3 months for further evaluation  - due to ongoing lower extremity symptoms, I will order a CT venogram see the patient back with results.      Collins Canada, DO

## 2023-07-18 ENCOUNTER — HOSPITAL ENCOUNTER (OUTPATIENT)
Dept: RADIOLOGY | Facility: HOSPITAL | Age: 53
Discharge: HOME | End: 2023-07-18
Attending: FAMILY MEDICINE
Payer: COMMERCIAL

## 2023-07-18 DIAGNOSIS — R60.0 EDEMA, LOWER EXTREMITY: ICD-10-CM

## 2023-07-18 DIAGNOSIS — I87.2 VENOUS INSUFFICIENCY: ICD-10-CM

## 2023-07-18 DIAGNOSIS — M79.605 LEG PAIN, BILATERAL: ICD-10-CM

## 2023-07-18 DIAGNOSIS — L81.9 HYPERPIGMENTATION OF SKIN: ICD-10-CM

## 2023-07-18 DIAGNOSIS — M79.604 LEG PAIN, BILATERAL: ICD-10-CM

## 2023-07-18 PROCEDURE — 25500020 PHARM REV CODE 255: Performed by: FAMILY MEDICINE

## 2023-07-18 PROCEDURE — 75635 CT ANGIO ABDOMINAL ARTERIES: CPT | Mod: TC

## 2023-07-18 PROCEDURE — 75635 CT ANGIO ABDOMINAL ARTERIES: CPT | Mod: 26,,, | Performed by: RADIOLOGY

## 2023-07-18 PROCEDURE — 75635 CT VENOGRAPHY PELVIS LOWER EXTREMITY W/CONTRAST: ICD-10-PCS | Mod: 26,,, | Performed by: RADIOLOGY

## 2023-07-18 RX ADMIN — IOPAMIDOL 100 ML: 755 INJECTION, SOLUTION INTRAVENOUS at 02:07

## 2023-07-19 ENCOUNTER — PATIENT MESSAGE (OUTPATIENT)
Dept: ADMINISTRATIVE | Facility: HOSPITAL | Age: 53
End: 2023-07-19

## 2023-07-24 ENCOUNTER — OFFICE VISIT (OUTPATIENT)
Dept: VASCULAR SURGERY | Facility: CLINIC | Age: 53
End: 2023-07-24
Payer: COMMERCIAL

## 2023-07-24 VITALS
WEIGHT: 298.81 LBS | HEIGHT: 68 IN | SYSTOLIC BLOOD PRESSURE: 132 MMHG | DIASTOLIC BLOOD PRESSURE: 88 MMHG | RESPIRATION RATE: 14 BRPM | BODY MASS INDEX: 45.29 KG/M2 | HEART RATE: 64 BPM

## 2023-07-24 DIAGNOSIS — M79.605 LEG PAIN, BILATERAL: ICD-10-CM

## 2023-07-24 DIAGNOSIS — I89.0 SECONDARY LYMPHEDEMA: ICD-10-CM

## 2023-07-24 DIAGNOSIS — I87.2 VENOUS INSUFFICIENCY: ICD-10-CM

## 2023-07-24 DIAGNOSIS — M79.604 LEG PAIN, BILATERAL: ICD-10-CM

## 2023-07-24 DIAGNOSIS — L81.9 HYPERPIGMENTATION OF SKIN: Primary | ICD-10-CM

## 2023-07-24 DIAGNOSIS — R60.0 EDEMA, LOWER EXTREMITY: ICD-10-CM

## 2023-07-24 PROCEDURE — 3008F BODY MASS INDEX DOCD: CPT | Mod: ,,, | Performed by: FAMILY MEDICINE

## 2023-07-24 PROCEDURE — 3008F PR BODY MASS INDEX (BMI) DOCUMENTED: ICD-10-PCS | Mod: ,,, | Performed by: FAMILY MEDICINE

## 2023-07-24 PROCEDURE — 1160F PR REVIEW ALL MEDS BY PRESCRIBER/CLIN PHARMACIST DOCUMENTED: ICD-10-PCS | Mod: ,,, | Performed by: FAMILY MEDICINE

## 2023-07-24 PROCEDURE — 1159F MED LIST DOCD IN RCRD: CPT | Mod: ,,, | Performed by: FAMILY MEDICINE

## 2023-07-24 PROCEDURE — 99214 OFFICE O/P EST MOD 30 MIN: CPT | Mod: S$PBB,,, | Performed by: FAMILY MEDICINE

## 2023-07-24 PROCEDURE — 3079F DIAST BP 80-89 MM HG: CPT | Mod: ,,, | Performed by: FAMILY MEDICINE

## 2023-07-24 PROCEDURE — 99215 OFFICE O/P EST HI 40 MIN: CPT | Mod: PBBFAC | Performed by: FAMILY MEDICINE

## 2023-07-24 PROCEDURE — 1159F PR MEDICATION LIST DOCUMENTED IN MEDICAL RECORD: ICD-10-PCS | Mod: ,,, | Performed by: FAMILY MEDICINE

## 2023-07-24 PROCEDURE — 1160F RVW MEDS BY RX/DR IN RCRD: CPT | Mod: ,,, | Performed by: FAMILY MEDICINE

## 2023-07-24 PROCEDURE — 3079F PR MOST RECENT DIASTOLIC BLOOD PRESSURE 80-89 MM HG: ICD-10-PCS | Mod: ,,, | Performed by: FAMILY MEDICINE

## 2023-07-24 PROCEDURE — 99214 PR OFFICE/OUTPT VISIT, EST, LEVL IV, 30-39 MIN: ICD-10-PCS | Mod: S$PBB,,, | Performed by: FAMILY MEDICINE

## 2023-07-24 PROCEDURE — 3075F PR MOST RECENT SYSTOLIC BLOOD PRESS GE 130-139MM HG: ICD-10-PCS | Mod: ,,, | Performed by: FAMILY MEDICINE

## 2023-07-24 PROCEDURE — 3075F SYST BP GE 130 - 139MM HG: CPT | Mod: ,,, | Performed by: FAMILY MEDICINE

## 2023-07-24 NOTE — PROGRESS NOTES
VEIN CENTER CLINIC NOTE    Patient ID: Papo Bell is a 52 y.o. male.    I. HISTORY     Chief Complaint:   Chief Complaint   Patient presents with    Results     Exam room 1.  CT-Venogram        HPI: Papo Bell is a 52 y.o. male who presents for follow-up and results to a CT venogram with contrast.  The test performed on 07/18/2023 shows no evidence of venous occlusion or narrowing.  Left-sided splenorenal varices and shunting is present.  Moderate size hiatal hernia.    Patient presented on 06/28/2023 after restarting the patient on conservative therapy consisting of 20 30 mm Hg compression stockings, calf pumping exercises and therapeutic leg elevation.  The patient states that he continues to have a lot of swelling in his bilateral extremities, especially on the right.  He admits he has been working somewhat anxious lately the conservative therapy does not seem to be controlling his symptoms.  He has mild reflux noted on last ultrasound, but nothing severe enough to be causing his complaints at this time.  No signs of active cellulitis or open ulceration.    Clinical summary:  Patient seen recently as a referral from [facility] on 03/07/2023 for bilateral lower extremity swelling and pain.  Patient has a history of chronic venous insufficiency with ablation of the left great saphenous vein proximally 1 year ago.  His last clinic visit was proximally 1 year ago at his one-month follow-up and he did not follow up again after that.  The patient states things were good referral while following his procedure and he began having more swelling and discomfort over the past several months.  He states that he is continued to wear compression socks daily.  He also had a right total knee replacement since last visit.  No open ulcers. No history of DVT.  He also denies diagnosis of BEVERLY or CHF.    Vein procedure:   Lt. GSV RF ablation 2-    Bilateral complete venous reflux study 03/20/2023, shows no  evidence of DVT bilaterally.  The study also shows reflux in the distal right great saphenous vein.  The left great saphenous vein also appears ablated as expected.  Distal reflux noted in the left small saphenous vein as well.    Past Medical History:   Diagnosis Date    BMI 45.0-49.9, adult 06/20/2018    Cervicalgia 04/07/2021    Diabetes mellitus, type 2     DJD (degenerative joint disease) 03/15/2019    Edema 01/27/2021    Essential hypertension 04/07/2021    GERD (gastroesophageal reflux disease) 01/13/2021    Hyperlipidemia     Hypertension     Low back pain 06/19/2019    Osteoporosis     Pain in right shoulder 04/07/2021    Tachycardia 04/07/2021        Past Surgical History:   Procedure Laterality Date    CARPAL TUNNEL RELEASE Left 2/17/2022    Procedure: RELEASE, CARPAL TUNNEL;  Surgeon: Wilber Crocker MD;  Location: [facility] OR;  Service: Orthopedics;  Laterality: Left;    PATELLA TENDONS REPAIR Bilateral     RADIOFREQUENCY ABLATION Left 2/11/2022    Procedure: Left GSV RF Ablation;  Surgeon: Collins Canada DO;  Location: [facility] OR;  Service: Vascular;  Laterality: Left;    TORN MENISCUS Left     ULNAR NERVE TRANSPOSITION Left 2/17/2022    Procedure: TRANSPOSITION, NERVE, ULNAR;  Surgeon: Wilber Crocker MD;  Location: [facility] OR;  Service: Orthopedics;  Laterality: Left;    WRIST FRACTURE SURGERY Left        Social History     Tobacco Use   Smoking Status Never   Smokeless Tobacco Never         Current Outpatient Medications:     amLODIPine (NORVASC) 10 MG tablet, Take 1 tablet (10 mg total) by mouth once daily., Disp: 90 tablet, Rfl: 1    aspirin (ECOTRIN) 81 MG EC tablet, Take 1 tablet (81 mg total) by mouth 2 (two) times daily., Disp: 90 tablet, Rfl: 2    atorvastatin (LIPITOR) 20 MG tablet, TAKE 1 TABLET EVERY EVENING, Disp: 90 tablet, Rfl: 1    diclofenac (VOLTAREN) 75 MG EC tablet, Take 75 mg by mouth 2 (two) times daily., Disp: , Rfl:     ergocalciferol (ERGOCALCIFEROL) 50,000  unit Cap, Take 1 capsule by mouth weekly for 12 weeks then take 1 capsule monthly after that, Disp: 12 capsule, Rfl: 2    esomeprazole (NEXIUM) 20 MG capsule, Take 1 capsule (20 mg total) by mouth before breakfast., Disp: 30 capsule, Rfl: 11    gabapentin (NEURONTIN) 100 MG capsule, Take 1 capsule (100 mg total) by mouth 3 (three) times daily., Disp: 90 capsule, Rfl: 5    hydroCHLOROthiazide (MICROZIDE) 12.5 mg capsule, , Disp: , Rfl:     HYDROcodone-acetaminophen (NORCO) 5-325 mg per tablet, Take 1 tablet by mouth 3 (three) times daily., Disp: , Rfl:     metFORMIN (GLUCOPHAGE) 500 MG tablet, TAKE 1 TABLET DAILY WITH   BREAKFAST, Disp: 90 tablet, Rfl: 1    omeprazole (PRILOSEC) 40 MG capsule, , Disp: , Rfl:     potassium chloride SA (K-DUR,KLOR-CON) 20 MEQ tablet, Take 1 tablet (20 mEq total) by mouth once daily., Disp: 30 tablet, Rfl: 5    tadalafiL (CIALIS) 5 MG tablet, Take one tablet prn before sexual activity; effects may last 36 hours. Max dose is 20 mg/dose up to 1 dose/24 hours., Disp: 10 tablet, Rfl: 1    timolol maleate 0.5% (TIMOPTIC) 0.5 % Drop, PUT 1 DROP INTO AFFECTED EYE(S) EVERY MORNING, Disp: , Rfl:     cetirizine (ZYRTEC) 10 MG tablet, Take 1 tablet (10 mg total) by mouth once daily. (Patient not taking: Reported on 6/28/2023), Disp: 30 tablet, Rfl: 11    mineral oil-hydrophil petrolat (AQUAPHOR) Oint, Apply topically as needed (dry skin)., Disp: 396 g, Rfl: 1    omega 3-dha-epa-fish oil (FISH OIL) 1,000 mg (120 mg-180 mg) Cap, Take 1 capsule by mouth once daily., Disp: 90 capsule, Rfl: 3    Review of Systems   Constitutional:  Negative for fatigue and fever.   Eyes:  Negative for pain.   Respiratory:  Negative for chest tightness and shortness of breath.    Cardiovascular:  Positive for leg swelling. Negative for chest pain.   Gastrointestinal:  Negative for abdominal pain.   Musculoskeletal:  Negative for leg pain.        Chronic darker skin changes bilaterally   Neurological:  Positive for  numbness. Negative for syncope.   Psychiatric/Behavioral:  Negative for sleep disturbance.         II. PHYSICAL EXAM     Physical Exam  Vitals reviewed.   Constitutional:       General: He is not in acute distress.     Appearance: He is obese.   HENT:      Head: Normocephalic.   Eyes:      Pupils: Pupils are equal, round, and reactive to light.   Cardiovascular:      Rate and Rhythm: Regular rhythm. Tachycardia present.   Pulmonary:      Effort: Pulmonary effort is normal.   Abdominal:      Palpations: Abdomen is soft.   Musculoskeletal:         General: No swelling. Normal range of motion.      Cervical back: Neck supple.      Left lower leg: Edema present.      Comments: Bilateral lower extremity edema with hyperpigmentation without evidence of cellulitis.   Skin:     General: Skin is warm and dry.   Neurological:      Mental Status: He is alert and oriented to person, place, and time.     07/24/2023 Measurements:  Trunk: 126 cm  Right thigh: 69 cm, calf: 46.5 cm, ankle: 33 cm  Left thigh: 69.5 cm, calf: 48 cm, ankle: 31.5 cm    III. ASSESSMENT & PLAN (MEDICAL DECISION MAKING)     1. Hyperpigmentation of skin    2. Edema, lower extremity    3. Leg pain, bilateral    4. Venous insufficiency    5. Secondary lymphedema      Assessment/Diagnosis and Plan:  CT venogram did not present any convincing evidence of venous structure or obstruction.  Previous Ultrasound of lower extremities reveals positive evidence of venous insufficiency in the right distal great saphenous vein and left small saphenous vein.  Left proximal great saphenous vein appears ablated.  Plan for conservative medical treatment at this time. The patient may benefit from endovenous ablation in the future.     - continue compression with 20-30mmHg Rx stockings.  - Therapeutic leg elevation.  - Calf pumping exercises.  - follow-up in 2 months.  - Patient with secondary lymphedema experiencing swelling, discomfort, and hyperplasia. Patient has been  complaint with compression of 20-30mmHg, elevation and a home exercise program for at least 4 weeks yet symptoms persists. Pain is also present with this edema. Due to the patients pain levels, they will not be able to tolerate the static compression applied by a basic pump. An advanced pump that sequentially inflates and deflates on chamber at a time will be a more appropriate treatment option.      Collins [place], DO

## 2023-07-26 ENCOUNTER — OFFICE VISIT (OUTPATIENT)
Dept: NEUROLOGY | Facility: CLINIC | Age: 53
End: 2023-07-26
Payer: COMMERCIAL

## 2023-07-26 VITALS
WEIGHT: 304 LBS | OXYGEN SATURATION: 98 % | HEIGHT: 68 IN | HEART RATE: 118 BPM | BODY MASS INDEX: 46.07 KG/M2 | SYSTOLIC BLOOD PRESSURE: 138 MMHG | DIASTOLIC BLOOD PRESSURE: 84 MMHG

## 2023-07-26 DIAGNOSIS — G56.22 ULNAR NERVE COMPRESSION, LEFT: ICD-10-CM

## 2023-07-26 DIAGNOSIS — G56.21 ULNAR NERVE COMPRESSION, RIGHT: ICD-10-CM

## 2023-07-26 DIAGNOSIS — G56.03 CARPAL TUNNEL SYNDROME ON BOTH SIDES: Primary | ICD-10-CM

## 2023-07-26 PROCEDURE — 1160F RVW MEDS BY RX/DR IN RCRD: CPT | Mod: ,,, | Performed by: NURSE PRACTITIONER

## 2023-07-26 PROCEDURE — 3079F PR MOST RECENT DIASTOLIC BLOOD PRESSURE 80-89 MM HG: ICD-10-PCS | Mod: ,,, | Performed by: NURSE PRACTITIONER

## 2023-07-26 PROCEDURE — 3079F DIAST BP 80-89 MM HG: CPT | Mod: ,,, | Performed by: NURSE PRACTITIONER

## 2023-07-26 PROCEDURE — 1159F PR MEDICATION LIST DOCUMENTED IN MEDICAL RECORD: ICD-10-PCS | Mod: ,,, | Performed by: NURSE PRACTITIONER

## 2023-07-26 PROCEDURE — 3075F PR MOST RECENT SYSTOLIC BLOOD PRESS GE 130-139MM HG: ICD-10-PCS | Mod: ,,, | Performed by: NURSE PRACTITIONER

## 2023-07-26 PROCEDURE — 99215 OFFICE O/P EST HI 40 MIN: CPT | Mod: PBBFAC | Performed by: NURSE PRACTITIONER

## 2023-07-26 PROCEDURE — 1160F PR REVIEW ALL MEDS BY PRESCRIBER/CLIN PHARMACIST DOCUMENTED: ICD-10-PCS | Mod: ,,, | Performed by: NURSE PRACTITIONER

## 2023-07-26 PROCEDURE — 3008F BODY MASS INDEX DOCD: CPT | Mod: ,,, | Performed by: NURSE PRACTITIONER

## 2023-07-26 PROCEDURE — 1159F MED LIST DOCD IN RCRD: CPT | Mod: ,,, | Performed by: NURSE PRACTITIONER

## 2023-07-26 PROCEDURE — 99212 PR OFFICE/OUTPT VISIT, EST, LEVL II, 10-19 MIN: ICD-10-PCS | Mod: S$PBB,,, | Performed by: NURSE PRACTITIONER

## 2023-07-26 PROCEDURE — 99212 OFFICE O/P EST SF 10 MIN: CPT | Mod: S$PBB,,, | Performed by: NURSE PRACTITIONER

## 2023-07-26 PROCEDURE — 3008F PR BODY MASS INDEX (BMI) DOCUMENTED: ICD-10-PCS | Mod: ,,, | Performed by: NURSE PRACTITIONER

## 2023-07-26 PROCEDURE — 3075F SYST BP GE 130 - 139MM HG: CPT | Mod: ,,, | Performed by: NURSE PRACTITIONER

## 2023-07-26 NOTE — PROGRESS NOTES
Subjective:       Patient ID: Papo Bell is a 52 y.o. male     Chief Complaint:    Chief Complaint   Patient presents with    Follow-up        Allergies:  Patient has no known allergies.    Current Medications:    Outpatient Encounter Medications as of 7/26/2023   Medication Sig Dispense Refill    amLODIPine (NORVASC) 10 MG tablet Take 1 tablet (10 mg total) by mouth once daily. 90 tablet 1    atorvastatin (LIPITOR) 20 MG tablet TAKE 1 TABLET EVERY EVENING 90 tablet 1    ergocalciferol (ERGOCALCIFEROL) 50,000 unit Cap Take 1 capsule by mouth weekly for 12 weeks then take 1 capsule monthly after that 12 capsule 2    esomeprazole (NEXIUM) 20 MG capsule Take 1 capsule (20 mg total) by mouth before breakfast. 30 capsule 11    gabapentin (NEURONTIN) 100 MG capsule Take 1 capsule (100 mg total) by mouth 3 (three) times daily. 90 capsule 5    potassium chloride SA (K-DUR,KLOR-CON) 20 MEQ tablet Take 1 tablet (20 mEq total) by mouth once daily. 30 tablet 5    timolol maleate 0.5% (TIMOPTIC) 0.5 % Drop PUT 1 DROP INTO AFFECTED EYE(S) EVERY MORNING      aspirin (ECOTRIN) 81 MG EC tablet Take 1 tablet (81 mg total) by mouth 2 (two) times daily. (Patient not taking: Reported on 7/26/2023) 90 tablet 2    cetirizine (ZYRTEC) 10 MG tablet Take 1 tablet (10 mg total) by mouth once daily. (Patient not taking: Reported on 6/28/2023) 30 tablet 11    diclofenac (VOLTAREN) 75 MG EC tablet Take 75 mg by mouth 2 (two) times daily.      hydroCHLOROthiazide (MICROZIDE) 12.5 mg capsule       HYDROcodone-acetaminophen (NORCO) 5-325 mg per tablet Take 1 tablet by mouth 3 (three) times daily.      metFORMIN (GLUCOPHAGE) 500 MG tablet TAKE 1 TABLET DAILY WITH   BREAKFAST (Patient not taking: Reported on 7/26/2023) 90 tablet 1    mineral oil-hydrophil petrolat (AQUAPHOR) Oint Apply topically as needed (dry skin). (Patient not taking: Reported on 7/26/2023) 396 g 1    omega 3-dha-epa-fish oil (FISH OIL) 1,000 mg (120 mg-180 mg) Cap Take 1  capsule by mouth once daily. 90 capsule 3    omeprazole (PRILOSEC) 40 MG capsule       tadalafiL (CIALIS) 5 MG tablet Take one tablet prn before sexual activity; effects may last 36 hours. Max dose is 20 mg/dose up to 1 dose/24 hours. (Patient not taking: Reported on 7/26/2023) 10 tablet 1     No facility-administered encounter medications on file as of 7/26/2023.       History of Present Illness  53 y/o AAM following in clinic for finding of bilateral carpal tunnel syndrome.    Has peripheral vascular disease, followed by Dr. Canada.    EMG/NCS done 11/15/21 showed moderately severe left and mild right carpal tunnel.  There was also noted mild to moderate bilateral ulnar nerve neuropathies as well.  He had release on the LUE done in early March of 2022.  I see the initial followup with Dr. Crocker but no followup since that time.  He reports the RUE is not giving him a lot of trouble so he is holding on surgery at this time.  He is doing quite well, only using the neurontin about 3 days out of the week for his symptoms.  Is able to do ADLs without difficulty    He is prescribed neurontin 100mg tid to take PRN, maybe about 3 days of the week.         Review of Systems  Review of Systems   Constitutional:  Negative for diaphoresis and fever.   HENT:  Negative for congestion, hearing loss and tinnitus.    Eyes:  Negative for blurred vision, double vision, photophobia, discharge and redness.   Respiratory:  Negative for cough and shortness of breath.    Cardiovascular:  Negative for chest pain.   Gastrointestinal:  Negative for abdominal pain, nausea and vomiting.   Musculoskeletal:  Negative for back pain, joint pain, myalgias and neck pain.   Skin:  Negative for itching and rash.   Neurological:  Positive for tingling. Negative for dizziness, tremors, sensory change, speech change, focal weakness, seizures, loss of consciousness, weakness and headaches.   Psychiatric/Behavioral:  Negative for depression,  hallucinations and memory loss. The patient does not have insomnia.    All other systems reviewed and are negative.   Objective:     NEUROLOGICAL EXAMINATION:     MENTAL STATUS   Oriented to person, place, and time.   Registration: recalls 3 of 3 objects. Recall at 5 minutes: recalls 3 of 3 objects.   Attention: normal. Concentration: normal.   Speech: speech is normal   Level of consciousness: alert  Knowledge: good and consistent with education.   Normal comprehension.     CRANIAL NERVES     CN II   Visual fields full to confrontation.   Visual acuity: normal  Right visual field deficit: none  Left visual field deficit: none     CN III, IV, VI   Pupils are equal, round, and reactive to light.  Extraocular motions are normal.   Right pupil: Size: 3 mm. Shape: regular. Reactivity: brisk. Consensual response: intact. Accommodation: intact.   Left pupil: Size: 3 mm. Shape: regular. Reactivity: brisk. Consensual response: intact. Accommodation: intact.   CN III: no CN III palsy  CN VI: no CN VI palsy  Nystagmus: none   Diplopia: none  Upgaze: normal  Downgaze: normal  Conjugate gaze: present  Vestibulo-ocular reflex: present    CN V   Facial sensation intact.   Right facial sensation deficit: none  Left facial sensation deficit: none  Right corneal reflex: normal  Left corneal reflex: normal    CN VII   Facial expression full, symmetric.   Right facial weakness: none  Left facial weakness: none  Right taste: normal  Left taste: normal    CN VIII   CN VIII normal.   Hearing: intact    CN IX, X   CN IX normal.   CN X normal.   Palate: symmetric    CN XI   CN XI normal.   Right sternocleidomastoid strength: normal  Left sternocleidomastoid strength: normal  Right trapezius strength: normal  Left trapezius strength: normal    CN XII   CN XII normal.   Tongue: not atrophic  Fasciculations: absent  Tongue deviation: none    MOTOR EXAM   Muscle bulk: normal  Overall muscle tone: normal  Right arm tone: normal  Left arm tone:  normal  Right arm pronator drift: absent  Left arm pronator drift: absent  Right leg tone: normal  Left leg tone: normal    Strength   Right neck flexion: 5/5  Left neck flexion: 5/5  Right neck extension: 5/5  Left neck extension: 5/5  Right deltoid: 5/5  Left deltoid: 5/5  Right biceps: 5/5  Left biceps: 5/5  Right triceps: 5/5  Left triceps: 5/5  Right wrist flexion: /5  Left wrist flexion: 5/5  Right wrist extension: 5/5  Left wrist extension: /5  Right interossei: /5  Left interossei: /  Right iliopsoas:   Left iliopsoas:   Right quadriceps:   Left quadriceps:   Right hamstrin/5  Left hamstrin/5  Right anterior tibial:   Left anterior tibial:   Right posterior tibial:   Left posterior tibial:   Right gastroc:   Left gastroc:     REFLEXES     Reflexes   Right brachioradialis: 2+  Left brachioradialis: 2+  Right biceps: 2+  Left biceps: 2+  Right triceps: 2+  Left triceps: 2+  Right patellar: 2+  Left patellar: 2+  Right achilles: 2+  Left achilles: 2+  Right plantar: normal  Left plantar: normal  Right Moss: absent  Left Moss: absent  Right ankle clonus: absent  Left ankle clonus: absent  Right pendular knee jerk: absent  Left pendular knee jerk: absent    SENSORY EXAM   Light touch normal.   Right arm light touch: normal  Left arm light touch: normal  Right leg light touch: normal  Left leg light touch: normal  Vibration normal.   Right arm vibration: normal  Left arm vibration: normal  Right leg vibration: normal  Left leg vibration: normal  Proprioception normal.   Right arm proprioception: normal  Left arm proprioception: normal  Right leg proprioception: normal  Left leg proprioception: normal  Pinprick normal.   Right arm pinprick: normal  Left arm pinprick: normal  Right leg pinprick: normal  Left leg pinprick: normal  Graphesthesia: normal  Romberg: negative  Stereognosis: normal    GAIT AND COORDINATION     Gait  Gait: normal     Coordination   Finger to  nose coordination: normal  Heel to shin coordination: normal  Tandem walking coordination: normal    Tremor   Resting tremor: absent  Intention tremor: absent  Action tremor: absent     Physical Exam  Vitals and nursing note reviewed.   Constitutional:       Appearance: Normal appearance.   HENT:      Head: Normocephalic.   Eyes:      Extraocular Movements: EOM normal.      Pupils: Pupils are equal, round, and reactive to light.   Cardiovascular:      Rate and Rhythm: Normal rate and regular rhythm.      Pulses: Normal pulses.      Heart sounds: Normal heart sounds.   Pulmonary:      Effort: Pulmonary effort is normal.      Breath sounds: Normal breath sounds.   Musculoskeletal:         General: Normal range of motion.      Cervical back: Normal range of motion and neck supple.   Skin:     General: Skin is warm and dry.   Neurological:      General: No focal deficit present.      Mental Status: He is alert and oriented to person, place, and time.      Cranial Nerves: No cranial nerve deficit.      Sensory: No sensory deficit.      Motor: No weakness.      Coordination: Coordination normal. Finger-Nose-Finger Test, Heel to Shin Test and Romberg Test normal.      Gait: Gait is intact. Gait and tandem walk normal.      Deep Tendon Reflexes: Reflexes normal.      Reflex Scores:       Tricep reflexes are 2+ on the right side and 2+ on the left side.       Bicep reflexes are 2+ on the right side and 2+ on the left side.       Brachioradialis reflexes are 2+ on the right side and 2+ on the left side.       Patellar reflexes are 2+ on the right side and 2+ on the left side.       Achilles reflexes are 2+ on the right side and 2+ on the left side.  Psychiatric:         Mood and Affect: Mood normal.         Speech: Speech normal.         Behavior: Behavior normal.        Assessment:     Problem List Items Addressed This Visit          Neuro    Carpal tunnel syndrome on both sides - Primary    Ulnar nerve compression, left     Ulnar nerve compression, right            Primary Diagnosis and ICD10  Carpal tunnel syndrome on both sides [G56.03]    Plan:     Patient Instructions   1. Continue use of carpal tunnel braces as needed    2. Continue neurontin 100mg three times daily if symptoms again worsen    There are no discontinued medications.        Requested Prescriptions      No prescriptions requested or ordered in this encounter       No orders of the defined types were placed in this encounter.

## 2023-08-08 RX ORDER — AMLODIPINE BESYLATE 10 MG/1
10 TABLET ORAL
Qty: 90 TABLET | Refills: 1 | Status: SHIPPED | OUTPATIENT
Start: 2023-08-08 | End: 2023-09-19 | Stop reason: SDUPTHER

## 2023-09-19 DIAGNOSIS — G56.22 ULNAR NERVE COMPRESSION, LEFT: ICD-10-CM

## 2023-09-19 DIAGNOSIS — K21.9 GASTROESOPHAGEAL REFLUX DISEASE, UNSPECIFIED WHETHER ESOPHAGITIS PRESENT: ICD-10-CM

## 2023-09-19 DIAGNOSIS — G56.03 CARPAL TUNNEL SYNDROME ON BOTH SIDES: ICD-10-CM

## 2023-09-19 DIAGNOSIS — I10 ESSENTIAL HYPERTENSION: ICD-10-CM

## 2023-09-19 DIAGNOSIS — G56.21 ULNAR NERVE COMPRESSION, RIGHT: ICD-10-CM

## 2023-09-19 DIAGNOSIS — E87.6 HYPOKALEMIA: ICD-10-CM

## 2023-09-19 DIAGNOSIS — E11.9 TYPE 2 DIABETES MELLITUS WITHOUT COMPLICATION, WITHOUT LONG-TERM CURRENT USE OF INSULIN: ICD-10-CM

## 2023-09-19 RX ORDER — GABAPENTIN 100 MG/1
100 CAPSULE ORAL 3 TIMES DAILY
Qty: 90 CAPSULE | Refills: 5 | Status: SHIPPED | OUTPATIENT
Start: 2023-09-19 | End: 2024-09-18

## 2023-09-19 RX ORDER — POTASSIUM CHLORIDE 20 MEQ/1
20 TABLET, EXTENDED RELEASE ORAL DAILY
Qty: 90 TABLET | Refills: 0 | Status: SHIPPED | OUTPATIENT
Start: 2023-09-19 | End: 2024-01-03 | Stop reason: SDUPTHER

## 2023-09-19 RX ORDER — HYDROGEN PEROXIDE 3 %
20 SOLUTION, NON-ORAL MISCELLANEOUS
Qty: 90 CAPSULE | Refills: 0 | Status: SHIPPED | OUTPATIENT
Start: 2023-09-19 | End: 2024-01-03 | Stop reason: SDUPTHER

## 2023-09-19 RX ORDER — AMLODIPINE BESYLATE 10 MG/1
10 TABLET ORAL DAILY
Qty: 90 TABLET | Refills: 0 | Status: SHIPPED | OUTPATIENT
Start: 2023-09-19 | End: 2024-01-03 | Stop reason: SDUPTHER

## 2023-12-04 ENCOUNTER — OFFICE VISIT (OUTPATIENT)
Dept: VASCULAR SURGERY | Facility: CLINIC | Age: 53
End: 2023-12-04
Payer: COMMERCIAL

## 2023-12-04 VITALS
BODY MASS INDEX: 46.22 KG/M2 | SYSTOLIC BLOOD PRESSURE: 126 MMHG | WEIGHT: 304 LBS | RESPIRATION RATE: 17 BRPM | HEART RATE: 76 BPM | DIASTOLIC BLOOD PRESSURE: 76 MMHG

## 2023-12-04 DIAGNOSIS — M79.605 LEG PAIN, BILATERAL: ICD-10-CM

## 2023-12-04 DIAGNOSIS — I89.0 SECONDARY LYMPHEDEMA: ICD-10-CM

## 2023-12-04 DIAGNOSIS — L81.9 HYPERPIGMENTATION OF SKIN: Primary | ICD-10-CM

## 2023-12-04 DIAGNOSIS — M79.604 LEG PAIN, BILATERAL: ICD-10-CM

## 2023-12-04 DIAGNOSIS — R60.0 EDEMA, LOWER EXTREMITY: ICD-10-CM

## 2023-12-04 PROCEDURE — 3074F SYST BP LT 130 MM HG: CPT | Mod: ,,, | Performed by: FAMILY MEDICINE

## 2023-12-04 PROCEDURE — 99213 OFFICE O/P EST LOW 20 MIN: CPT | Mod: S$PBB,,, | Performed by: FAMILY MEDICINE

## 2023-12-04 PROCEDURE — 1159F PR MEDICATION LIST DOCUMENTED IN MEDICAL RECORD: ICD-10-PCS | Mod: ,,, | Performed by: FAMILY MEDICINE

## 2023-12-04 PROCEDURE — 1160F RVW MEDS BY RX/DR IN RCRD: CPT | Mod: ,,, | Performed by: FAMILY MEDICINE

## 2023-12-04 PROCEDURE — 99214 OFFICE O/P EST MOD 30 MIN: CPT | Mod: PBBFAC | Performed by: FAMILY MEDICINE

## 2023-12-04 PROCEDURE — 3008F BODY MASS INDEX DOCD: CPT | Mod: ,,, | Performed by: FAMILY MEDICINE

## 2023-12-04 PROCEDURE — 3074F PR MOST RECENT SYSTOLIC BLOOD PRESSURE < 130 MM HG: ICD-10-PCS | Mod: ,,, | Performed by: FAMILY MEDICINE

## 2023-12-04 PROCEDURE — 99213 PR OFFICE/OUTPT VISIT, EST, LEVL III, 20-29 MIN: ICD-10-PCS | Mod: S$PBB,,, | Performed by: FAMILY MEDICINE

## 2023-12-04 PROCEDURE — 1159F MED LIST DOCD IN RCRD: CPT | Mod: ,,, | Performed by: FAMILY MEDICINE

## 2023-12-04 PROCEDURE — 3078F DIAST BP <80 MM HG: CPT | Mod: ,,, | Performed by: FAMILY MEDICINE

## 2023-12-04 PROCEDURE — 1160F PR REVIEW ALL MEDS BY PRESCRIBER/CLIN PHARMACIST DOCUMENTED: ICD-10-PCS | Mod: ,,, | Performed by: FAMILY MEDICINE

## 2023-12-04 PROCEDURE — 3078F PR MOST RECENT DIASTOLIC BLOOD PRESSURE < 80 MM HG: ICD-10-PCS | Mod: ,,, | Performed by: FAMILY MEDICINE

## 2023-12-04 PROCEDURE — 3008F PR BODY MASS INDEX (BMI) DOCUMENTED: ICD-10-PCS | Mod: ,,, | Performed by: FAMILY MEDICINE

## 2023-12-04 NOTE — PROGRESS NOTES
[facility] CLINIC NOTE    Patient ID: Papo Bell is a 53 y.o. male.    I. HISTORY     Chief Complaint:   Chief Complaint   Patient presents with    Leg Swelling     Room 3. 4M TACTILE       HPI: Papo Bell is a 53 y.o. male who presents today for follow-up after receiving his basic tactile lymphatic pump device proximally 3 weeks ago.  The patient states that the lymphatic pump is working well and it has helped to control his daily swelling.  He states he normally uses the pump after getting off work, especially after a long shift.  He is used this in combination with his 20-30 millimeter of mercury compression stockings in his drastically helped his overall leg swelling and leg discomfort.    No signs of active cellulitis or open ulceration.    Clinical summary:  Patient seen recently as a referral from [facility] on 03/07/2023 for bilateral lower extremity swelling and pain.  Patient has a history of chronic venous insufficiency with ablation of the left great saphenous vein proximally 1 year ago.  His last clinic visit was proximally 1 year ago at his one-month follow-up and he did not follow up again after that.  The patient states things were good referral while following his procedure and he began having more swelling and discomfort over the past several months.  He states that he is continued to wear compression socks daily.  He also had a right total knee replacement since last visit.  No open ulcers. No history of DVT.  He also denies diagnosis of BEVERLY or CHF.    Vein procedure:   Lt. GSV RF ablation 2-    Bilateral complete venous reflux study 03/20/2023, shows no evidence of DVT bilaterally.  The study also shows reflux in the distal right great saphenous vein.  The left great saphenous vein also appears ablated as expected.  Distal reflux noted in the left small saphenous vein as well.    CT venogram with contrast on 07/18/2023 shows no evidence of venous occlusion or narrowing.   Left-sided splenorenal varices and shunting is present.  Moderate size hiatal hernia.    Past Medical History:   Diagnosis Date    BMI 45.0-49.9, adult 06/20/2018    Cervicalgia 04/07/2021    Diabetes mellitus, type 2     DJD (degenerative joint disease) 03/15/2019    Edema 01/27/2021    Essential hypertension 04/07/2021    GERD (gastroesophageal reflux disease) 01/13/2021    Hyperlipidemia     Hypertension     Low back pain 06/19/2019    Osteoporosis     Pain in right shoulder 04/07/2021    Tachycardia 04/07/2021        Past Surgical History:   Procedure Laterality Date    CARPAL TUNNEL RELEASE Left 2/17/2022    Procedure: RELEASE, CARPAL TUNNEL;  Surgeon: Wilber Crocker MD;  Location: [facility] OR;  Service: Orthopedics;  Laterality: Left;    PATELLA TENDONS REPAIR Bilateral     RADIOFREQUENCY ABLATION Left 2/11/2022    Procedure: Left GSV RF Ablation;  Surgeon: Collins Canada DO;  Location: [facility] OR;  Service: Vascular;  Laterality: Left;    TORN MENISCUS Left     ULNAR NERVE TRANSPOSITION Left 2/17/2022    Procedure: TRANSPOSITION, NERVE, ULNAR;  Surgeon: Wilber Crocker MD;  Location: [facility] OR;  Service: Orthopedics;  Laterality: Left;    WRIST FRACTURE SURGERY Left        Social History     Tobacco Use   Smoking Status Never   Smokeless Tobacco Never         Current Outpatient Medications:     aspirin (ECOTRIN) 81 MG EC tablet, Take 1 tablet (81 mg total) by mouth 2 (two) times daily., Disp: 90 tablet, Rfl: 2    diclofenac (VOLTAREN) 75 MG EC tablet, Take 75 mg by mouth 2 (two) times daily., Disp: , Rfl:     ergocalciferol (ERGOCALCIFEROL) 50,000 unit Cap, Take 1 capsule by mouth weekly for 12 weeks then take 1 capsule monthly after that, Disp: 12 capsule, Rfl: 2    gabapentin (NEURONTIN) 100 MG capsule, Take 1 capsule (100 mg total) by mouth 3 (three) times daily., Disp: 90 capsule, Rfl: 5    hydroCHLOROthiazide (MICROZIDE) 12.5 mg capsule, , Disp: , Rfl:     HYDROcodone-acetaminophen  (NORCO) 5-325 mg per tablet, Take 1 tablet by mouth 3 (three) times daily., Disp: , Rfl:     mineral oil-hydrophil petrolat (AQUAPHOR) Oint, Apply topically as needed (dry skin)., Disp: 396 g, Rfl: 1    timolol maleate 0.5% (TIMOPTIC) 0.5 % Drop, PUT 1 DROP INTO AFFECTED EYE(S) EVERY MORNING, Disp: , Rfl:     amLODIPine (NORVASC) 10 MG tablet, TAKE 1 TABLET BY MOUTH EVERYDAY FOR BLOOD PRESSURE., Disp: 7 tablet, Rfl: 0    atorvastatin (LIPITOR) 80 MG tablet, Take 1 tablet (80 mg total) by mouth once daily., Disp: 90 tablet, Rfl: 3    esomeprazole (NEXIUM) 20 MG capsule, Take 1 capsule (20 mg total) by mouth before breakfast., Disp: 90 capsule, Rfl: 0    omega 3-dha-epa-fish oil (FISH OIL) 1,000 mg (120 mg-180 mg) Cap, Take 1 capsule by mouth once daily., Disp: 90 capsule, Rfl: 3    potassium chloride SA (K-DUR,KLOR-CON) 20 MEQ tablet, Take 1 tablet (20 mEq total) by mouth 2 (two) times daily., Disp: 180 tablet, Rfl: 2    tadalafiL (CIALIS) 5 MG tablet, Take one tablet prn before sexual activity; effects may last 36 hours. Max dose is 20 mg/dose up to 1 dose/24 hours., Disp: 10 tablet, Rfl: 1    Review of Systems   Constitutional:  Negative for fatigue and fever.   Eyes:  Negative for pain.   Respiratory:  Negative for chest tightness and shortness of breath.    Cardiovascular:  Positive for leg swelling. Negative for chest pain.   Gastrointestinal:  Negative for abdominal pain.   Musculoskeletal:  Negative for leg pain.        Chronic darker skin changes bilaterally   Neurological:  Positive for numbness. Negative for syncope.   Psychiatric/Behavioral:  Negative for sleep disturbance.           II. PHYSICAL EXAM     Physical Exam  Vitals reviewed.   Constitutional:       General: He is not in acute distress.     Appearance: He is obese.   HENT:      Head: Normocephalic.   Eyes:      Pupils: Pupils are equal, round, and reactive to light.   Cardiovascular:      Rate and Rhythm: Regular rhythm. Tachycardia present.    Pulmonary:      Effort: Pulmonary effort is normal.   Abdominal:      Palpations: Abdomen is soft.   Musculoskeletal:         General: No swelling. Normal range of motion.      Cervical back: Neck supple.      Left lower leg: Edema present.      Comments: Bilateral lower extremity edema with hyperpigmentation without evidence of cellulitis.   Skin:     General: Skin is warm and dry.   Neurological:      Mental Status: He is alert and oriented to person, place, and time.         III. ASSESSMENT & PLAN (MEDICAL DECISION MAKING)     1. Hyperpigmentation of skin    2. Edema, lower extremity    3. Leg pain, bilateral    4. Secondary lymphedema        Assessment/Diagnosis and Plan:  Previous CT venogram did not present any convincing evidence of venous structure or obstruction.  Previous Ultrasound of lower extremities reveals positive evidence of venous insufficiency in the right distal great saphenous vein and left small saphenous vein.  Left proximal great saphenous vein appears ablated.  Plan for conservative medical treatment at this time. The patient may benefit from endovenous ablation in the future.     - continue compression with 20-30mmHg Rx stockings.  - Therapeutic leg elevation.  - Calf pumping exercises.  - follow-up in 3 months.    - Patient received [employer]  ENTRE pump on 11/14/23 and has continued daily use. He has continued to be compliant with compression of 20-30mmHg, elevation and a home exercise program for months however his symptoms persist. He has used the Entre system daily which is a leg-only basic pump that applies 30mmHg of pressure to his lower extremities since November 2023. He continues to have significant swelling in his legs and he has noticed an increased in swelling in his lower abdomen,chest and trunk. An abdominal measurement was taken on 10/24/23 and was 120 cm. Another abdominal measurement was taken on 2/28/24 and it was 128cm. After using the FlexiTouch, the  abdominal measurement decreased to 127cm. I am recommending the FlexiTouch which is an advanced pump that includes a truncal component to properly decongest both lower extremities as well as the hips/abdomen. I am recommending that he use this pump 1-2 times per day in addition to continuing conservative therapies to better manage his swelling long term.      Collins Canada, DO

## 2023-12-20 PROBLEM — M25.561 RIGHT KNEE PAIN: Status: RESOLVED | Noted: 2022-10-24 | Resolved: 2023-12-20

## 2024-01-03 ENCOUNTER — OFFICE VISIT (OUTPATIENT)
Dept: PRIMARY CARE CLINIC | Facility: CLINIC | Age: 54
End: 2024-01-03
Payer: COMMERCIAL

## 2024-01-03 VITALS
RESPIRATION RATE: 20 BRPM | HEIGHT: 68 IN | HEART RATE: 111 BPM | TEMPERATURE: 99 F | OXYGEN SATURATION: 97 % | DIASTOLIC BLOOD PRESSURE: 72 MMHG | BODY MASS INDEX: 46.25 KG/M2 | SYSTOLIC BLOOD PRESSURE: 134 MMHG | WEIGHT: 305.19 LBS

## 2024-01-03 DIAGNOSIS — I10 ESSENTIAL HYPERTENSION: ICD-10-CM

## 2024-01-03 DIAGNOSIS — E87.6 HYPOKALEMIA: ICD-10-CM

## 2024-01-03 DIAGNOSIS — N52.9 ERECTILE DYSFUNCTION, UNSPECIFIED ERECTILE DYSFUNCTION TYPE: ICD-10-CM

## 2024-01-03 DIAGNOSIS — Z11.59 NEED FOR HEPATITIS C SCREENING TEST: ICD-10-CM

## 2024-01-03 DIAGNOSIS — Z12.5 SCREENING FOR PROSTATE CANCER: ICD-10-CM

## 2024-01-03 DIAGNOSIS — I10 PRIMARY HYPERTENSION: ICD-10-CM

## 2024-01-03 DIAGNOSIS — E78.49 OTHER HYPERLIPIDEMIA: ICD-10-CM

## 2024-01-03 DIAGNOSIS — R00.0 TACHYCARDIA: ICD-10-CM

## 2024-01-03 DIAGNOSIS — K21.9 GASTROESOPHAGEAL REFLUX DISEASE, UNSPECIFIED WHETHER ESOPHAGITIS PRESENT: ICD-10-CM

## 2024-01-03 DIAGNOSIS — Z12.11 SCREEN FOR COLON CANCER: ICD-10-CM

## 2024-01-03 DIAGNOSIS — E11.9 TYPE 2 DIABETES MELLITUS WITHOUT COMPLICATION, WITHOUT LONG-TERM CURRENT USE OF INSULIN: Primary | ICD-10-CM

## 2024-01-03 LAB
ALBUMIN SERPL BCP-MCNC: 3.6 G/DL (ref 3.5–5)
ALBUMIN/GLOB SERPL: 0.8 {RATIO}
ALP SERPL-CCNC: 75 U/L (ref 45–115)
ALT SERPL W P-5'-P-CCNC: 35 U/L (ref 16–61)
ANION GAP SERPL CALCULATED.3IONS-SCNC: 18 MMOL/L (ref 7–16)
AST SERPL W P-5'-P-CCNC: 59 U/L (ref 15–37)
BASOPHILS # BLD AUTO: 0.03 K/UL (ref 0–0.2)
BASOPHILS NFR BLD AUTO: 0.5 % (ref 0–1)
BILIRUB SERPL-MCNC: 0.9 MG/DL (ref ?–1.2)
BUN SERPL-MCNC: 10 MG/DL (ref 7–18)
BUN/CREAT SERPL: 9 (ref 6–20)
CALCIUM SERPL-MCNC: 7.6 MG/DL (ref 8.5–10.1)
CHLORIDE SERPL-SCNC: 103 MMOL/L (ref 98–107)
CHOLEST SERPL-MCNC: 318 MG/DL (ref 0–200)
CHOLEST/HDLC SERPL: 3.8 {RATIO}
CO2 SERPL-SCNC: 22 MMOL/L (ref 21–32)
CREAT SERPL-MCNC: 1.14 MG/DL (ref 0.7–1.3)
CREAT UR-MCNC: 448 MG/DL (ref 39–259)
DIFFERENTIAL METHOD BLD: ABNORMAL
EGFR (NO RACE VARIABLE) (RUSH/TITUS): 76 ML/MIN/1.73M2
EOSINOPHIL # BLD AUTO: 0.01 K/UL (ref 0–0.5)
EOSINOPHIL NFR BLD AUTO: 0.2 % (ref 1–4)
ERYTHROCYTE [DISTWIDTH] IN BLOOD BY AUTOMATED COUNT: 14.3 % (ref 11.5–14.5)
EST. AVERAGE GLUCOSE BLD GHB EST-MCNC: 97 MG/DL
GLOBULIN SER-MCNC: 4.8 G/DL (ref 2–4)
GLUCOSE SERPL-MCNC: 121 MG/DL (ref 74–106)
HBA1C MFR BLD HPLC: 5 % (ref 4.5–6.6)
HCT VFR BLD AUTO: 38.8 % (ref 40–54)
HCV AB SER QL: NORMAL
HDLC SERPL-MCNC: 83 MG/DL (ref 40–60)
HGB BLD-MCNC: 13.5 G/DL (ref 13.5–18)
IMM GRANULOCYTES # BLD AUTO: 0.02 K/UL (ref 0–0.04)
IMM GRANULOCYTES NFR BLD: 0.3 % (ref 0–0.4)
LYMPHOCYTES # BLD AUTO: 2.29 K/UL (ref 1–4.8)
LYMPHOCYTES NFR BLD AUTO: 36.1 % (ref 27–41)
MCH RBC QN AUTO: 36.1 PG (ref 27–31)
MCHC RBC AUTO-ENTMCNC: 34.8 G/DL (ref 32–36)
MCV RBC AUTO: 103.7 FL (ref 80–96)
MICROALBUMIN UR-MCNC: 8.5 MG/DL (ref 0–2.8)
MICROALBUMIN/CREAT RATIO PNL UR: 19 MG/G (ref 0–30)
MONOCYTES # BLD AUTO: 0.51 K/UL (ref 0–0.8)
MONOCYTES NFR BLD AUTO: 8 % (ref 2–6)
MPC BLD CALC-MCNC: 11.6 FL (ref 9.4–12.4)
NEUTROPHILS # BLD AUTO: 3.48 K/UL (ref 1.8–7.7)
NEUTROPHILS NFR BLD AUTO: 54.9 % (ref 53–65)
NONHDLC SERPL-MCNC: 235 MG/DL
NRBC # BLD AUTO: 0.02 X10E3/UL
NRBC, AUTO (.00): 0.3 %
PLATELET # BLD AUTO: 113 K/UL (ref 150–400)
POTASSIUM SERPL-SCNC: 3.1 MMOL/L (ref 3.5–5.1)
PROT SERPL-MCNC: 8.4 G/DL (ref 6.4–8.2)
PSA SERPL-MCNC: 0.47 NG/ML
RBC # BLD AUTO: 3.74 M/UL (ref 4.6–6.2)
SODIUM SERPL-SCNC: 140 MMOL/L (ref 136–145)
TRIGL SERPL-MCNC: 1145 MG/DL (ref 35–150)
TSH SERPL DL<=0.005 MIU/L-ACNC: 3.51 UIU/ML (ref 0.36–3.74)
VLDLC SERPL-MCNC: ABNORMAL MG/DL
WBC # BLD AUTO: 6.34 K/UL (ref 4.5–11)

## 2024-01-03 PROCEDURE — 83036 HEMOGLOBIN GLYCOSYLATED A1C: CPT | Mod: ,,, | Performed by: CLINICAL MEDICAL LABORATORY

## 2024-01-03 PROCEDURE — 80050 GENERAL HEALTH PANEL: CPT | Mod: ,,, | Performed by: CLINICAL MEDICAL LABORATORY

## 2024-01-03 PROCEDURE — 86803 HEPATITIS C AB TEST: CPT | Mod: ,,, | Performed by: CLINICAL MEDICAL LABORATORY

## 2024-01-03 PROCEDURE — 82043 UR ALBUMIN QUANTITATIVE: CPT | Mod: ,,, | Performed by: CLINICAL MEDICAL LABORATORY

## 2024-01-03 PROCEDURE — 3008F BODY MASS INDEX DOCD: CPT | Mod: CPTII,,, | Performed by: NURSE PRACTITIONER

## 2024-01-03 PROCEDURE — 1159F MED LIST DOCD IN RCRD: CPT | Mod: CPTII,,, | Performed by: NURSE PRACTITIONER

## 2024-01-03 PROCEDURE — 99214 OFFICE O/P EST MOD 30 MIN: CPT | Mod: ,,, | Performed by: NURSE PRACTITIONER

## 2024-01-03 PROCEDURE — 80061 LIPID PANEL: CPT | Mod: ,,, | Performed by: CLINICAL MEDICAL LABORATORY

## 2024-01-03 PROCEDURE — 82570 ASSAY OF URINE CREATININE: CPT | Mod: ,,, | Performed by: CLINICAL MEDICAL LABORATORY

## 2024-01-03 PROCEDURE — 3078F DIAST BP <80 MM HG: CPT | Mod: CPTII,,, | Performed by: NURSE PRACTITIONER

## 2024-01-03 PROCEDURE — 3075F SYST BP GE 130 - 139MM HG: CPT | Mod: CPTII,,, | Performed by: NURSE PRACTITIONER

## 2024-01-03 PROCEDURE — G0103 PSA SCREENING: HCPCS | Mod: ,,, | Performed by: CLINICAL MEDICAL LABORATORY

## 2024-01-03 RX ORDER — POTASSIUM CHLORIDE 20 MEQ/1
20 TABLET, EXTENDED RELEASE ORAL DAILY
Qty: 90 TABLET | Refills: 2 | Status: SHIPPED | OUTPATIENT
Start: 2024-01-03 | End: 2024-01-04

## 2024-01-03 RX ORDER — HYDROGEN PEROXIDE 3 %
20 SOLUTION, NON-ORAL MISCELLANEOUS
Qty: 90 CAPSULE | Refills: 0 | Status: SHIPPED | OUTPATIENT
Start: 2024-01-03

## 2024-01-03 RX ORDER — ATORVASTATIN CALCIUM 20 MG/1
20 TABLET, FILM COATED ORAL NIGHTLY
Qty: 90 TABLET | Refills: 2 | Status: SHIPPED | OUTPATIENT
Start: 2024-01-03 | End: 2024-01-04

## 2024-01-03 RX ORDER — TADALAFIL 5 MG/1
TABLET ORAL
Qty: 10 TABLET | Refills: 1 | Status: SHIPPED | OUTPATIENT
Start: 2024-01-03

## 2024-01-03 RX ORDER — AMLODIPINE BESYLATE 10 MG/1
10 TABLET ORAL DAILY
Qty: 90 TABLET | Refills: 2 | Status: SHIPPED | OUTPATIENT
Start: 2024-01-03 | End: 2024-01-17

## 2024-01-03 NOTE — PROGRESS NOTES
[facility]  Primary Care       PATIENT NAME: Papo Bell   : 1970    AGE: 53 y.o. DATE: 2024    MRN: [id number]        Reason for Visit / Chief Complaint:  Diabetes (Blood sugar 109), Gastroesophageal Reflux, Hypertension (Refill ALL medications ), Hyperlipidemia, Back Pain, and Leg Pain (Legs and feet have been tingling. Legs feel tired alot)     Subjective:     HPI: Patient here for routine check up, labs, med refills.     Patient state she has tingling to lower extremities. Patient has seen the vascular specialist; states he still sees him.     States he has right lower back pain and seems to cause the tingling to lower extremities to worsen.     States he checks his blood sugar about once a week. States it normally runs around .           Review of Systems: Review of Systems   Constitutional: Negative.  Negative for fever.   HENT: Negative.     Eyes: Negative.    Respiratory: Negative.  Negative for cough and shortness of breath.    Cardiovascular: Negative.  Negative for chest pain.   Gastrointestinal: Negative.    Endocrine: Negative.    Genitourinary: Negative.  Negative for dysuria.   Musculoskeletal: Negative.  Negative for gait problem.   Skin: Negative.  Negative for rash.   Allergic/Immunologic: Negative.    Neurological:  Negative for headaches.        Patient  states he has tingling to lower extremities; takes Gabapentin   Hematological: Negative.    Psychiatric/Behavioral: Negative.            Review of patient's allergies indicates:  No Known Allergies     Med List:  Current Outpatient Medications on File Prior to Visit   Medication Sig Dispense Refill    aspirin (ECOTRIN) 81 MG EC tablet Take 1 tablet (81 mg total) by mouth 2 (two) times daily. 90 tablet 2    diclofenac (VOLTAREN) 75 MG EC tablet Take 75 mg by mouth 2 (two) times daily.      ergocalciferol (ERGOCALCIFEROL) 50,000 unit Cap Take 1 capsule by mouth weekly for 12 weeks then take 1 capsule monthly  after that 12 capsule 2    gabapentin (NEURONTIN) 100 MG capsule Take 1 capsule (100 mg total) by mouth 3 (three) times daily. 90 capsule 5    mineral oil-hydrophil petrolat (AQUAPHOR) Oint Apply topically as needed (dry skin). 396 g 1    [DISCONTINUED] amLODIPine (NORVASC) 10 MG tablet Take 1 tablet (10 mg total) by mouth once daily. 90 tablet 0    [DISCONTINUED] atorvastatin (LIPITOR) 20 MG tablet TAKE 1 TABLET EVERY EVENING 90 tablet 1    [DISCONTINUED] esomeprazole (NEXIUM) 20 MG capsule Take 1 capsule (20 mg total) by mouth before breakfast. 90 capsule 0    [DISCONTINUED] potassium chloride SA (K-DUR,KLOR-CON) 20 MEQ tablet Take 1 tablet (20 mEq total) by mouth once daily. 90 tablet 0    [DISCONTINUED] tadalafiL (CIALIS) 5 MG tablet Take one tablet prn before sexual activity; effects may last 36 hours. Max dose is 20 mg/dose up to 1 dose/24 hours. 10 tablet 1    hydroCHLOROthiazide (MICROZIDE) 12.5 mg capsule       HYDROcodone-acetaminophen (NORCO) 5-325 mg per tablet Take 1 tablet by mouth 3 (three) times daily.      omega 3-dha-epa-fish oil (FISH OIL) 1,000 mg (120 mg-180 mg) Cap Take 1 capsule by mouth once daily. 90 capsule 3    timolol maleate 0.5% (TIMOPTIC) 0.5 % Drop PUT 1 DROP INTO AFFECTED EYE(S) EVERY MORNING      [DISCONTINUED] cetirizine (ZYRTEC) 10 MG tablet Take 1 tablet (10 mg total) by mouth once daily. (Patient not taking: Reported on 6/28/2023) 30 tablet 11    [DISCONTINUED] metFORMIN (GLUCOPHAGE) 500 MG tablet TAKE 1 TABLET DAILY WITH   BREAKFAST 90 tablet 1     No current facility-administered medications on file prior to visit.       Medical/Social/Family History:  Past Medical History:   Diagnosis Date    BMI 45.0-49.9, adult 06/20/2018    Cervicalgia 04/07/2021    Diabetes mellitus, type 2     DJD (degenerative joint disease) 03/15/2019    Edema 01/27/2021    Essential hypertension 04/07/2021    GERD (gastroesophageal reflux disease) 01/13/2021    Hyperlipidemia     Hypertension     Low  "back pain 06/19/2019    Osteoporosis     Pain in right shoulder 04/07/2021    Tachycardia 04/07/2021      Social History     Tobacco Use   Smoking Status Never   Smokeless Tobacco Never      Social History     Substance and Sexual Activity   Alcohol Use Yes       Family History   Problem Relation Age of Onset    Diabetes Mother         AGE 78    Diabetes Father         AGE 54    Hypertension Father     No Known Problems Brother         AGE 55    No Known Problems Brother         AGE 49    Hypertension Brother         AGE 47    No Known Problems Daughter     No Known Problems Daughter     No Known Problems Daughter     No Known Problems Son     No Known Problems Son       Past Surgical History:   Procedure Laterality Date    CARPAL TUNNEL RELEASE Left 2/17/2022    Procedure: RELEASE, CARPAL TUNNEL;  Surgeon: Wilber Crocker MD;  Location: [facility] OR;  Service: Orthopedics;  Laterality: Left;    PATELLA TENDONS REPAIR Bilateral     RADIOFREQUENCY ABLATION Left 2/11/2022    Procedure: Left GSV RF Ablation;  Surgeon: Collins Canada DO;  Location: [facility] OR;  Service: Vascular;  Laterality: Left;    TORN MENISCUS Left     ULNAR NERVE TRANSPOSITION Left 2/17/2022    Procedure: TRANSPOSITION, NERVE, ULNAR;  Surgeon: Wilber Crocker MD;  Location: [facility] OR;  Service: Orthopedics;  Laterality: Left;    WRIST FRACTURE SURGERY Left         There is no immunization history on file for this patient.       Objective:      Vitals:    01/03/24 1218   BP: 134/72   BP Location: Left arm   Patient Position: Sitting   BP Method: Large (Automatic)   Pulse: (!) 111   Resp: 20   Temp: 98.6 °F (37 °C)   TempSrc: Oral   SpO2: 97%   Weight: (!) 138.4 kg (305 lb 3.2 oz)   Height: 5' 8" (1.727 m)     Body mass index is 46.41 kg/m².     Physical Exam: Physical Exam  Constitutional:       General: He is not in acute distress.     Appearance: Normal appearance. He is not ill-appearing, toxic-appearing or diaphoretic. "   HENT:      Head: Normocephalic.      Mouth/Throat:      Mouth: Mucous membranes are moist.   Eyes:      Extraocular Movements: Extraocular movements intact.      Conjunctiva/sclera: Conjunctivae normal.      Pupils: Pupils are equal, round, and reactive to light.   Cardiovascular:      Rate and Rhythm: Regular rhythm. Tachycardia present.      Heart sounds: Normal heart sounds.   Pulmonary:      Effort: Pulmonary effort is normal. No respiratory distress.      Breath sounds: Normal breath sounds. No stridor. No wheezing, rhonchi or rales.   Abdominal:      General: Bowel sounds are normal.      Palpations: Abdomen is soft.   Musculoskeletal:         General: Normal range of motion.      Cervical back: Normal range of motion and neck supple.      Right lower leg: Edema present.      Left lower leg: Edema present.   Skin:     General: Skin is warm and dry.   Neurological:      General: No focal deficit present.      Mental Status: He is alert and oriented to person, place, and time.      Gait: Gait normal.   Psychiatric:         Mood and Affect: Mood normal.         Behavior: Behavior normal.          Protective Sensation (w/ 10 gram monofilament):  Right: Intact  Left: Intact    Visual Inspection:  Dry Skin -  Bilateral and thick toenails    Pedal Pulses:   Right: Present  Left: Present    Posterior Tibialis Pulses:   Right:Present  Left: Present        Assessment:          ICD-10-CM ICD-9-CM   1. Type 2 diabetes mellitus without complication, without long-term current use of insulin  E11.9 250.00   2. Other hyperlipidemia  E78.49 272.4   3. Primary hypertension  I10 401.9   4. Hypokalemia  E87.6 276.8   5. Gastroesophageal reflux disease, unspecified whether esophagitis present  K21.9 530.81   6. Erectile dysfunction, unspecified erectile dysfunction type  N52.9 607.84   7. Essential hypertension  I10 401.9   8. Screening for prostate cancer  Z12.5 V76.44   9. Need for hepatitis C screening test  Z11.59 V73.89    10. Screen for colon cancer  Z12.11 V76.51   11. Tachycardia  R00.0 785.0        Plan:       Type 2 diabetes mellitus without complication, without long-term current use of insulin  -     CBC Auto Differential; Future; Expected date: 01/03/2024  -     Comprehensive Metabolic Panel; Future; Expected date: 01/03/2024  -     Hemoglobin A1C; Future; Expected date: 01/03/2024  -     Microalbumin/Creatinine Ratio, Urine  -     Ambulatory referral/consult to Podiatry; Future; Expected date: 01/10/2024    Other hyperlipidemia  -     Lipid Panel  -     atorvastatin (LIPITOR) 20 MG tablet; Take 1 tablet (20 mg total) by mouth every evening.  Dispense: 90 tablet; Refill: 2    Primary hypertension  -     CBC Auto Differential; Future; Expected date: 01/03/2024  -     Comprehensive Metabolic Panel; Future; Expected date: 01/03/2024    Hypokalemia  -     potassium chloride SA (K-DUR,KLOR-CON) 20 MEQ tablet; Take 1 tablet (20 mEq total) by mouth once daily.  Dispense: 90 tablet; Refill: 2    Gastroesophageal reflux disease, unspecified whether esophagitis present  -     esomeprazole (NEXIUM) 20 MG capsule; Take 1 capsule (20 mg total) by mouth before breakfast.  Dispense: 90 capsule; Refill: 0    Erectile dysfunction, unspecified erectile dysfunction type  -     tadalafiL (CIALIS) 5 MG tablet; Take one tablet prn before sexual activity; effects may last 36 hours. Max dose is 20 mg/dose up to 1 dose/24 hours.  Dispense: 10 tablet; Refill: 1    Essential hypertension  -     amLODIPine (NORVASC) 10 MG tablet; Take 1 tablet (10 mg total) by mouth once daily.  Dispense: 90 tablet; Refill: 2    Screening for prostate cancer  -     PSA, Screening; Future; Expected date: 01/03/2024    Need for hepatitis C screening test  -     Hepatitis C Antibody; Future; Expected date: 01/03/2024    Screen for colon cancer  -     Colonoscopy; Future; Expected date: 01/03/2024    Tachycardia  -     TSH; Future; Expected date: 01/03/2024      Recommend  to continue wearing compression socks.     New & refilled meds:  Requested Prescriptions     Signed Prescriptions Disp Refills    potassium chloride SA (K-DUR,KLOR-CON) 20 MEQ tablet 90 tablet 2     Sig: Take 1 tablet (20 mEq total) by mouth once daily.    esomeprazole (NEXIUM) 20 MG capsule 90 capsule 0     Sig: Take 1 capsule (20 mg total) by mouth before breakfast.    tadalafiL (CIALIS) 5 MG tablet 10 tablet 1     Sig: Take one tablet prn before sexual activity; effects may last 36 hours. Max dose is 20 mg/dose up to 1 dose/24 hours.    amLODIPine (NORVASC) 10 MG tablet 90 tablet 2     Sig: Take 1 tablet (10 mg total) by mouth once daily.    atorvastatin (LIPITOR) 20 MG tablet 90 tablet 2     Sig: Take 1 tablet (20 mg total) by mouth every evening.       Follow up in 6 months (on 7/3/2024), or if symptoms worsen or fail to improve, for Hypertension.     Patient Instructions   Patient Education       Controlling Your Blood Pressure Through Lifestyle   The Basics   Written by the doctors and editors at [facility]   What does my lifestyle have to do with my blood pressure? -- The things you do and the foods you eat have a big effect on your blood pressure and your overall health. Following the right lifestyle can:  Lower your blood pressure or keep you from getting high blood pressure in the first place  Reduce your need for blood pressure medicines  Make medicines for high blood pressure work better, if you do take them  Lower the chances that you'll have a heart attack or stroke, or develop kidney disease  Which lifestyle choices will help lower my blood pressure? -- Here's what you can do:  Lose weight (if you are overweight)  Choose a diet rich in fruits, vegetables, and low-fat dairy products, and low in meats, sweets, and refined grains  Eat less salt (sodium)  Do something active for at least 30 minutes a day on most days of the week  Limit the amount of alcohol you drink  If you have high blood pressure, it's  "also very important to quit smoking (if you smoke). Quitting smoking might not bring your blood pressure down. But it will lower the chances that you'll have a heart attack or stroke, and it will help you feel better and live longer.  Start low and go slow -- The changes listed above might sound like a lot, but don't worry. You don't have to change everything all at once. The key to improving your lifestyle is to "start low and go slow." Choose 1 small, specific thing to change and try doing it for a while. If it works for you, keep doing it until it becomes a habit. If it doesn't, don't give up. Choose something else to change and see how that goes.  Let's say, for example, that you would like to improve your diet. If you're the type of person who eats cheeseburgers and French fries all the time, you can't switch to eating just salads from one day to the next. When people try to make changes like that, they often fail. Then they feel frustrated and tend to give up. So instead of trying to change everything about your diet in 1 day, change 1 or 2 small things about your diet and give yourself time to get used to those changes. For instance, keep the cheeseburger but give up the French fries. Or eat the same things but cut your portions in half.  As you find things that you are able to change and stick with, keep adding new changes. In time, you will see that you can actually change a lot. You just have to get used to the changes slowly.  Lose weight -- When people think about losing weight, they sometimes make it more complicated than it really is. To lose weight, you have to either eat less or move more. If you do both of those things, it's even better. But there is no single weight-loss diet or activity that's better than any other. When it comes to weight loss, the most effective plan is the one that you'll stick with.  Improve your diet -- There is no single diet that is right for everyone. But in general, a " "healthy diet can include:  Lots of fruits, vegetables, and whole grains  Some beans, peas, lentils, chickpeas, and similar foods  Some nuts, such as walnuts, almonds, and peanuts  Fat-free or low-fat milk and milk products  Some fish  To have a healthy diet, it's also important to limit or avoid sugar, sweets, meats, and refined grains. (Refined grains are found in white bread, white rice, most forms of pasta, and most packaged "snack" foods.)  Reduce salt -- Many people think that eating a low-sodium diet means avoiding the salt shaker and not adding salt when cooking. The truth is, not adding salt at the table or when you cook will only help a little. Almost all of the sodium you eat is already in the food you buy at the grocery store or at restaurants (figure 1).  The most important thing you can do to cut down on sodium is to eat less processed food. That means that you should avoid most foods that are sold in cans, boxes, jars, and bags. You should also eat in restaurants less often.  To reduce the amount of sodium you get, buy fresh or fresh-frozen fruits, vegetables, and meats. (Fresh-frozen foods have had nothing added to them before freezing.) Then you can make meals at home, from scratch, with these ingredients.  As with the other changes, don't try to cut out salt all at once. Instead, choose 1 or 2 foods that have a lot of sodium and try to replace them with low-sodium choices. When you get used to those low-sodium options, find another food or 2 to change. Then keep going, until all the foods you eat are sodium-free or low in sodium.  Become more active -- If you want to be more active, you don't have to go to the gym or get all sweaty. It is possible to increase your activity level while doing everyday things you enjoy. Walking, gardening, and dancing are just a few of the things that you might try. As with all the other changes, the key is not to do too much too fast. If you don't do any activity now, " "start by walking for just a few minutes every other day. Do that for a few weeks. If you stick with it, try doing it for longer. But if you find that you don't like walking, try a different activity.  Drink less alcohol -- If you are a woman, do not have more than 1 "standard drink" of alcohol a day. If you are a man, do not have more than 2. A "standard drink" is:  A can or bottle that has 12 ounces of beer  A glass that has 5 ounces of wine  A shot that has 1.5 ounces of whiskey  Where should I start? -- If you want to improve your lifestyle, start by making the changes that you think would be easiest for you. If you used to exercise and just got out of the habit, maybe it would be easy for you to start exercising again. Or if you actually like cooking meals from scratch, maybe the first thing you should focus on is eating home-cooked meals that are low in sodium.  Whatever you tackle first, choose specific, realistic goals, and give yourself a deadline. For example, do not decide that you are going to "exercise more." Instead, decide that you are going to walk for 10 minutes on Monday, Wednesday, and Friday, and that you are going to do this for the next 2 weeks.  When lifestyle changes are too general, people have a hard time following through.  Now go. You can do it!  All topics are updated as new evidence becomes available and our peer review process is complete.  This topic retrieved from [employer] | Anywhere on: Sep 21, 2021.  Topic 18392 Version 8.0  Release: 29.4.2 - C29.263  © 2021 UpToDate, Inc. and/or its affiliates. All rights reserved.  figure 1: Sources of sodium in your diet     Graphic 19720 Version 2.0    Consumer Information Use and Disclaimer   This information is not specific medical advice and does not replace information you receive from your health care provider. This is only a brief summary of general information. It does NOT include all information about conditions, illnesses, injuries, tests, " "procedures, treatments, therapies, discharge instructions or life-style choices that may apply to you. You must talk with your health care provider for complete information about your health and treatment options. This information should not be used to decide whether or not to accept your health care provider's advice, instructions or recommendations. Only your health care provider has the knowledge and training to provide advice that is right for you. The use of this information is governed by the [employer] End User License Agreement, available at https://www.[employer]/en/solutions/Zealify/about/link.The use of [employer] content is governed by the [employer] Terms of Use. ©2021 UpToDate, Inc. All rights reserved.  Copyright   © 2021 UpToDate, Inc. and/or its affiliates. All rights reserved.  Patient Education       Diabetes and Diet   The Basics   Written by the doctors and editors at [facility]   Why is diet important in diabetes? -- Diet is important because it is part of diabetes treatment. Many people need to change what they eat and how much they eat to help treat their diabetes. It is important for people to treat their diabetes so that they:  Keep their blood sugar at or near a normal level  Prevent long-term problems, such as heart or kidney problems, that can happen in people with diabetes  Changing your diet can also help treat obesity, high blood pressure, and high cholesterol. These conditions can affect people with diabetes and can lead to future problems, such as heart attacks or strokes.  Who will work with me to change my diet? -- Your doctor or nurse will work with you to make a food plan to change your diet. They might also recommend that you work with a "dietitian." A dietitian is an expert on food and eating.  Do I need to eat at the same times every day? -- When and how often you should eat depends, in part, on the diabetes medicines you take. For example:  People who take about the same " "amount of insulin at the same time each day (called a "fixed regimen") should eat meals at the same times. This is also true for people who take pills that increase insulin levels, such as sulfonylureas. Eating meals at the same time every day helps prevent low blood sugar.  People who adjust the dose and timing of their insulin each day (called a "flexible regimen") do not always have to eat meals at the same time. That's because they can time their insulin dose for before they plan to eat, and also adjust the dose for how much they plan to eat.  People who take medicines that don't usually cause low blood sugar, such as metformin, don't have to eat meals at the same time every day.  What do I need to think about when planning what to eat? -- Our bodies break down the food we eat into small pieces called carbohydrates, proteins, and fats.  When planning what to eat, people with diabetes need to think about:  Carbohydrates (or "carbs") - Carbohydrates, which are sugars that our bodies use for energy, can raise a person's blood sugar level. Your doctor, nurse, or dietitian will tell you how many carbohydrates you should eat at each meal or snack. Foods that have carbohydrates include:  Bread, pasta, and rice  Vegetables and fruits  Dairy foods  Foods and drinks with added sugar  It is best to get your carbohydrates from fruits, vegetables, whole grains, and low-fat milk. It is best to avoid drinks with added sugar, like soda, juices, and sports drinks.   Protein - Your doctor, nurse, or dietitian will tell you how much protein you should eat each day. It is best to eat lean meats, fish, eggs, beans, peas, soy products, nuts, and seeds.  Fats - The type of fat you eat is more important than the amount of fat. "Saturated" and "trans" fats can increase your risk for heart problems, like a heart attack.  Foods that have saturated fats include meat, butter, cheese, and ice cream.  Foods that have trans fats include " "processed food with "partially hydrogenated oils" on the ingredient list. This may include fried foods, store bought cookies, muffins, pies, and cakes.  "Monounsaturated" and "polyunsaturated" fats are better for you. Foods with these types of fat include fish, avocado, olive oil, and nuts.  Calories - People need to eat a certain amount of calories each day to keep their weight the same. People who are overweight and want to lose weight need to eat fewer calories each day.  Fiber - Eating foods with a lot of fiber can help control a person's blood sugar level. Foods that have a lot of fiber include apples, green beans, peas, beans, lentils, nuts, oatmeal, and whole grains.  Salt - People who have high blood pressure should not eat foods that contain a lot of salt (also called sodium). People with high blood pressure should also eat healthy foods, such as fruits, vegetables, and low-fat dairy foods.  Alcohol - Having more than 1 drink (for women) or 2 drinks (for men) a day can raise blood sugar levels. Also, drinks that have fruit juice or soda in them can raise blood sugar levels.  What can I do if I need to lose weight? -- If you need to lose weight, you can:  Exercise - Try to get at least 30 minutes of physical activity a day, most days of the week. Even gentle exercise, like walking, is good for your health. Some people with diabetes need to change their medicine dose before they exercise. They might also need to check their blood sugar levels before and after exercising.  Eat fewer calories - Your doctor, nurse, or dietitian can tell you how many calories you should eat each day in order to lose weight.  If you are worried about your weight, size, or shape, talk with your doctor, nurse, or dietitian. They can help you make changes to improve your health.  Can I eat the same foods as my family? -- Yes. You do not need to eat special foods if you have diabetes. You and your family can eat the same foods. " Changing your diet is mostly about eating healthy foods and not eating too much.  What are the other parts of diabetes treatment? -- Besides changing your diet, the other parts of diabetes treatment are:  Exercise  Medicines  Some people with diabetes need to learn how to match their diet and exercise with their medicine dose. For example, people who use insulin might need to choose the dose of insulin they give themselves. To choose their dose, they need to think about:  What they plan to eat at the next meal  How much exercise they plan to do  What their blood sugar level is  If the diet and exercise do not match the medicine dose, a person's blood sugar level can get too low or too high. Blood sugar levels that are too low or too high can cause problems.  All topics are updated as new evidence becomes available and our peer review process is complete.  This topic retrieved from [employer] on: Sep 21, 2021.  Topic 60681 Version 7.0  Release: 29.4.2 - C29.263  © 2021 UpToDate, Inc. and/or its affiliates. All rights reserved.  Consumer Information Use and Disclaimer   This information is not specific medical advice and does not replace information you receive from your health care provider. This is only a brief summary of general information. It does NOT include all information about conditions, illnesses, injuries, tests, procedures, treatments, therapies, discharge instructions or life-style choices that may apply to you. You must talk with your health care provider for complete information about your health and treatment options. This information should not be used to decide whether or not to accept your health care provider's advice, instructions or recommendations. Only your health care provider has the knowledge and training to provide advice that is right for you. The use of this information is governed by the [employer] End User License Agreement, available at  https://www.BidThatProjecter.com/en/solutions/lexicomp/about/link.The use of [employer] content is governed by the [employer] Terms of Use. ©2021 UpToDate, Inc. All rights reserved.  Copyright   © 2021 UpToDate, Inc. and/or its affiliates. All rights reserved.           Signature: Huey Mtz DNP, DEZP-C

## 2024-01-03 NOTE — PATIENT INSTRUCTIONS
"Patient Education       Controlling Your Blood Pressure Through Lifestyle   The Basics   Written by the doctors and editors at [facility]   What does my lifestyle have to do with my blood pressure? -- The things you do and the foods you eat have a big effect on your blood pressure and your overall health. Following the right lifestyle can:  Lower your blood pressure or keep you from getting high blood pressure in the first place  Reduce your need for blood pressure medicines  Make medicines for high blood pressure work better, if you do take them  Lower the chances that you'll have a heart attack or stroke, or develop kidney disease  Which lifestyle choices will help lower my blood pressure? -- Here's what you can do:  Lose weight (if you are overweight)  Choose a diet rich in fruits, vegetables, and low-fat dairy products, and low in meats, sweets, and refined grains  Eat less salt (sodium)  Do something active for at least 30 minutes a day on most days of the week  Limit the amount of alcohol you drink  If you have high blood pressure, it's also very important to quit smoking (if you smoke). Quitting smoking might not bring your blood pressure down. But it will lower the chances that you'll have a heart attack or stroke, and it will help you feel better and live longer.  Start low and go slow -- The changes listed above might sound like a lot, but don't worry. You don't have to change everything all at once. The key to improving your lifestyle is to "start low and go slow." Choose 1 small, specific thing to change and try doing it for a while. If it works for you, keep doing it until it becomes a habit. If it doesn't, don't give up. Choose something else to change and see how that goes.  Let's say, for example, that you would like to improve your diet. If you're the type of person who eats cheeseburgers and French fries all the time, you can't switch to eating just salads from one day to the next. When people try to " "make changes like that, they often fail. Then they feel frustrated and tend to give up. So instead of trying to change everything about your diet in 1 day, change 1 or 2 small things about your diet and give yourself time to get used to those changes. For instance, keep the cheeseburger but give up the French fries. Or eat the same things but cut your portions in half.  As you find things that you are able to change and stick with, keep adding new changes. In time, you will see that you can actually change a lot. You just have to get used to the changes slowly.  Lose weight -- When people think about losing weight, they sometimes make it more complicated than it really is. To lose weight, you have to either eat less or move more. If you do both of those things, it's even better. But there is no single weight-loss diet or activity that's better than any other. When it comes to weight loss, the most effective plan is the one that you'll stick with.  Improve your diet -- There is no single diet that is right for everyone. But in general, a healthy diet can include:  Lots of fruits, vegetables, and whole grains  Some beans, peas, lentils, chickpeas, and similar foods  Some nuts, such as walnuts, almonds, and peanuts  Fat-free or low-fat milk and milk products  Some fish  To have a healthy diet, it's also important to limit or avoid sugar, sweets, meats, and refined grains. (Refined grains are found in white bread, white rice, most forms of pasta, and most packaged "snack" foods.)  Reduce salt -- Many people think that eating a low-sodium diet means avoiding the salt shaker and not adding salt when cooking. The truth is, not adding salt at the table or when you cook will only help a little. Almost all of the sodium you eat is already in the food you buy at the grocery store or at restaurants (figure 1).  The most important thing you can do to cut down on sodium is to eat less processed food. That means that you should " "avoid most foods that are sold in cans, boxes, jars, and bags. You should also eat in restaurants less often.  To reduce the amount of sodium you get, buy fresh or fresh-frozen fruits, vegetables, and meats. (Fresh-frozen foods have had nothing added to them before freezing.) Then you can make meals at home, from scratch, with these ingredients.  As with the other changes, don't try to cut out salt all at once. Instead, choose 1 or 2 foods that have a lot of sodium and try to replace them with low-sodium choices. When you get used to those low-sodium options, find another food or 2 to change. Then keep going, until all the foods you eat are sodium-free or low in sodium.  Become more active -- If you want to be more active, you don't have to go to the gym or get all sweaty. It is possible to increase your activity level while doing everyday things you enjoy. Walking, gardening, and dancing are just a few of the things that you might try. As with all the other changes, the key is not to do too much too fast. If you don't do any activity now, start by walking for just a few minutes every other day. Do that for a few weeks. If you stick with it, try doing it for longer. But if you find that you don't like walking, try a different activity.  Drink less alcohol -- If you are a woman, do not have more than 1 "standard drink" of alcohol a day. If you are a man, do not have more than 2. A "standard drink" is:  A can or bottle that has 12 ounces of beer  A glass that has 5 ounces of wine  A shot that has 1.5 ounces of whiskey  Where should I start? -- If you want to improve your lifestyle, start by making the changes that you think would be easiest for you. If you used to exercise and just got out of the habit, maybe it would be easy for you to start exercising again. Or if you actually like cooking meals from scratch, maybe the first thing you should focus on is eating home-cooked meals that are low in sodium.  Whatever you " "tackle first, choose specific, realistic goals, and give yourself a deadline. For example, do not decide that you are going to "exercise more." Instead, decide that you are going to walk for 10 minutes on Monday, Wednesday, and Friday, and that you are going to do this for the next 2 weeks.  When lifestyle changes are too general, people have a hard time following through.  Now go. You can do it!  All topics are updated as new evidence becomes available and our peer review process is complete.  This topic retrieved from [employer] on: Sep 21, 2021.  Topic 37034 Version 8.0  Release: 29.4.2 - C29.263  © 2021 UpToDate, Inc. and/or its affiliates. All rights reserved.  figure 1: Sources of sodium in your diet     Graphic 14295 Version 2.0    Consumer Information Use and Disclaimer   This information is not specific medical advice and does not replace information you receive from your health care provider. This is only a brief summary of general information. It does NOT include all information about conditions, illnesses, injuries, tests, procedures, treatments, therapies, discharge instructions or life-style choices that may apply to you. You must talk with your health care provider for complete information about your health and treatment options. This information should not be used to decide whether or not to accept your health care provider's advice, instructions or recommendations. Only your health care provider has the knowledge and training to provide advice that is right for you. The use of this information is governed by the [employer] End User License Agreement, available at https://www.Numedeon.Sierra House Cookies/en/solutions/AppyZoo/about/link.The use of [employer] content is governed by the [employer] Terms of Use. ©2021 UpToDate, Inc. All rights reserved.  Copyright   © 2021 UpToDate, Inc. and/or its affiliates. All rights reserved.  Patient Education       Diabetes and Diet   The Basics   Written by the doctors and editors " "at UpToDate   Why is diet important in diabetes? -- Diet is important because it is part of diabetes treatment. Many people need to change what they eat and how much they eat to help treat their diabetes. It is important for people to treat their diabetes so that they:  Keep their blood sugar at or near a normal level  Prevent long-term problems, such as heart or kidney problems, that can happen in people with diabetes  Changing your diet can also help treat obesity, high blood pressure, and high cholesterol. These conditions can affect people with diabetes and can lead to future problems, such as heart attacks or strokes.  Who will work with me to change my diet? -- Your doctor or nurse will work with you to make a food plan to change your diet. They might also recommend that you work with a "dietitian." A dietitian is an expert on food and eating.  Do I need to eat at the same times every day? -- When and how often you should eat depends, in part, on the diabetes medicines you take. For example:  People who take about the same amount of insulin at the same time each day (called a "fixed regimen") should eat meals at the same times. This is also true for people who take pills that increase insulin levels, such as sulfonylureas. Eating meals at the same time every day helps prevent low blood sugar.  People who adjust the dose and timing of their insulin each day (called a "flexible regimen") do not always have to eat meals at the same time. That's because they can time their insulin dose for before they plan to eat, and also adjust the dose for how much they plan to eat.  People who take medicines that don't usually cause low blood sugar, such as metformin, don't have to eat meals at the same time every day.  What do I need to think about when planning what to eat? -- Our bodies break down the food we eat into small pieces called carbohydrates, proteins, and fats.  When planning what to eat, people with diabetes " "need to think about:  Carbohydrates (or "carbs") - Carbohydrates, which are sugars that our bodies use for energy, can raise a person's blood sugar level. Your doctor, nurse, or dietitian will tell you how many carbohydrates you should eat at each meal or snack. Foods that have carbohydrates include:  Bread, pasta, and rice  Vegetables and fruits  Dairy foods  Foods and drinks with added sugar  It is best to get your carbohydrates from fruits, vegetables, whole grains, and low-fat milk. It is best to avoid drinks with added sugar, like soda, juices, and sports drinks.   Protein - Your doctor, nurse, or dietitian will tell you how much protein you should eat each day. It is best to eat lean meats, fish, eggs, beans, peas, soy products, nuts, and seeds.  Fats - The type of fat you eat is more important than the amount of fat. "Saturated" and "trans" fats can increase your risk for heart problems, like a heart attack.  Foods that have saturated fats include meat, butter, cheese, and ice cream.  Foods that have trans fats include processed food with "partially hydrogenated oils" on the ingredient list. This may include fried foods, store bought cookies, muffins, pies, and cakes.  "Monounsaturated" and "polyunsaturated" fats are better for you. Foods with these types of fat include fish, avocado, olive oil, and nuts.  Calories - People need to eat a certain amount of calories each day to keep their weight the same. People who are overweight and want to lose weight need to eat fewer calories each day.  Fiber - Eating foods with a lot of fiber can help control a person's blood sugar level. Foods that have a lot of fiber include apples, green beans, peas, beans, lentils, nuts, oatmeal, and whole grains.  Salt - People who have high blood pressure should not eat foods that contain a lot of salt (also called sodium). People with high blood pressure should also eat healthy foods, such as fruits, vegetables, and low-fat dairy " foods.  Alcohol - Having more than 1 drink (for women) or 2 drinks (for men) a day can raise blood sugar levels. Also, drinks that have fruit juice or soda in them can raise blood sugar levels.  What can I do if I need to lose weight? -- If you need to lose weight, you can:  Exercise - Try to get at least 30 minutes of physical activity a day, most days of the week. Even gentle exercise, like walking, is good for your health. Some people with diabetes need to change their medicine dose before they exercise. They might also need to check their blood sugar levels before and after exercising.  Eat fewer calories - Your doctor, nurse, or dietitian can tell you how many calories you should eat each day in order to lose weight.  If you are worried about your weight, size, or shape, talk with your doctor, nurse, or dietitian. They can help you make changes to improve your health.  Can I eat the same foods as my family? -- Yes. You do not need to eat special foods if you have diabetes. You and your family can eat the same foods. Changing your diet is mostly about eating healthy foods and not eating too much.  What are the other parts of diabetes treatment? -- Besides changing your diet, the other parts of diabetes treatment are:  Exercise  Medicines  Some people with diabetes need to learn how to match their diet and exercise with their medicine dose. For example, people who use insulin might need to choose the dose of insulin they give themselves. To choose their dose, they need to think about:  What they plan to eat at the next meal  How much exercise they plan to do  What their blood sugar level is  If the diet and exercise do not match the medicine dose, a person's blood sugar level can get too low or too high. Blood sugar levels that are too low or too high can cause problems.  All topics are updated as new evidence becomes available and our peer review process is complete.  This topic retrieved from [employer] on: Sep  21, 2021.  Topic 89163 Version 7.0  Release: 29.4.2 - C29.263  © 2021 UpToDate, Inc. and/or its affiliates. All rights reserved.  Consumer Information Use and Disclaimer   This information is not specific medical advice and does not replace information you receive from your health care provider. This is only a brief summary of general information. It does NOT include all information about conditions, illnesses, injuries, tests, procedures, treatments, therapies, discharge instructions or life-style choices that may apply to you. You must talk with your health care provider for complete information about your health and treatment options. This information should not be used to decide whether or not to accept your health care provider's advice, instructions or recommendations. Only your health care provider has the knowledge and training to provide advice that is right for you. The use of this information is governed by the [employer] End User License Agreement, available at https://www.RB-Doors.Magpower/en/solutions/Q-Sensei/about/link.The use of [employer] content is governed by the [employer] Terms of Use. ©2021 UpToDate, Inc. All rights reserved.  Copyright   © 2021 UpToDate, Inc. and/or its affiliates. All rights reserved.

## 2024-01-04 DIAGNOSIS — E78.49 OTHER HYPERLIPIDEMIA: Primary | ICD-10-CM

## 2024-01-04 DIAGNOSIS — E78.1 HYPERTRIGLYCERIDEMIA: ICD-10-CM

## 2024-01-04 DIAGNOSIS — E87.6 HYPOKALEMIA: ICD-10-CM

## 2024-01-04 RX ORDER — GLUCOSAM/CHONDRO/HERB 149/HYAL 750-100 MG
1 TABLET ORAL DAILY
Qty: 90 CAPSULE | Refills: 3 | Status: SHIPPED | OUTPATIENT
Start: 2024-01-04 | End: 2025-01-03

## 2024-01-04 RX ORDER — POTASSIUM CHLORIDE 20 MEQ/1
20 TABLET, EXTENDED RELEASE ORAL 2 TIMES DAILY
Qty: 180 TABLET | Refills: 2 | Status: SHIPPED | OUTPATIENT
Start: 2024-01-04 | End: 2024-09-30

## 2024-01-04 RX ORDER — TADALAFIL 5 MG/1
TABLET ORAL
Qty: 10 TABLET | Refills: 1 | OUTPATIENT
Start: 2024-01-04

## 2024-01-04 RX ORDER — ATORVASTATIN CALCIUM 80 MG/1
80 TABLET, FILM COATED ORAL DAILY
Qty: 90 TABLET | Refills: 3 | Status: SHIPPED | OUTPATIENT
Start: 2024-01-04 | End: 2025-01-03

## 2024-01-04 NOTE — TELEPHONE ENCOUNTER
----- Message from Huey Mtz DNP, FNP-C sent at 1/4/2024  3:25 PM CST -----  Please notify of results:    Triglycerides and cholesterol are elevated: patient needs to avoid all fried foods, highly processed foods, sugars. Ask patient if he is taking his lipitor and fish oil.    Lipitor will be increased to 80 mg po daily. Fish will also be sent to the pharmacy. Sent to [facility].     Needs to have lipid panel repeated in one month.     Potassium is low. Ask patient if he is taking his potassium. Potassium increased to 20 meq po twice a day. New med sent to [facility].

## 2024-01-05 DIAGNOSIS — N52.9 ERECTILE DYSFUNCTION, UNSPECIFIED ERECTILE DYSFUNCTION TYPE: ICD-10-CM

## 2024-01-12 DIAGNOSIS — I10 ESSENTIAL HYPERTENSION: ICD-10-CM

## 2024-01-17 RX ORDER — AMLODIPINE BESYLATE 10 MG/1
10 TABLET ORAL
Qty: 7 TABLET | Refills: 0 | Status: SHIPPED | OUTPATIENT
Start: 2024-01-17

## 2024-03-15 DIAGNOSIS — K21.9 GASTROESOPHAGEAL REFLUX DISEASE, UNSPECIFIED WHETHER ESOPHAGITIS PRESENT: ICD-10-CM

## 2024-03-18 RX ORDER — HYDROGEN PEROXIDE 3 %
20 SOLUTION, NON-ORAL MISCELLANEOUS
Qty: 90 CAPSULE | Refills: 0 | Status: SHIPPED | OUTPATIENT
Start: 2024-03-18 | End: 2024-05-30

## 2024-04-23 ENCOUNTER — OFFICE VISIT (OUTPATIENT)
Dept: PRIMARY CARE CLINIC | Facility: CLINIC | Age: 54
End: 2024-04-23
Payer: COMMERCIAL

## 2024-04-23 ENCOUNTER — HOSPITAL ENCOUNTER (OUTPATIENT)
Dept: RADIOLOGY | Facility: HOSPITAL | Age: 54
Discharge: HOME | End: 2024-04-23
Attending: NURSE PRACTITIONER
Payer: COMMERCIAL

## 2024-04-23 VITALS
BODY MASS INDEX: 47.74 KG/M2 | TEMPERATURE: 98 F | SYSTOLIC BLOOD PRESSURE: 141 MMHG | WEIGHT: 315 LBS | DIASTOLIC BLOOD PRESSURE: 93 MMHG | HEIGHT: 68 IN | OXYGEN SATURATION: 99 % | HEART RATE: 96 BPM | RESPIRATION RATE: 20 BRPM

## 2024-04-23 DIAGNOSIS — M25.571 ACUTE RIGHT ANKLE PAIN: ICD-10-CM

## 2024-04-23 DIAGNOSIS — R60.0 EDEMA, LOWER EXTREMITY: ICD-10-CM

## 2024-04-23 DIAGNOSIS — E78.1 HYPERTRIGLYCERIDEMIA: ICD-10-CM

## 2024-04-23 DIAGNOSIS — I10 PRIMARY HYPERTENSION: ICD-10-CM

## 2024-04-23 DIAGNOSIS — I89.0 SECONDARY LYMPHEDEMA: ICD-10-CM

## 2024-04-23 DIAGNOSIS — E87.6 HYPOKALEMIA: ICD-10-CM

## 2024-04-23 DIAGNOSIS — E11.9 TYPE 2 DIABETES MELLITUS WITHOUT COMPLICATION, WITHOUT LONG-TERM CURRENT USE OF INSULIN: ICD-10-CM

## 2024-04-23 DIAGNOSIS — E78.49 OTHER HYPERLIPIDEMIA: ICD-10-CM

## 2024-04-23 DIAGNOSIS — M25.571 ACUTE RIGHT ANKLE PAIN: Primary | ICD-10-CM

## 2024-04-23 PROCEDURE — 3077F SYST BP >= 140 MM HG: CPT | Mod: CPTII,,, | Performed by: NURSE PRACTITIONER

## 2024-04-23 PROCEDURE — 3080F DIAST BP >= 90 MM HG: CPT | Mod: CPTII,,, | Performed by: NURSE PRACTITIONER

## 2024-04-23 PROCEDURE — 3066F NEPHROPATHY DOC TX: CPT | Mod: CPTII,,, | Performed by: NURSE PRACTITIONER

## 2024-04-23 PROCEDURE — 1160F RVW MEDS BY RX/DR IN RCRD: CPT | Mod: CPTII,,, | Performed by: NURSE PRACTITIONER

## 2024-04-23 PROCEDURE — 1159F MED LIST DOCD IN RCRD: CPT | Mod: CPTII,,, | Performed by: NURSE PRACTITIONER

## 2024-04-23 PROCEDURE — 3008F BODY MASS INDEX DOCD: CPT | Mod: CPTII,,, | Performed by: NURSE PRACTITIONER

## 2024-04-23 PROCEDURE — 96372 THER/PROPH/DIAG INJ SC/IM: CPT | Mod: ,,, | Performed by: NURSE PRACTITIONER

## 2024-04-23 PROCEDURE — 3044F HG A1C LEVEL LT 7.0%: CPT | Mod: CPTII,,, | Performed by: NURSE PRACTITIONER

## 2024-04-23 PROCEDURE — 99214 OFFICE O/P EST MOD 30 MIN: CPT | Mod: 25,,, | Performed by: NURSE PRACTITIONER

## 2024-04-23 PROCEDURE — 73610 X-RAY EXAM OF ANKLE: CPT | Mod: TC,RT

## 2024-04-23 PROCEDURE — 3061F NEG MICROALBUMINURIA REV: CPT | Mod: CPTII,,, | Performed by: NURSE PRACTITIONER

## 2024-04-23 RX ORDER — KETOROLAC TROMETHAMINE 30 MG/ML
60 INJECTION, SOLUTION INTRAMUSCULAR; INTRAVENOUS
Status: COMPLETED | OUTPATIENT
Start: 2024-04-23 | End: 2024-04-23

## 2024-04-23 RX ORDER — METHYLPREDNISOLONE ACETATE 80 MG/ML
80 INJECTION, SUSPENSION INTRA-ARTICULAR; INTRALESIONAL; INTRAMUSCULAR; SOFT TISSUE
Status: COMPLETED | OUTPATIENT
Start: 2024-04-23 | End: 2024-04-23

## 2024-04-23 RX ORDER — DEXAMETHASONE SODIUM PHOSPHATE 4 MG/ML
4 INJECTION, SOLUTION INTRA-ARTICULAR; INTRALESIONAL; INTRAMUSCULAR; INTRAVENOUS; SOFT TISSUE
Status: COMPLETED | OUTPATIENT
Start: 2024-04-23 | End: 2024-04-23

## 2024-04-23 RX ADMIN — DEXAMETHASONE SODIUM PHOSPHATE 4 MG: 4 INJECTION, SOLUTION INTRA-ARTICULAR; INTRALESIONAL; INTRAMUSCULAR; INTRAVENOUS; SOFT TISSUE at 10:04

## 2024-04-23 RX ADMIN — KETOROLAC TROMETHAMINE 60 MG: 30 INJECTION, SOLUTION INTRAMUSCULAR; INTRAVENOUS at 10:04

## 2024-04-23 RX ADMIN — METHYLPREDNISOLONE ACETATE 80 MG: 80 INJECTION, SUSPENSION INTRA-ARTICULAR; INTRALESIONAL; INTRAMUSCULAR; SOFT TISSUE at 10:04

## 2024-04-23 NOTE — PROGRESS NOTES
[facility]  Primary Care       PATIENT NAME: Papo Bell   : 1970    AGE: 53 y.o. DATE: 2024    MRN: [id number]        Reason for Visit / Chief Complaint:  Foot Pain (Right swollen painful hurts to walk  duration 2-3 weeks.), Diabetes (Pt has eaten blood sugar 120), and Hypertension     Subjective:     HPI: Patient states he has swelling and pain to right ankle x 3 weeks. States he has pain when trying to apply pressure to right foot/ankle. Denies any injury to right foot; denies any falls.     Swelling to bilateral lower extremities; history of chronic venous insufficiency, secondary lymphedema, edema to lower extremities. Patient sees Dr. Canada, vascular specialist. Patient has lymphatic pump at home and states he uses it some. Has compression stockings and states he uses them some.            Review of Systems: Review of Systems   Constitutional:  Negative for fever.   Respiratory:  Negative for cough and shortness of breath.    Cardiovascular:  Positive for leg swelling. Negative for chest pain.   Genitourinary:  Negative for dysuria.   Musculoskeletal:  Positive for arthralgias (right ankle/foot pain).   Skin:  Negative for rash.   Neurological:  Negative for headaches.          Review of patient's allergies indicates:  No Known Allergies     Med List:  Current Outpatient Medications on File Prior to Visit   Medication Sig Dispense Refill    amLODIPine (NORVASC) 10 MG tablet TAKE 1 TABLET BY MOUTH EVERYDAY FOR BLOOD PRESSURE. 7 tablet 0    aspirin (ECOTRIN) 81 MG EC tablet Take 1 tablet (81 mg total) by mouth 2 (two) times daily. 90 tablet 2    atorvastatin (LIPITOR) 80 MG tablet Take 1 tablet (80 mg total) by mouth once daily. 90 tablet 3    diclofenac (VOLTAREN) 75 MG EC tablet Take 75 mg by mouth 2 (two) times daily.      ergocalciferol (ERGOCALCIFEROL) 50,000 unit Cap Take 1 capsule by mouth weekly for 12 weeks then take 1 capsule monthly after that 12 capsule 2     esomeprazole (NEXIUM) 20 MG capsule TAKE 1 CAPSULE BEFORE      BREAKFAST 90 capsule 0    gabapentin (NEURONTIN) 100 MG capsule Take 1 capsule (100 mg total) by mouth 3 (three) times daily. 90 capsule 5    hydroCHLOROthiazide (MICROZIDE) 12.5 mg capsule       HYDROcodone-acetaminophen (NORCO) 5-325 mg per tablet Take 1 tablet by mouth 3 (three) times daily.      mineral oil-hydrophil petrolat (AQUAPHOR) Oint Apply topically as needed (dry skin). 396 g 1    omega 3-dha-epa-fish oil (FISH OIL) 1,000 mg (120 mg-180 mg) Cap Take 1 capsule by mouth once daily. 90 capsule 3    potassium chloride SA (K-DUR,KLOR-CON) 20 MEQ tablet Take 1 tablet (20 mEq total) by mouth 2 (two) times daily. 180 tablet 2    tadalafiL (CIALIS) 5 MG tablet Take one tablet prn before sexual activity; effects may last 36 hours. Max dose is 20 mg/dose up to 1 dose/24 hours. 10 tablet 1    timolol maleate 0.5% (TIMOPTIC) 0.5 % Drop PUT 1 DROP INTO AFFECTED EYE(S) EVERY MORNING       No current facility-administered medications on file prior to visit.       Medical/Social/Family History:  Past Medical History:   Diagnosis Date    BMI 45.0-49.9, adult 06/20/2018    Cervicalgia 04/07/2021    Diabetes mellitus, type 2     DJD (degenerative joint disease) 03/15/2019    Edema 01/27/2021    Essential hypertension 04/07/2021    GERD (gastroesophageal reflux disease) 01/13/2021    Hyperlipidemia     Hypertension     Low back pain 06/19/2019    Osteoporosis     Pain in right shoulder 04/07/2021    Tachycardia 04/07/2021      Social History     Tobacco Use   Smoking Status Never   Smokeless Tobacco Never      Social History     Substance and Sexual Activity   Alcohol Use Yes       Family History   Problem Relation Name Age of Onset    Diabetes Mother          AGE 78    Diabetes Father          AGE 54    Hypertension Father      No Known Problems Brother          AGE 55    No Known Problems Brother          AGE 49    Hypertension Brother          AGE 47    No  "Known Problems Daughter      No Known Problems Daughter      No Known Problems Daughter      No Known Problems Son      No Known Problems Son        Past Surgical History:   Procedure Laterality Date    CARPAL TUNNEL RELEASE Left 2/17/2022    Procedure: RELEASE, CARPAL TUNNEL;  Surgeon: Wilber Crocker MD;  Location: [facility] OR;  Service: Orthopedics;  Laterality: Left;    PATELLA TENDONS REPAIR Bilateral     RADIOFREQUENCY ABLATION Left 2/11/2022    Procedure: Left GSV RF Ablation;  Surgeon: Collins Canada DO;  Location: [facility] OR;  Service: Vascular;  Laterality: Left;    TORN MENISCUS Left     ULNAR NERVE TRANSPOSITION Left 2/17/2022    Procedure: TRANSPOSITION, NERVE, ULNAR;  Surgeon: Wilber Crocker MD;  Location: [facility] OR;  Service: Orthopedics;  Laterality: Left;    WRIST FRACTURE SURGERY Left         There is no immunization history on file for this patient.       Objective:      Vitals:    04/23/24 0954 04/23/24 1021   BP: (!) 145/90 (!) 141/93   BP Location: Left arm Left arm   Patient Position: Sitting Sitting   BP Method: Large (Manual) Large (Manual)   Pulse: 96    Resp: 20    Temp: 97.6 °F (36.4 °C)    TempSrc: Oral    SpO2: 99%    Weight: (!) 143.8 kg (317 lb)    Height: 5' 8" (1.727 m)      Body mass index is 48.2 kg/m².     Physical Exam: Physical Exam  Constitutional:       General: He is not in acute distress.     Appearance: Normal appearance. He is obese. He is not ill-appearing, toxic-appearing or diaphoretic.   HENT:      Head: Normocephalic.      Mouth/Throat:      Mouth: Mucous membranes are moist.   Eyes:      Extraocular Movements: Extraocular movements intact.      Conjunctiva/sclera: Conjunctivae normal.      Pupils: Pupils are equal, round, and reactive to light.   Cardiovascular:      Rate and Rhythm: Normal rate and regular rhythm.      Heart sounds: Normal heart sounds.   Pulmonary:      Effort: Pulmonary effort is normal.      Breath sounds: Normal breath " sounds.   Abdominal:      General: Bowel sounds are normal. There is no distension.      Palpations: Abdomen is soft.      Tenderness: There is no abdominal tenderness.   Musculoskeletal:         General: Tenderness present. Normal range of motion.      Cervical back: Normal range of motion and neck supple.      Right lower leg: Edema present.      Left lower leg: Edema present.      Right ankle: Tenderness present.        Legs:    Skin:     General: Skin is warm and dry.   Neurological:      General: No focal deficit present.      Mental Status: He is alert and oriented to person, place, and time.   Psychiatric:         Mood and Affect: Mood normal.         Behavior: Behavior normal.                Assessment:          ICD-10-CM ICD-9-CM   1. Acute right ankle pain  M25.571 719.47     338.19   2. Edema, lower extremity  R60.0 782.3   3. Secondary lymphedema  I89.0 457.1   4. Hypertriglyceridemia  E78.1 272.1   5. Other hyperlipidemia  E78.49 272.4   6. Hypokalemia  E87.6 276.8   7. Primary hypertension  I10 401.9   8. Type 2 diabetes mellitus without complication, without long-term current use of insulin  E11.9 250.00        Plan:       Acute right ankle pain  -     Uric Acid; Future; Expected date: 04/23/2024  -     X-Ray Ankle Complete 3 View Right; Future; Expected date: 04/23/2024  -     ketorolac injection 60 mg  -     dexAMETHasone injection 4 mg  -     methylPREDNISolone acetate injection 80 mg    Edema, lower extremity  Followed by Dr. Canada    Secondary lymphedema  Followed by Dr. Canada    Hypertriglyceridemia  -     Lipid Panel; Future; Expected date: 04/23/2024    Other hyperlipidemia  - Lipid Panel: Future; Expected date: 04/23/2024  Hypokalemia  -     Comprehensive Metabolic Panel; Future; Expected date: 04/23/2024    Primary hypertension  -     CBC Auto Differential; Future; Expected date: 04/23/2024  -     Comprehensive Metabolic Panel; Future; Expected date: 04/23/2024    Type 2 diabetes mellitus  "without complication, without long-term current use of insulin  -     POCT glucose  -     Hemoglobin A1C; Future; Expected date: 04/23/2024      Follow up in about 6 months (around 10/23/2024), or if symptoms worsen or fail to improve, for Hypertension;     RTC for repeat blood pressure only in 2 weeks. .    New & refilled meds:  Requested Prescriptions      No prescriptions requested or ordered in this encounter            Patient Instructions   Patient Education       Controlling Your Blood Pressure Through Lifestyle   The Basics   Written by the doctors and editors at [facility]   What does my lifestyle have to do with my blood pressure? -- The things you do and the foods you eat have a big effect on your blood pressure and your overall health. Following the right lifestyle can:  Lower your blood pressure or keep you from getting high blood pressure in the first place  Reduce your need for blood pressure medicines  Make medicines for high blood pressure work better, if you do take them  Lower the chances that you'll have a heart attack or stroke, or develop kidney disease  Which lifestyle choices will help lower my blood pressure? -- Here's what you can do:  Lose weight (if you are overweight)  Choose a diet rich in fruits, vegetables, and low-fat dairy products, and low in meats, sweets, and refined grains  Eat less salt (sodium)  Do something active for at least 30 minutes a day on most days of the week  Limit the amount of alcohol you drink  If you have high blood pressure, it's also very important to quit smoking (if you smoke). Quitting smoking might not bring your blood pressure down. But it will lower the chances that you'll have a heart attack or stroke, and it will help you feel better and live longer.  Start low and go slow -- The changes listed above might sound like a lot, but don't worry. You don't have to change everything all at once. The key to improving your lifestyle is to "start low and go slow." " "Choose 1 small, specific thing to change and try doing it for a while. If it works for you, keep doing it until it becomes a habit. If it doesn't, don't give up. Choose something else to change and see how that goes.  Let's say, for example, that you would like to improve your diet. If you're the type of person who eats cheeseburgers and French fries all the time, you can't switch to eating just salads from one day to the next. When people try to make changes like that, they often fail. Then they feel frustrated and tend to give up. So instead of trying to change everything about your diet in 1 day, change 1 or 2 small things about your diet and give yourself time to get used to those changes. For instance, keep the cheeseburger but give up the French fries. Or eat the same things but cut your portions in half.  As you find things that you are able to change and stick with, keep adding new changes. In time, you will see that you can actually change a lot. You just have to get used to the changes slowly.  Lose weight -- When people think about losing weight, they sometimes make it more complicated than it really is. To lose weight, you have to either eat less or move more. If you do both of those things, it's even better. But there is no single weight-loss diet or activity that's better than any other. When it comes to weight loss, the most effective plan is the one that you'll stick with.  Improve your diet -- There is no single diet that is right for everyone. But in general, a healthy diet can include:  Lots of fruits, vegetables, and whole grains  Some beans, peas, lentils, chickpeas, and similar foods  Some nuts, such as walnuts, almonds, and peanuts  Fat-free or low-fat milk and milk products  Some fish  To have a healthy diet, it's also important to limit or avoid sugar, sweets, meats, and refined grains. (Refined grains are found in white bread, white rice, most forms of pasta, and most packaged "snack" " "foods.)  Reduce salt -- Many people think that eating a low-sodium diet means avoiding the salt shaker and not adding salt when cooking. The truth is, not adding salt at the table or when you cook will only help a little. Almost all of the sodium you eat is already in the food you buy at the grocery store or at restaurants (figure 1).  The most important thing you can do to cut down on sodium is to eat less processed food. That means that you should avoid most foods that are sold in cans, boxes, jars, and bags. You should also eat in restaurants less often.  To reduce the amount of sodium you get, buy fresh or fresh-frozen fruits, vegetables, and meats. (Fresh-frozen foods have had nothing added to them before freezing.) Then you can make meals at home, from scratch, with these ingredients.  As with the other changes, don't try to cut out salt all at once. Instead, choose 1 or 2 foods that have a lot of sodium and try to replace them with low-sodium choices. When you get used to those low-sodium options, find another food or 2 to change. Then keep going, until all the foods you eat are sodium-free or low in sodium.  Become more active -- If you want to be more active, you don't have to go to the gym or get all sweaty. It is possible to increase your activity level while doing everyday things you enjoy. Walking, gardening, and dancing are just a few of the things that you might try. As with all the other changes, the key is not to do too much too fast. If you don't do any activity now, start by walking for just a few minutes every other day. Do that for a few weeks. If you stick with it, try doing it for longer. But if you find that you don't like walking, try a different activity.  Drink less alcohol -- If you are a woman, do not have more than 1 "standard drink" of alcohol a day. If you are a man, do not have more than 2. A "standard drink" is:  A can or bottle that has 12 ounces of beer  A glass that has 5 ounces " "of wine  A shot that has 1.5 ounces of whiskey  Where should I start? -- If you want to improve your lifestyle, start by making the changes that you think would be easiest for you. If you used to exercise and just got out of the habit, maybe it would be easy for you to start exercising again. Or if you actually like cooking meals from scratch, maybe the first thing you should focus on is eating home-cooked meals that are low in sodium.  Whatever you tackle first, choose specific, realistic goals, and give yourself a deadline. For example, do not decide that you are going to "exercise more." Instead, decide that you are going to walk for 10 minutes on Monday, Wednesday, and Friday, and that you are going to do this for the next 2 weeks.  When lifestyle changes are too general, people have a hard time following through.  Now go. You can do it!  All topics are updated as new evidence becomes available and our peer review process is complete.  This topic retrieved from [employer] on: Sep 21, 2021.  Topic 51820 Version 8.0  Release: 29.4.2 - C29.263  © 2021 UpToDate, Inc. and/or its affiliates. All rights reserved.  figure 1: Sources of sodium in your diet     Graphic 69425 Version 2.0    Consumer Information Use and Disclaimer   This information is not specific medical advice and does not replace information you receive from your health care provider. This is only a brief summary of general information. It does NOT include all information about conditions, illnesses, injuries, tests, procedures, treatments, therapies, discharge instructions or life-style choices that may apply to you. You must talk with your health care provider for complete information about your health and treatment options. This information should not be used to decide whether or not to accept your health care provider's advice, instructions or recommendations. Only your health care provider has the knowledge and training to provide advice that is " right for you. The use of this information is governed by the [employer] End User License Agreement, available at https://www.Precyse.Vilynx/en/solutions/HeiaHeia.com/about/link.The use of [employer] content is governed by the [employer] Terms of Use. ©2021 UpToDate, Inc. All rights reserved.  Copyright   © 2021 UpToDate, Inc. and/or its affiliates. All rights reserved.           Signature: Huey Mtz DNP, FNP-C

## 2024-04-23 NOTE — PATIENT INSTRUCTIONS
"Patient Education       Controlling Your Blood Pressure Through Lifestyle   The Basics   Written by the doctors and editors at [facility]   What does my lifestyle have to do with my blood pressure? -- The things you do and the foods you eat have a big effect on your blood pressure and your overall health. Following the right lifestyle can:  Lower your blood pressure or keep you from getting high blood pressure in the first place  Reduce your need for blood pressure medicines  Make medicines for high blood pressure work better, if you do take them  Lower the chances that you'll have a heart attack or stroke, or develop kidney disease  Which lifestyle choices will help lower my blood pressure? -- Here's what you can do:  Lose weight (if you are overweight)  Choose a diet rich in fruits, vegetables, and low-fat dairy products, and low in meats, sweets, and refined grains  Eat less salt (sodium)  Do something active for at least 30 minutes a day on most days of the week  Limit the amount of alcohol you drink  If you have high blood pressure, it's also very important to quit smoking (if you smoke). Quitting smoking might not bring your blood pressure down. But it will lower the chances that you'll have a heart attack or stroke, and it will help you feel better and live longer.  Start low and go slow -- The changes listed above might sound like a lot, but don't worry. You don't have to change everything all at once. The key to improving your lifestyle is to "start low and go slow." Choose 1 small, specific thing to change and try doing it for a while. If it works for you, keep doing it until it becomes a habit. If it doesn't, don't give up. Choose something else to change and see how that goes.  Let's say, for example, that you would like to improve your diet. If you're the type of person who eats cheeseburgers and French fries all the time, you can't switch to eating just salads from one day to the next. When people try to " "make changes like that, they often fail. Then they feel frustrated and tend to give up. So instead of trying to change everything about your diet in 1 day, change 1 or 2 small things about your diet and give yourself time to get used to those changes. For instance, keep the cheeseburger but give up the French fries. Or eat the same things but cut your portions in half.  As you find things that you are able to change and stick with, keep adding new changes. In time, you will see that you can actually change a lot. You just have to get used to the changes slowly.  Lose weight -- When people think about losing weight, they sometimes make it more complicated than it really is. To lose weight, you have to either eat less or move more. If you do both of those things, it's even better. But there is no single weight-loss diet or activity that's better than any other. When it comes to weight loss, the most effective plan is the one that you'll stick with.  Improve your diet -- There is no single diet that is right for everyone. But in general, a healthy diet can include:  Lots of fruits, vegetables, and whole grains  Some beans, peas, lentils, chickpeas, and similar foods  Some nuts, such as walnuts, almonds, and peanuts  Fat-free or low-fat milk and milk products  Some fish  To have a healthy diet, it's also important to limit or avoid sugar, sweets, meats, and refined grains. (Refined grains are found in white bread, white rice, most forms of pasta, and most packaged "snack" foods.)  Reduce salt -- Many people think that eating a low-sodium diet means avoiding the salt shaker and not adding salt when cooking. The truth is, not adding salt at the table or when you cook will only help a little. Almost all of the sodium you eat is already in the food you buy at the grocery store or at restaurants (figure 1).  The most important thing you can do to cut down on sodium is to eat less processed food. That means that you should " "avoid most foods that are sold in cans, boxes, jars, and bags. You should also eat in restaurants less often.  To reduce the amount of sodium you get, buy fresh or fresh-frozen fruits, vegetables, and meats. (Fresh-frozen foods have had nothing added to them before freezing.) Then you can make meals at home, from scratch, with these ingredients.  As with the other changes, don't try to cut out salt all at once. Instead, choose 1 or 2 foods that have a lot of sodium and try to replace them with low-sodium choices. When you get used to those low-sodium options, find another food or 2 to change. Then keep going, until all the foods you eat are sodium-free or low in sodium.  Become more active -- If you want to be more active, you don't have to go to the gym or get all sweaty. It is possible to increase your activity level while doing everyday things you enjoy. Walking, gardening, and dancing are just a few of the things that you might try. As with all the other changes, the key is not to do too much too fast. If you don't do any activity now, start by walking for just a few minutes every other day. Do that for a few weeks. If you stick with it, try doing it for longer. But if you find that you don't like walking, try a different activity.  Drink less alcohol -- If you are a woman, do not have more than 1 "standard drink" of alcohol a day. If you are a man, do not have more than 2. A "standard drink" is:  A can or bottle that has 12 ounces of beer  A glass that has 5 ounces of wine  A shot that has 1.5 ounces of whiskey  Where should I start? -- If you want to improve your lifestyle, start by making the changes that you think would be easiest for you. If you used to exercise and just got out of the habit, maybe it would be easy for you to start exercising again. Or if you actually like cooking meals from scratch, maybe the first thing you should focus on is eating home-cooked meals that are low in sodium.  Whatever you " "tackle first, choose specific, realistic goals, and give yourself a deadline. For example, do not decide that you are going to "exercise more." Instead, decide that you are going to walk for 10 minutes on Monday, Wednesday, and Friday, and that you are going to do this for the next 2 weeks.  When lifestyle changes are too general, people have a hard time following through.  Now go. You can do it!  All topics are updated as new evidence becomes available and our peer review process is complete.  This topic retrieved from [employer] on: Sep 21, 2021.  Topic 18061 Version 8.0  Release: 29.4.2 - C29.263  © 2021 UpToDate, Inc. and/or its affiliates. All rights reserved.  figure 1: Sources of sodium in your diet     Graphic 44683 Version 2.0    Consumer Information Use and Disclaimer   This information is not specific medical advice and does not replace information you receive from your health care provider. This is only a brief summary of general information. It does NOT include all information about conditions, illnesses, injuries, tests, procedures, treatments, therapies, discharge instructions or life-style choices that may apply to you. You must talk with your health care provider for complete information about your health and treatment options. This information should not be used to decide whether or not to accept your health care provider's advice, instructions or recommendations. Only your health care provider has the knowledge and training to provide advice that is right for you. The use of this information is governed by the [employer] End User License Agreement, available at https://www.MicroCoal.Halalati/en/solutions/[employer]/about/link.The use of [employer] content is governed by the [employer] Terms of Use. ©2021 UpToDate, Inc. All rights reserved.  Copyright   © 2021 UpToDate, Inc. and/or its affiliates. All rights reserved.    "

## 2024-04-24 DIAGNOSIS — R71.8 ELEVATED MCV: Primary | ICD-10-CM

## 2024-04-25 ENCOUNTER — TELEPHONE (OUTPATIENT)
Dept: PRIMARY CARE CLINIC | Facility: CLINIC | Age: 54
End: 2024-04-25
Payer: COMMERCIAL

## 2024-04-25 DIAGNOSIS — E87.6 HYPOKALEMIA: ICD-10-CM

## 2024-04-25 RX ORDER — POTASSIUM CHLORIDE 20 MEQ/1
20 TABLET, EXTENDED RELEASE ORAL 2 TIMES DAILY
Qty: 180 TABLET | Refills: 2 | Status: SHIPPED | OUTPATIENT
Start: 2024-04-25 | End: 2025-01-20

## 2024-04-25 NOTE — TELEPHONE ENCOUNTER
----- Message from Huey Mtz DNP, KIRK-C sent at 4/24/2024 11:33 AM CDT -----  Please notify of results

## 2024-04-25 NOTE — TELEPHONE ENCOUNTER
----- Message from Huey Mtz DNP, FNP-C sent at 4/24/2024 11:41 AM CDT -----  Please notify of results. Potassium is low at 3.3. He has potassium 20 meq po twice a day. Ask patient if he is being consistent and taking the med as prescribed.     Triglycerides are much improved. Continue current plan of care.     Add a b12 level due to MCV elevated on CBC

## 2024-05-14 ENCOUNTER — OFFICE VISIT (OUTPATIENT)
Dept: PRIMARY CARE CLINIC | Facility: CLINIC | Age: 54
End: 2024-05-14
Payer: COMMERCIAL

## 2024-05-14 VITALS
HEART RATE: 112 BPM | BODY MASS INDEX: 45.47 KG/M2 | DIASTOLIC BLOOD PRESSURE: 90 MMHG | RESPIRATION RATE: 20 BRPM | OXYGEN SATURATION: 96 % | HEIGHT: 68 IN | SYSTOLIC BLOOD PRESSURE: 143 MMHG | WEIGHT: 300 LBS | TEMPERATURE: 98 F

## 2024-05-14 DIAGNOSIS — J40 BRONCHITIS: Primary | ICD-10-CM

## 2024-05-14 DIAGNOSIS — R00.0 TACHYCARDIA: ICD-10-CM

## 2024-05-14 DIAGNOSIS — K92.1 BLOOD IN STOOL: ICD-10-CM

## 2024-05-14 DIAGNOSIS — R53.1 GENERALIZED WEAKNESS: ICD-10-CM

## 2024-05-14 DIAGNOSIS — R06.2 WHEEZING: ICD-10-CM

## 2024-05-14 DIAGNOSIS — R05.1 ACUTE COUGH: ICD-10-CM

## 2024-05-14 DIAGNOSIS — I10 PRIMARY HYPERTENSION: ICD-10-CM

## 2024-05-14 DIAGNOSIS — R11.2 NAUSEA AND VOMITING, UNSPECIFIED VOMITING TYPE: ICD-10-CM

## 2024-05-14 DIAGNOSIS — R94.31 ABNORMAL EKG: ICD-10-CM

## 2024-05-14 DIAGNOSIS — R06.02 SHORTNESS OF BREATH: ICD-10-CM

## 2024-05-14 DIAGNOSIS — R53.83 FATIGUE, UNSPECIFIED TYPE: ICD-10-CM

## 2024-05-14 DIAGNOSIS — R71.8 ELEVATED MCV: ICD-10-CM

## 2024-05-14 LAB
ALBUMIN SERPL BCP-MCNC: 3.4 G/DL (ref 3.5–5)
ALBUMIN/GLOB SERPL: 0.8 {RATIO}
ALP SERPL-CCNC: 86 U/L (ref 45–115)
ALT SERPL W P-5'-P-CCNC: 63 U/L (ref 16–61)
ANION GAP SERPL CALCULATED.3IONS-SCNC: 17 MMOL/L (ref 7–16)
AST SERPL W P-5'-P-CCNC: 111 U/L (ref 15–37)
BASOPHILS # BLD AUTO: 0.05 K/UL (ref 0–0.2)
BASOPHILS NFR BLD AUTO: 0.8 % (ref 0–1)
BILIRUB SERPL-MCNC: 1.5 MG/DL (ref ?–1.2)
BUN SERPL-MCNC: 11 MG/DL (ref 7–18)
BUN/CREAT SERPL: 8 (ref 6–20)
CALCIUM SERPL-MCNC: 8.4 MG/DL (ref 8.5–10.1)
CHLORIDE SERPL-SCNC: 102 MMOL/L (ref 98–107)
CO2 SERPL-SCNC: 23 MMOL/L (ref 21–32)
CREAT SERPL-MCNC: 1.38 MG/DL (ref 0.7–1.3)
DIFFERENTIAL METHOD BLD: ABNORMAL
EGFR (NO RACE VARIABLE) (RUSH/TITUS): 60 ML/MIN/1.73M2
EKG 12-LEAD: NORMAL
EOSINOPHIL # BLD AUTO: 0.02 K/UL (ref 0–0.5)
EOSINOPHIL NFR BLD AUTO: 0.3 % (ref 1–4)
ERYTHROCYTE [DISTWIDTH] IN BLOOD BY AUTOMATED COUNT: 14.5 % (ref 11.5–14.5)
GLOBULIN SER-MCNC: 4.5 G/DL (ref 2–4)
GLUCOSE SERPL-MCNC: 130 MG/DL (ref 74–106)
HCT VFR BLD AUTO: 39.4 % (ref 40–54)
HGB BLD-MCNC: 13.3 G/DL (ref 13.5–18)
IMM GRANULOCYTES # BLD AUTO: 0.04 K/UL (ref 0–0.04)
IMM GRANULOCYTES NFR BLD: 0.6 % (ref 0–0.4)
LYMPHOCYTES # BLD AUTO: 2.29 K/UL (ref 1–4.8)
LYMPHOCYTES NFR BLD AUTO: 35.6 % (ref 27–41)
MCH RBC QN AUTO: 35.3 PG (ref 27–31)
MCHC RBC AUTO-ENTMCNC: 33.8 G/DL (ref 32–36)
MCV RBC AUTO: 104.5 FL (ref 80–96)
MONOCYTES # BLD AUTO: 0.5 K/UL (ref 0–0.8)
MONOCYTES NFR BLD AUTO: 7.8 % (ref 2–6)
MPC BLD CALC-MCNC: 11.4 FL (ref 9.4–12.4)
NEUTROPHILS # BLD AUTO: 3.54 K/UL (ref 1.8–7.7)
NEUTROPHILS NFR BLD AUTO: 54.9 % (ref 53–65)
NRBC # BLD AUTO: 0.02 X10E3/UL
NRBC, AUTO (.00): 0.3 %
PLATELET # BLD AUTO: 112 K/UL (ref 150–400)
POTASSIUM SERPL-SCNC: 3.2 MMOL/L (ref 3.5–5.1)
PR INTERVAL: NORMAL
PROT SERPL-MCNC: 7.9 G/DL (ref 6.4–8.2)
PRT AXES: NORMAL
QRS DURATION: NORMAL
QT/QTC: NORMAL
RBC # BLD AUTO: 3.77 M/UL (ref 4.6–6.2)
SODIUM SERPL-SCNC: 139 MMOL/L (ref 136–145)
VENTRICULAR RATE: NORMAL
WBC # BLD AUTO: 6.44 K/UL (ref 4.5–11)

## 2024-05-14 PROCEDURE — 93000 ELECTROCARDIOGRAM COMPLETE: CPT | Mod: ,,, | Performed by: NURSE PRACTITIONER

## 2024-05-14 PROCEDURE — 96372 THER/PROPH/DIAG INJ SC/IM: CPT | Mod: ,,, | Performed by: NURSE PRACTITIONER

## 2024-05-14 PROCEDURE — 85025 COMPLETE CBC W/AUTO DIFF WBC: CPT | Mod: ,,, | Performed by: CLINICAL MEDICAL LABORATORY

## 2024-05-14 PROCEDURE — 3044F HG A1C LEVEL LT 7.0%: CPT | Mod: CPTII,,, | Performed by: NURSE PRACTITIONER

## 2024-05-14 PROCEDURE — 3066F NEPHROPATHY DOC TX: CPT | Mod: CPTII,,, | Performed by: NURSE PRACTITIONER

## 2024-05-14 PROCEDURE — 3080F DIAST BP >= 90 MM HG: CPT | Mod: CPTII,,, | Performed by: NURSE PRACTITIONER

## 2024-05-14 PROCEDURE — 1160F RVW MEDS BY RX/DR IN RCRD: CPT | Mod: CPTII,,, | Performed by: NURSE PRACTITIONER

## 2024-05-14 PROCEDURE — 82746 ASSAY OF FOLIC ACID SERUM: CPT | Mod: ,,, | Performed by: CLINICAL MEDICAL LABORATORY

## 2024-05-14 PROCEDURE — 3008F BODY MASS INDEX DOCD: CPT | Mod: CPTII,,, | Performed by: NURSE PRACTITIONER

## 2024-05-14 PROCEDURE — 82607 VITAMIN B-12: CPT | Mod: ,,, | Performed by: CLINICAL MEDICAL LABORATORY

## 2024-05-14 PROCEDURE — 3061F NEG MICROALBUMINURIA REV: CPT | Mod: CPTII,,, | Performed by: NURSE PRACTITIONER

## 2024-05-14 PROCEDURE — 80053 COMPREHEN METABOLIC PANEL: CPT | Mod: ,,, | Performed by: CLINICAL MEDICAL LABORATORY

## 2024-05-14 PROCEDURE — 3077F SYST BP >= 140 MM HG: CPT | Mod: CPTII,,, | Performed by: NURSE PRACTITIONER

## 2024-05-14 PROCEDURE — 1159F MED LIST DOCD IN RCRD: CPT | Mod: CPTII,,, | Performed by: NURSE PRACTITIONER

## 2024-05-14 PROCEDURE — 99213 OFFICE O/P EST LOW 20 MIN: CPT | Mod: 25,,, | Performed by: NURSE PRACTITIONER

## 2024-05-14 RX ORDER — AMOXICILLIN AND CLAVULANATE POTASSIUM 500; 125 MG/1; MG/1
1 TABLET, FILM COATED ORAL EVERY 12 HOURS
Qty: 20 TABLET | Refills: 0 | Status: SHIPPED | OUTPATIENT
Start: 2024-05-14 | End: 2024-06-11

## 2024-05-14 RX ORDER — METHYLPREDNISOLONE 4 MG/1
TABLET ORAL
Qty: 21 EACH | Refills: 0 | Status: SHIPPED | OUTPATIENT
Start: 2024-05-14 | End: 2024-06-04

## 2024-05-14 RX ORDER — CEFTRIAXONE 1 G/1
1 INJECTION, POWDER, FOR SOLUTION INTRAMUSCULAR; INTRAVENOUS
Status: COMPLETED | OUTPATIENT
Start: 2024-05-14 | End: 2024-05-14

## 2024-05-14 RX ORDER — OMEGA-3-ACID ETHYL ESTERS 1 G/1
1 CAPSULE, LIQUID FILLED ORAL
COMMUNITY
Start: 2024-01-04

## 2024-05-14 RX ORDER — ALBUTEROL SULFATE 90 UG/1
2 AEROSOL, METERED RESPIRATORY (INHALATION) EVERY 6 HOURS PRN
Qty: 18 G | Refills: 0 | Status: SHIPPED | OUTPATIENT
Start: 2024-05-14 | End: 2024-06-11

## 2024-05-14 RX ORDER — BETAMETHASONE SODIUM PHOSPHATE AND BETAMETHASONE ACETATE 3; 3 MG/ML; MG/ML
6 INJECTION, SUSPENSION INTRA-ARTICULAR; INTRALESIONAL; INTRAMUSCULAR; SOFT TISSUE ONCE
Status: COMPLETED | OUTPATIENT
Start: 2024-05-14 | End: 2024-05-14

## 2024-05-14 RX ORDER — GUAIFENESIN 1200 MG/1
1 TABLET, EXTENDED RELEASE ORAL EVERY 12 HOURS PRN
Qty: 60 TABLET | Refills: 1 | Status: SHIPPED | OUTPATIENT
Start: 2024-05-14 | End: 2024-06-11

## 2024-05-14 RX ADMIN — BETAMETHASONE SODIUM PHOSPHATE AND BETAMETHASONE ACETATE 6 MG: 3; 3 INJECTION, SUSPENSION INTRA-ARTICULAR; INTRALESIONAL; INTRAMUSCULAR; SOFT TISSUE at 11:05

## 2024-05-14 RX ADMIN — CEFTRIAXONE 1 G: 1 INJECTION, POWDER, FOR SOLUTION INTRAMUSCULAR; INTRAVENOUS at 11:05

## 2024-05-14 NOTE — PROGRESS NOTES
[facility]  [address] [place] [address]  Ph: [phone] Huey Mtz DNP, FNP-C  [facility]  Primary Care       PATIENT NAME: Papo Bell   : 1970    AGE: 53 y.o. DATE: 2024    MRN: [id number]        Reason for Visit / Chief Complaint:  Diabetes (Blood sugar 154 fasting.), Hypertension, Cough (Chest congestion , nasal congestion), and othr (Pt has seen bright red blood in stools.)     Subjective:     HPI: Patient states he has coughing with chest congestion. Symptoms started 2 days ago. No fever. Denies any chest pain. Wife states patient has had some shortness of breath. Has had some wheezing. Denies any headache; has had some nausea with vomiting that started 2 days ago; has nausea with vomiting today. Denies any diarrhea. Denies any body aches or chills.     Blood in stool that started last week. States he saw blood in stool 2 days ago. States it was bright red. Denies any pain with bowel movements. States there is not blood in stool all of the time but he has noticed it on another occasion.            Review of Systems: Review of Systems   Constitutional:  Negative for fever.   HENT:  Positive for congestion and rhinorrhea. Negative for sore throat.    Respiratory:  Positive for cough, shortness of breath and wheezing.    Cardiovascular:  Negative for chest pain.   Gastrointestinal:  Positive for blood in stool, nausea and vomiting. Negative for abdominal pain, constipation and diarrhea.   Genitourinary:  Negative for dysuria.   Musculoskeletal:  Negative for gait problem.   Skin:  Negative for rash.   Neurological:  Negative for headaches.          Review of patient's allergies indicates:  No Known Allergies     Med List:  Current Outpatient Medications on File Prior to Visit   Medication Sig Dispense Refill    omega-3 acid ethyl esters (LOVAZA) 1 gram capsule Take 1 capsule by mouth.      amLODIPine (NORVASC) 10 MG tablet TAKE 1 TABLET BY  MOUTH EVERYDAY FOR BLOOD PRESSURE. 7 tablet 0    aspirin (ECOTRIN) 81 MG EC tablet Take 1 tablet (81 mg total) by mouth 2 (two) times daily. 90 tablet 2    atorvastatin (LIPITOR) 80 MG tablet Take 1 tablet (80 mg total) by mouth once daily. 90 tablet 3    diclofenac (VOLTAREN) 75 MG EC tablet Take 75 mg by mouth 2 (two) times daily.      ergocalciferol (ERGOCALCIFEROL) 50,000 unit Cap Take 1 capsule by mouth weekly for 12 weeks then take 1 capsule monthly after that 12 capsule 2    esomeprazole (NEXIUM) 20 MG capsule TAKE 1 CAPSULE BEFORE      BREAKFAST 90 capsule 0    gabapentin (NEURONTIN) 100 MG capsule Take 1 capsule (100 mg total) by mouth 3 (three) times daily. 90 capsule 5    hydroCHLOROthiazide (MICROZIDE) 12.5 mg capsule       HYDROcodone-acetaminophen (NORCO) 5-325 mg per tablet Take 1 tablet by mouth 3 (three) times daily.      mineral oil-hydrophil petrolat (AQUAPHOR) Oint Apply topically as needed (dry skin). 396 g 1    omega 3-dha-epa-fish oil (FISH OIL) 1,000 mg (120 mg-180 mg) Cap Take 1 capsule by mouth once daily. 90 capsule 3    potassium chloride SA (K-DUR,KLOR-CON) 20 MEQ tablet Take 1 tablet (20 mEq total) by mouth 2 (two) times daily. 180 tablet 2    tadalafiL (CIALIS) 5 MG tablet Take one tablet prn before sexual activity; effects may last 36 hours. Max dose is 20 mg/dose up to 1 dose/24 hours. 10 tablet 1    timolol maleate 0.5% (TIMOPTIC) 0.5 % Drop PUT 1 DROP INTO AFFECTED EYE(S) EVERY MORNING       No current facility-administered medications on file prior to visit.       Medical/Social/Family History:  Past Medical History:   Diagnosis Date    BMI 45.0-49.9, adult 06/20/2018    Cervicalgia 04/07/2021    Diabetes mellitus, type 2     DJD (degenerative joint disease) 03/15/2019    Edema 01/27/2021    Essential hypertension 04/07/2021    GERD (gastroesophageal reflux disease) 01/13/2021    Hyperlipidemia     Hypertension     Low back pain 06/19/2019    Osteoporosis     Pain in right  "shoulder 04/07/2021    Tachycardia 04/07/2021      Social History     Tobacco Use   Smoking Status Never   Smokeless Tobacco Never      Social History     Substance and Sexual Activity   Alcohol Use Yes       Family History   Problem Relation Name Age of Onset    Diabetes Mother          AGE 78    Diabetes Father          AGE 54    Hypertension Father      No Known Problems Brother          AGE 55    No Known Problems Brother          AGE 49    Hypertension Brother          AGE 47    No Known Problems Daughter      No Known Problems Daughter      No Known Problems Daughter      No Known Problems Son      No Known Problems Son        Past Surgical History:   Procedure Laterality Date    CARPAL TUNNEL RELEASE Left 2/17/2022    Procedure: RELEASE, CARPAL TUNNEL;  Surgeon: Wilber Crocker MD;  Location: [facility] OR;  Service: Orthopedics;  Laterality: Left;    PATELLA TENDONS REPAIR Bilateral     RADIOFREQUENCY ABLATION Left 2/11/2022    Procedure: Left GSV RF Ablation;  Surgeon: Collins Canada DO;  Location: [facility] OR;  Service: Vascular;  Laterality: Left;    TORN MENISCUS Left     ULNAR NERVE TRANSPOSITION Left 2/17/2022    Procedure: TRANSPOSITION, NERVE, ULNAR;  Surgeon: Wilber Crocker MD;  Location: [facility] OR;  Service: Orthopedics;  Laterality: Left;    WRIST FRACTURE SURGERY Left         There is no immunization history on file for this patient.       Objective:      Vitals:    05/14/24 1008 05/14/24 1022   BP: (!) 151/96 (!) 143/90   BP Location: Left arm Left arm   Patient Position: Sitting Sitting   BP Method: Large (Manual) Large (Manual)   Pulse: (!) 121 (!) 112   Resp: 20    Temp: 97.9 °F (36.6 °C)    TempSrc: Oral    SpO2: 96%    Weight: 136.1 kg (300 lb)    Height: 5' 8" (1.727 m)      Body mass index is 45.61 kg/m².     Physical Exam: Physical Exam  Constitutional:       General: He is not in acute distress.     Appearance: Normal appearance. He is ill-appearing. He is not " toxic-appearing or diaphoretic.   HENT:      Head: Normocephalic.      Ears:      Comments: Excessive cerumen to bilateral canals       Mouth/Throat:      Mouth: Mucous membranes are moist.   Eyes:      Extraocular Movements: Extraocular movements intact.      Conjunctiva/sclera: Conjunctivae normal.      Pupils: Pupils are equal, round, and reactive to light.   Cardiovascular:      Rate and Rhythm: Regular rhythm. Tachycardia present.   Pulmonary:      Effort: Pulmonary effort is normal. No respiratory distress.      Breath sounds: Wheezing (very low wheezing ausculatated) and rhonchi present. No rales.   Musculoskeletal:         General: Normal range of motion.      Cervical back: Normal range of motion and neck supple.   Skin:     General: Skin is warm and dry.   Neurological:      Mental Status: He is alert and oriented to person, place, and time.      Gait: Gait normal.   Psychiatric:         Mood and Affect: Mood normal.         Behavior: Behavior normal.                Assessment:          ICD-10-CM ICD-9-CM   1. Bronchitis  J40 490   2. Shortness of breath  R06.02 786.05   3. Acute cough  R05.1 786.2   4. Generalized weakness  R53.1 780.79   5. Primary hypertension  I10 401.9   6. Tachycardia  R00.0 785.0   7. Blood in stool  K92.1 578.1   8. Wheezing  R06.2 786.07   9. Abnormal EKG  R94.31 794.31   10. Nausea and vomiting, unspecified vomiting type  R11.2 787.01        Plan:       Bronchitis  -     cefTRIAXone injection 1 g  -     betamethasone acetate-betamethasone sodium phosphate injection 6 mg  -     methylPREDNISolone (MEDROL DOSEPACK) 4 mg tablet; use as directed  Dispense: 21 each; Refill: 0  -     amoxicillin-clavulanate 500-125mg (AUGMENTIN) 500-125 mg Tab; Take 1 tablet (500 mg total) by mouth every 12 (twelve) hours. for 10 days  Dispense: 20 tablet; Refill: 0    Shortness of breath  -     X-Ray Chest PA And Lateral; Future; Expected date: 05/14/2024  -     POCT EKG 12-LEAD (Manually Resulted by  Ordering Provider)  -     albuterol (PROVENTIL HFA) 90 mcg/actuation inhaler; Inhale 2 puffs into the lungs every 6 (six) hours as needed for Wheezing. Rescue  Dispense: 18 g; Refill: 0    Acute cough  -     X-Ray Chest PA And Lateral; Future; Expected date: 05/14/2024  -     guaiFENesin 1,200 mg Ta12; Take 1 tablet by mouth every 12 (twelve) hours as needed (cough and congestion).  Dispense: 60 tablet; Refill: 1    Generalized weakness  -     POCT glucose    Primary hypertension  -     POCT EKG 12-LEAD (Manually Resulted by Ordering Provider)  -     CBC Auto Differential; Future; Expected date: 05/14/2024  -     Comprehensive Metabolic Panel; Future; Expected date: 05/14/2024    Tachycardia  -     POCT EKG 12-LEAD (Manually Resulted by Ordering Provider)    Blood in stool  -     Ambulatory referral/consult to Gastroenterology; Future; Expected date: 05/21/2024    Wheezing  -     betamethasone acetate-betamethasone sodium phosphate injection 6 mg  -     albuterol (PROVENTIL HFA) 90 mcg/actuation inhaler; Inhale 2 puffs into the lungs every 6 (six) hours as needed for Wheezing. Rescue  Dispense: 18 g; Refill: 0    Abnormal EKG  -     Ambulatory referral/consult to Cardiology; Future; Expected date: 05/21/2024    Nausea and vomiting, unspecified vomiting type  -     CBC Auto Differential; Future; Expected date: 05/14/2024  -     Comprehensive Metabolic Panel; Future; Expected date: 05/14/2024      Follow up in about 1 week (around 5/21/2024), or if symptoms worsen or fail to improve, for bronchitis, shortness of breath, Hypertension.    New & refilled meds:  Requested Prescriptions     Signed Prescriptions Disp Refills    methylPREDNISolone (MEDROL DOSEPACK) 4 mg tablet 21 each 0     Sig: use as directed    amoxicillin-clavulanate 500-125mg (AUGMENTIN) 500-125 mg Tab 20 tablet 0     Sig: Take 1 tablet (500 mg total) by mouth every 12 (twelve) hours. for 10 days    albuterol (PROVENTIL HFA) 90 mcg/actuation inhaler 18  g 0     Sig: Inhale 2 puffs into the lungs every 6 (six) hours as needed for Wheezing. Rescue    guaiFENesin 1,200 mg Ta12 60 tablet 1     Sig: Take 1 tablet by mouth every 12 (twelve) hours as needed (cough and congestion).            There are no Patient Instructions on file for this visit.         Signature: Huey Mtz DNP, FNP-C

## 2024-05-15 ENCOUNTER — TELEPHONE (OUTPATIENT)
Dept: PRIMARY CARE CLINIC | Facility: CLINIC | Age: 54
End: 2024-05-15
Payer: COMMERCIAL

## 2024-05-15 DIAGNOSIS — R53.83 FATIGUE, UNSPECIFIED TYPE: Primary | ICD-10-CM

## 2024-05-15 DIAGNOSIS — R71.8 ELEVATED MCV: ICD-10-CM

## 2024-05-15 NOTE — TELEPHONE ENCOUNTER
----- Message from Huey Mtz DNP, FNP-C sent at 5/15/2024  1:25 PM CDT -----  Please notify of results.     Potassium is still low. He needs to take an extra potassium today pill today. Add in some electrolytes.     Added a b12 and folate level to his labs drawn on yesterday.     Repeat potassium in one week

## 2024-05-16 LAB
FOLATE SERPL-MCNC: 6.2 NG/ML (ref 3.1–17.5)
VIT B12 SERPL-MCNC: 505 PG/ML (ref 193–986)

## 2024-05-17 ENCOUNTER — TELEPHONE (OUTPATIENT)
Dept: PRIMARY CARE CLINIC | Facility: CLINIC | Age: 54
End: 2024-05-17
Payer: COMMERCIAL

## 2024-05-17 NOTE — TELEPHONE ENCOUNTER
----- Message from Huey Mtz DNP, KIRK-C sent at 5/14/2024  1:19 PM CDT -----  Please notify of results

## 2024-05-21 ENCOUNTER — TELEPHONE (OUTPATIENT)
Dept: PRIMARY CARE CLINIC | Facility: CLINIC | Age: 54
End: 2024-05-21
Payer: COMMERCIAL

## 2024-05-21 ENCOUNTER — OFFICE VISIT (OUTPATIENT)
Dept: PRIMARY CARE CLINIC | Facility: CLINIC | Age: 54
End: 2024-05-21
Payer: COMMERCIAL

## 2024-05-21 VITALS
HEART RATE: 85 BPM | RESPIRATION RATE: 18 BRPM | SYSTOLIC BLOOD PRESSURE: 103 MMHG | OXYGEN SATURATION: 96 % | BODY MASS INDEX: 47.74 KG/M2 | TEMPERATURE: 97 F | DIASTOLIC BLOOD PRESSURE: 52 MMHG | HEIGHT: 68 IN | WEIGHT: 315 LBS

## 2024-05-21 DIAGNOSIS — I95.9 HYPOTENSION, UNSPECIFIED HYPOTENSION TYPE: ICD-10-CM

## 2024-05-21 DIAGNOSIS — E87.6 HYPOKALEMIA: Primary | ICD-10-CM

## 2024-05-21 DIAGNOSIS — I10 PRIMARY HYPERTENSION: ICD-10-CM

## 2024-05-21 DIAGNOSIS — I49.9 CARDIAC ARRHYTHMIA, UNSPECIFIED CARDIAC ARRHYTHMIA TYPE: ICD-10-CM

## 2024-05-21 LAB
ANION GAP SERPL CALCULATED.3IONS-SCNC: 10 MMOL/L (ref 7–16)
BUN SERPL-MCNC: 27 MG/DL (ref 7–18)
BUN/CREAT SERPL: 25 (ref 6–20)
CALCIUM SERPL-MCNC: 8.7 MG/DL (ref 8.5–10.1)
CHLORIDE SERPL-SCNC: 107 MMOL/L (ref 98–107)
CO2 SERPL-SCNC: 26 MMOL/L (ref 21–32)
CREAT SERPL-MCNC: 1.08 MG/DL (ref 0.7–1.3)
EGFR (NO RACE VARIABLE) (RUSH/TITUS): 81 ML/MIN/1.73M2
EKG 12-LEAD: NORMAL
GLUCOSE SERPL-MCNC: 127 MG/DL (ref 74–106)
POTASSIUM SERPL-SCNC: 3.7 MMOL/L (ref 3.5–5.1)
PR INTERVAL: NORMAL
PRT AXES: NORMAL
QRS DURATION: NORMAL
QT/QTC: NORMAL
SODIUM SERPL-SCNC: 139 MMOL/L (ref 136–145)
VENTRICULAR RATE: NORMAL

## 2024-05-21 PROCEDURE — 3078F DIAST BP <80 MM HG: CPT | Mod: CPTII,,, | Performed by: NURSE PRACTITIONER

## 2024-05-21 PROCEDURE — 3066F NEPHROPATHY DOC TX: CPT | Mod: CPTII,,, | Performed by: NURSE PRACTITIONER

## 2024-05-21 PROCEDURE — 1159F MED LIST DOCD IN RCRD: CPT | Mod: CPTII,,, | Performed by: NURSE PRACTITIONER

## 2024-05-21 PROCEDURE — 1160F RVW MEDS BY RX/DR IN RCRD: CPT | Mod: CPTII,,, | Performed by: NURSE PRACTITIONER

## 2024-05-21 PROCEDURE — 3044F HG A1C LEVEL LT 7.0%: CPT | Mod: CPTII,,, | Performed by: NURSE PRACTITIONER

## 2024-05-21 PROCEDURE — 80048 BASIC METABOLIC PNL TOTAL CA: CPT | Mod: ,,, | Performed by: CLINICAL MEDICAL LABORATORY

## 2024-05-21 PROCEDURE — 3061F NEG MICROALBUMINURIA REV: CPT | Mod: CPTII,,, | Performed by: NURSE PRACTITIONER

## 2024-05-21 PROCEDURE — 3074F SYST BP LT 130 MM HG: CPT | Mod: CPTII,,, | Performed by: NURSE PRACTITIONER

## 2024-05-21 PROCEDURE — 93000 ELECTROCARDIOGRAM COMPLETE: CPT | Mod: ,,, | Performed by: NURSE PRACTITIONER

## 2024-05-21 PROCEDURE — 99213 OFFICE O/P EST LOW 20 MIN: CPT | Mod: 25,,, | Performed by: NURSE PRACTITIONER

## 2024-05-21 PROCEDURE — 3008F BODY MASS INDEX DOCD: CPT | Mod: CPTII,,, | Performed by: NURSE PRACTITIONER

## 2024-05-21 NOTE — PROGRESS NOTES
"[facility]  [address] [facility]  [place] [address]  Ph: [phone] Huey Mtz DNP, FNP-C  [facility]  Primary Care       PATIENT NAME: Papo Bell   : 1970    AGE: 53 y.o. DATE: 2024    MRN: [id number]        Reason for Visit / Chief Complaint:  URI (Recheck Bronchitis )     Subjective:     HPI: Patient here for follow up bronchitis and to have potassium rechecked. Patient states he feels better. States he does not have any coughing.     Premature beats noted during cardiac assessment today. Patient states he does not drink caffeine much. Patient states he drinks "enough' of alcohol. Wife states he drinks whiskey daily.       URI   Pertinent negatives include no chest pain, coughing, dysuria, headaches or rash.          Review of Systems: Review of Systems   Constitutional:  Negative for fever.   Respiratory:  Negative for cough and shortness of breath.    Cardiovascular:  Negative for chest pain.   Genitourinary:  Negative for dysuria.   Musculoskeletal:  Negative for gait problem.   Skin:  Negative for rash.   Neurological:  Negative for headaches.          Review of patient's allergies indicates:  No Known Allergies     Med List:  Current Outpatient Medications on File Prior to Visit   Medication Sig Dispense Refill    albuterol (PROVENTIL HFA) 90 mcg/actuation inhaler Inhale 2 puffs into the lungs every 6 (six) hours as needed for Wheezing. Rescue 18 g 0    amLODIPine (NORVASC) 10 MG tablet TAKE 1 TABLET BY MOUTH EVERYDAY FOR BLOOD PRESSURE. 7 tablet 0    amoxicillin-clavulanate 500-125mg (AUGMENTIN) 500-125 mg Tab Take 1 tablet (500 mg total) by mouth every 12 (twelve) hours. for 10 days 20 tablet 0    aspirin (ECOTRIN) 81 MG EC tablet Take 1 tablet (81 mg total) by mouth 2 (two) times daily. 90 tablet 2    atorvastatin (LIPITOR) 80 MG tablet Take 1 tablet (80 mg total) by mouth once daily. 90 tablet 3    diclofenac (VOLTAREN) 75 MG EC tablet Take 75 " mg by mouth 2 (two) times daily.      ergocalciferol (ERGOCALCIFEROL) 50,000 unit Cap Take 1 capsule by mouth weekly for 12 weeks then take 1 capsule monthly after that 12 capsule 2    esomeprazole (NEXIUM) 20 MG capsule TAKE 1 CAPSULE BEFORE      BREAKFAST 90 capsule 0    gabapentin (NEURONTIN) 100 MG capsule Take 1 capsule (100 mg total) by mouth 3 (three) times daily. 90 capsule 5    guaiFENesin 1,200 mg Ta12 Take 1 tablet by mouth every 12 (twelve) hours as needed (cough and congestion). 60 tablet 1    hydroCHLOROthiazide (MICROZIDE) 12.5 mg capsule       HYDROcodone-acetaminophen (NORCO) 5-325 mg per tablet Take 1 tablet by mouth 3 (three) times daily.      methylPREDNISolone (MEDROL DOSEPACK) 4 mg tablet use as directed 21 each 0    mineral oil-hydrophil petrolat (AQUAPHOR) Oint Apply topically as needed (dry skin). 396 g 1    omega 3-dha-epa-fish oil (FISH OIL) 1,000 mg (120 mg-180 mg) Cap Take 1 capsule by mouth once daily. 90 capsule 3    omega-3 acid ethyl esters (LOVAZA) 1 gram capsule Take 1 capsule by mouth.      potassium chloride SA (K-DUR,KLOR-CON) 20 MEQ tablet Take 1 tablet (20 mEq total) by mouth 2 (two) times daily. 180 tablet 2    tadalafiL (CIALIS) 5 MG tablet Take one tablet prn before sexual activity; effects may last 36 hours. Max dose is 20 mg/dose up to 1 dose/24 hours. 10 tablet 1    timolol maleate 0.5% (TIMOPTIC) 0.5 % Drop PUT 1 DROP INTO AFFECTED EYE(S) EVERY MORNING       No current facility-administered medications on file prior to visit.       Medical/Social/Family History:  Past Medical History:   Diagnosis Date    BMI 45.0-49.9, adult 06/20/2018    Cervicalgia 04/07/2021    Diabetes mellitus, type 2     DJD (degenerative joint disease) 03/15/2019    Edema 01/27/2021    Essential hypertension 04/07/2021    GERD (gastroesophageal reflux disease) 01/13/2021    Hyperlipidemia     Hypertension     Low back pain 06/19/2019    Osteoporosis     Pain in right shoulder 04/07/2021     "Tachycardia 04/07/2021      Social History     Tobacco Use   Smoking Status Never   Smokeless Tobacco Never      Social History     Substance and Sexual Activity   Alcohol Use Yes       Family History   Problem Relation Name Age of Onset    Diabetes Mother          AGE 78    Diabetes Father          AGE 54    Hypertension Father      No Known Problems Brother          AGE 55    No Known Problems Brother          AGE 49    Hypertension Brother          AGE 47    No Known Problems Daughter      No Known Problems Daughter      No Known Problems Daughter      No Known Problems Son      No Known Problems Son        Past Surgical History:   Procedure Laterality Date    CARPAL TUNNEL RELEASE Left 2/17/2022    Procedure: RELEASE, CARPAL TUNNEL;  Surgeon: Wilber Crocker MD;  Location: [facility] OR;  Service: Orthopedics;  Laterality: Left;    PATELLA TENDONS REPAIR Bilateral     RADIOFREQUENCY ABLATION Left 2/11/2022    Procedure: Left GSV RF Ablation;  Surgeon: Collins Canada DO;  Location: [facility] OR;  Service: Vascular;  Laterality: Left;    TORN MENISCUS Left     ULNAR NERVE TRANSPOSITION Left 2/17/2022    Procedure: TRANSPOSITION, NERVE, ULNAR;  Surgeon: Wilber Crocker MD;  Location: [facility] OR;  Service: Orthopedics;  Laterality: Left;    WRIST FRACTURE SURGERY Left         There is no immunization history on file for this patient.       Objective:      Vitals:    05/21/24 1222 05/21/24 1243   BP: (!) 105/59 (!) 103/52   BP Location: Left arm Left arm   Patient Position: Sitting Sitting   BP Method: Large (Automatic) Large (Automatic)   Pulse: 85    Resp: 18    Temp: 97.4 °F (36.3 °C)    TempSrc: Oral    SpO2: 96%    Weight: (!) 143.3 kg (316 lb)    Height: 5' 8" (1.727 m)      Body mass index is 48.05 kg/m².     Physical Exam: Physical Exam  Constitutional:       General: He is not in acute distress.     Appearance: Normal appearance. He is not ill-appearing, toxic-appearing or diaphoretic.   HENT: "      Head: Normocephalic.      Mouth/Throat:      Mouth: Mucous membranes are moist.   Eyes:      Extraocular Movements: Extraocular movements intact.      Conjunctiva/sclera: Conjunctivae normal.      Pupils: Pupils are equal, round, and reactive to light.   Cardiovascular:      Rate and Rhythm: Normal rate.      Comments: Patient has premature beats noted during auscultation  Pulmonary:      Effort: Pulmonary effort is normal. No respiratory distress.      Breath sounds: Normal breath sounds. No stridor. No wheezing, rhonchi or rales.   Musculoskeletal:         General: Normal range of motion.      Cervical back: Normal range of motion and neck supple.   Skin:     General: Skin is warm and dry.   Neurological:      General: No focal deficit present.      Mental Status: He is alert and oriented to person, place, and time.      Gait: Gait normal.   Psychiatric:         Mood and Affect: Mood normal.         Behavior: Behavior normal.                Assessment:          ICD-10-CM ICD-9-CM   1. Hypokalemia  E87.6 276.8   2. Hypotension, unspecified hypotension type  I95.9 458.9   3. Cardiac arrhythmia, unspecified cardiac arrhythmia type  I49.9 427.9   4. Primary hypertension  I10 401.9        Plan:       Hypokalemia  -     Basic Metabolic Panel; Future; Expected date: 05/21/2024    Cardiac arrhythmia, unspecified cardiac arrhythmia type  -     POCT EKG 12-LEAD (Manually Resulted by Ordering Provider)    Primary hypertension        - Continue current plan of care. Monitor blood pressure.     Hypotension        - Patient to RTC in one week for blood pressure check.       Recommend to limit alcohol and caffeine intake.       EKG results: sinus rhythm with frequent supraventricular premature complexes    Patient has cardiology appointment scheduled for 6/11/2024.    New & refilled meds:  Requested Prescriptions      No prescriptions requested or ordered in this encounter       Follow up in about 1 week (around 5/28/2024),  "or if symptoms worsen or fail to improve, for recheck blood pressure due to hypotension.     Patient Instructions   Patient Education       Controlling Your Blood Pressure Through Lifestyle   The Basics   Written by the doctors and editors at [facility]   What does my lifestyle have to do with my blood pressure? -- The things you do and the foods you eat have a big effect on your blood pressure and your overall health. Following the right lifestyle can:  Lower your blood pressure or keep you from getting high blood pressure in the first place  Reduce your need for blood pressure medicines  Make medicines for high blood pressure work better, if you do take them  Lower the chances that you'll have a heart attack or stroke, or develop kidney disease  Which lifestyle choices will help lower my blood pressure? -- Here's what you can do:  Lose weight (if you are overweight)  Choose a diet rich in fruits, vegetables, and low-fat dairy products, and low in meats, sweets, and refined grains  Eat less salt (sodium)  Do something active for at least 30 minutes a day on most days of the week  Limit the amount of alcohol you drink  If you have high blood pressure, it's also very important to quit smoking (if you smoke). Quitting smoking might not bring your blood pressure down. But it will lower the chances that you'll have a heart attack or stroke, and it will help you feel better and live longer.  Start low and go slow -- The changes listed above might sound like a lot, but don't worry. You don't have to change everything all at once. The key to improving your lifestyle is to "start low and go slow." Choose 1 small, specific thing to change and try doing it for a while. If it works for you, keep doing it until it becomes a habit. If it doesn't, don't give up. Choose something else to change and see how that goes.  Let's say, for example, that you would like to improve your diet. If you're the type of person who eats " "cheeseburgers and French fries all the time, you can't switch to eating just salads from one day to the next. When people try to make changes like that, they often fail. Then they feel frustrated and tend to give up. So instead of trying to change everything about your diet in 1 day, change 1 or 2 small things about your diet and give yourself time to get used to those changes. For instance, keep the cheeseburger but give up the French fries. Or eat the same things but cut your portions in half.  As you find things that you are able to change and stick with, keep adding new changes. In time, you will see that you can actually change a lot. You just have to get used to the changes slowly.  Lose weight -- When people think about losing weight, they sometimes make it more complicated than it really is. To lose weight, you have to either eat less or move more. If you do both of those things, it's even better. But there is no single weight-loss diet or activity that's better than any other. When it comes to weight loss, the most effective plan is the one that you'll stick with.  Improve your diet -- There is no single diet that is right for everyone. But in general, a healthy diet can include:  Lots of fruits, vegetables, and whole grains  Some beans, peas, lentils, chickpeas, and similar foods  Some nuts, such as walnuts, almonds, and peanuts  Fat-free or low-fat milk and milk products  Some fish  To have a healthy diet, it's also important to limit or avoid sugar, sweets, meats, and refined grains. (Refined grains are found in white bread, white rice, most forms of pasta, and most packaged "snack" foods.)  Reduce salt -- Many people think that eating a low-sodium diet means avoiding the salt shaker and not adding salt when cooking. The truth is, not adding salt at the table or when you cook will only help a little. Almost all of the sodium you eat is already in the food you buy at the grocery store or at restaurants " "(figure 1).  The most important thing you can do to cut down on sodium is to eat less processed food. That means that you should avoid most foods that are sold in cans, boxes, jars, and bags. You should also eat in restaurants less often.  To reduce the amount of sodium you get, buy fresh or fresh-frozen fruits, vegetables, and meats. (Fresh-frozen foods have had nothing added to them before freezing.) Then you can make meals at home, from scratch, with these ingredients.  As with the other changes, don't try to cut out salt all at once. Instead, choose 1 or 2 foods that have a lot of sodium and try to replace them with low-sodium choices. When you get used to those low-sodium options, find another food or 2 to change. Then keep going, until all the foods you eat are sodium-free or low in sodium.  Become more active -- If you want to be more active, you don't have to go to the gym or get all sweaty. It is possible to increase your activity level while doing everyday things you enjoy. Walking, gardening, and dancing are just a few of the things that you might try. As with all the other changes, the key is not to do too much too fast. If you don't do any activity now, start by walking for just a few minutes every other day. Do that for a few weeks. If you stick with it, try doing it for longer. But if you find that you don't like walking, try a different activity.  Drink less alcohol -- If you are a woman, do not have more than 1 "standard drink" of alcohol a day. If you are a man, do not have more than 2. A "standard drink" is:  A can or bottle that has 12 ounces of beer  A glass that has 5 ounces of wine  A shot that has 1.5 ounces of whiskey  Where should I start? -- If you want to improve your lifestyle, start by making the changes that you think would be easiest for you. If you used to exercise and just got out of the habit, maybe it would be easy for you to start exercising again. Or if you actually like cooking " "meals from scratch, maybe the first thing you should focus on is eating home-cooked meals that are low in sodium.  Whatever you tackle first, choose specific, realistic goals, and give yourself a deadline. For example, do not decide that you are going to "exercise more." Instead, decide that you are going to walk for 10 minutes on Monday, Wednesday, and Friday, and that you are going to do this for the next 2 weeks.  When lifestyle changes are too general, people have a hard time following through.  Now go. You can do it!  All topics are updated as new evidence becomes available and our peer review process is complete.  This topic retrieved from [employer] on: Sep 21, 2021.  Topic 08162 Version 8.0  Release: 29.4.2 - C29.263  © 2021 UpToDate, Inc. and/or its affiliates. All rights reserved.  figure 1: Sources of sodium in your diet     Graphic 98434 Version 2.0    Consumer Information Use and Disclaimer   This information is not specific medical advice and does not replace information you receive from your health care provider. This is only a brief summary of general information. It does NOT include all information about conditions, illnesses, injuries, tests, procedures, treatments, therapies, discharge instructions or life-style choices that may apply to you. You must talk with your health care provider for complete information about your health and treatment options. This information should not be used to decide whether or not to accept your health care provider's advice, instructions or recommendations. Only your health care provider has the knowledge and training to provide advice that is right for you. The use of this information is governed by the [employer] End User License Agreement, available at https://www.[facility]/en/solutions/QBInternational/about/link.The use of [employer] content is governed by the [employer] Terms of Use. ©2021 UpToDate, Inc. All rights reserved.  Copyright   © 2021 UpToDate, Inc. and/or " its affiliates. All rights reserved.           Signature: Huey Mtz DNP, DEZP-C

## 2024-05-23 ENCOUNTER — TELEPHONE (OUTPATIENT)
Dept: PRIMARY CARE CLINIC | Facility: CLINIC | Age: 54
End: 2024-05-23
Payer: COMMERCIAL

## 2024-05-23 NOTE — TELEPHONE ENCOUNTER
----- Message from Huey Mtz DNP, KIRK-C sent at 5/22/2024  8:34 AM CDT -----  Please notify of results.     Potassium has improved.     BUN and BUN/Creatinine Ratio elevated some. Needs to drink more water.    closed the  last procedure with a Vicryl mesh, and this was removed from the rectus  fascia. The patient has an extremely hostile abdomen at this stage due  to an inflammatory phlegmon involving essentially the entire small  bowel. We began inspecting all quadrants, suctioning clear all fluid. Initially, there was no obvious ongoing leakage and we initially  inspected the site of repair from 48 hours ago. These adhesions were  loose and the bowel could be manipulated without difficulty. That  repair was identified and was intact with no active extravasation. As  we began inspecting the remaining abdomen, a new perforation was now  seen in the right mid abdomen. This was in an area of dense  inflammatory reaction and the dissection was extremely tedious due to  adhesion of the small bowel against the abdominal wall, although, there  was a clear perforation noted. We dissected just enough to identify the  edges of the defect. Again, the tissue in this area was extremely  inflamed and friable, however, we were able to do a two-layer closure  with 3-0 Vicryl and 3-0 PDS, which appeared to show good closure of the  perforation. We continued to inspect the abdomen as much as possible while at the  same time trying to avoid unnecessary dissection given the high risk of  ongoing enterotomies, serosal tears or other issues. We were  particularly limited on the left side where the patient's ileal conduit  courses into the retroperitoneum and I did not want to disrupt the  ureters. At this point, we irrigated the abdomen clear and inspected  for any extravasation. Clean laparotomy sponges were packed into all  the areas of dissection and any area where there was visible bowel. This was irrigated and then left in place for 5 minutes. The sponges  were removed and there was no evidence of bile staining or stool on any  of the sponges.   We now repeated this process twice more and each time  the sponges remained clear despite us palpating the abdomen in all  quadrants in an effort to expose any obvious draining sites. Once we  were convinced there was no ongoing drainage, hemostasis was confirmed  and again a Vicryl mesh was placed anterior to the rectus fascia and  anchored with interrupted sutures of 3-0 Vicryl. The skin was closed  with a combination of 2-0 nylons and staples. Dressings were applied  and the patient was transferred to recovery in stable condition. The patient's abdomen has grown increasingly hostile due to ongoing  healing and now multiple explorations. He is clearly behind from a  nutritional standpoint due to his initial reluctance to receive TPN. This has since been initiated and he is tolerating this. He also was  reluctant to allow for nasogastric decompression and the bowel remains  markedly dilated, however, for the last 48 hours he has been tolerant of  an NG tube which remains with high output. His bowel does remain  distended despite NG decompression but this will be continued in the  hopes that between decompression, antibiotics and ongoing nutritional  support, he can heal with no further perforations. Case was discussed  with the patient and his mother and they are in agreement with the  current treatment plan.         Dieter Salas MD    D: 06/09/2021 17:46:29       T: 06/09/2021 17:54:44     [email or web address]  Job#: [id number]     Doc#: [id number]    CC:

## 2024-05-29 DIAGNOSIS — K21.9 GASTROESOPHAGEAL REFLUX DISEASE, UNSPECIFIED WHETHER ESOPHAGITIS PRESENT: ICD-10-CM

## 2024-05-30 RX ORDER — HYDROGEN PEROXIDE 3 %
20 SOLUTION, NON-ORAL MISCELLANEOUS
Qty: 90 CAPSULE | Refills: 0 | Status: SHIPPED | OUTPATIENT
Start: 2024-05-30

## 2024-06-03 ENCOUNTER — TELEPHONE (OUTPATIENT)
Dept: PRIMARY CARE CLINIC | Facility: CLINIC | Age: 54
End: 2024-06-03
Payer: COMMERCIAL

## 2024-06-03 NOTE — TELEPHONE ENCOUNTER
----- Message from Huey Mtz DNP, KIRK-C sent at 5/22/2024  8:34 AM CDT -----  Please notify of results.     Potassium has improved.     BUN and BUN/Creatinine Ratio elevated some. Needs to drink more water.

## 2024-06-07 ENCOUNTER — CLINICAL SUPPORT (OUTPATIENT)
Dept: PRIMARY CARE CLINIC | Facility: CLINIC | Age: 54
End: 2024-06-07
Payer: COMMERCIAL

## 2024-06-07 VITALS — DIASTOLIC BLOOD PRESSURE: 90 MMHG | SYSTOLIC BLOOD PRESSURE: 146 MMHG

## 2024-06-07 DIAGNOSIS — I10 ESSENTIAL HYPERTENSION: Primary | ICD-10-CM

## 2024-06-07 DIAGNOSIS — E87.6 HYPOKALEMIA: Primary | ICD-10-CM

## 2024-06-10 ENCOUNTER — TELEPHONE (OUTPATIENT)
Dept: PRIMARY CARE CLINIC | Facility: CLINIC | Age: 54
End: 2024-06-10
Payer: COMMERCIAL

## 2024-06-10 NOTE — TELEPHONE ENCOUNTER
----- Message from Huey Mtz DNP, KIRK-C sent at 6/10/2024  8:44 AM CDT -----  Please notify of results.     Potassium is still good.

## 2024-06-11 ENCOUNTER — OFFICE VISIT (OUTPATIENT)
Dept: CARDIOLOGY | Facility: CLINIC | Age: 54
End: 2024-06-11
Payer: COMMERCIAL

## 2024-06-11 VITALS
DIASTOLIC BLOOD PRESSURE: 90 MMHG | RESPIRATION RATE: 18 BRPM | HEIGHT: 68 IN | HEART RATE: 100 BPM | SYSTOLIC BLOOD PRESSURE: 150 MMHG | WEIGHT: 302 LBS | BODY MASS INDEX: 45.77 KG/M2

## 2024-06-11 DIAGNOSIS — I10 BENIGN ESSENTIAL HTN: ICD-10-CM

## 2024-06-11 DIAGNOSIS — R94.31 ABNORMAL EKG: ICD-10-CM

## 2024-06-11 DIAGNOSIS — M79.89 LEG SWELLING: Primary | ICD-10-CM

## 2024-06-11 DIAGNOSIS — Z71.41 ALCOHOL CESSATION COUNSELING: ICD-10-CM

## 2024-06-11 DIAGNOSIS — R60.0 EDEMA, LOWER EXTREMITY: Primary | ICD-10-CM

## 2024-06-11 DIAGNOSIS — R60.0 EDEMA, LOWER EXTREMITY: ICD-10-CM

## 2024-06-11 PROCEDURE — 3008F BODY MASS INDEX DOCD: CPT | Mod: ,,, | Performed by: STUDENT IN AN ORGANIZED HEALTH CARE EDUCATION/TRAINING PROGRAM

## 2024-06-11 PROCEDURE — 99214 OFFICE O/P EST MOD 30 MIN: CPT | Mod: PBBFAC | Performed by: STUDENT IN AN ORGANIZED HEALTH CARE EDUCATION/TRAINING PROGRAM

## 2024-06-11 PROCEDURE — 3077F SYST BP >= 140 MM HG: CPT | Mod: ,,, | Performed by: STUDENT IN AN ORGANIZED HEALTH CARE EDUCATION/TRAINING PROGRAM

## 2024-06-11 PROCEDURE — 99204 OFFICE O/P NEW MOD 45 MIN: CPT | Mod: S$PBB,,, | Performed by: STUDENT IN AN ORGANIZED HEALTH CARE EDUCATION/TRAINING PROGRAM

## 2024-06-11 PROCEDURE — 3044F HG A1C LEVEL LT 7.0%: CPT | Mod: ,,, | Performed by: STUDENT IN AN ORGANIZED HEALTH CARE EDUCATION/TRAINING PROGRAM

## 2024-06-11 PROCEDURE — 3080F DIAST BP >= 90 MM HG: CPT | Mod: ,,, | Performed by: STUDENT IN AN ORGANIZED HEALTH CARE EDUCATION/TRAINING PROGRAM

## 2024-06-11 PROCEDURE — 1159F MED LIST DOCD IN RCRD: CPT | Mod: ,,, | Performed by: STUDENT IN AN ORGANIZED HEALTH CARE EDUCATION/TRAINING PROGRAM

## 2024-06-11 PROCEDURE — 3061F NEG MICROALBUMINURIA REV: CPT | Mod: ,,, | Performed by: STUDENT IN AN ORGANIZED HEALTH CARE EDUCATION/TRAINING PROGRAM

## 2024-06-11 PROCEDURE — 3066F NEPHROPATHY DOC TX: CPT | Mod: ,,, | Performed by: STUDENT IN AN ORGANIZED HEALTH CARE EDUCATION/TRAINING PROGRAM

## 2024-06-11 RX ORDER — CARVEDILOL 3.12 MG/1
3.12 TABLET ORAL 2 TIMES DAILY WITH MEALS
Qty: 180 TABLET | Refills: 3 | Status: SHIPPED | OUTPATIENT
Start: 2024-06-11 | End: 2025-06-11

## 2024-06-11 NOTE — PROGRESS NOTES
PCP: Huey tMz, WENDY, FNP-C    Referring Provider:     Subjective:   Papo Bell is a 53 y.o. male with hx of obesity, ETOH Use (upto 1 pint of whiskey/day), transaminitis, HTN, HLD who presents for evaluation of abnormal EKG.     Patient denies any symptoms. He is tachycardic to 120s in clinic. Denies any palpitation, CP, SOB. He does report bilateral leg swelling for which he goes to the vein clinic. He is nervous in the clinic. Occasionally take albuterol inh.       Fhx: non-contributary  Shx: ETOH use (upto 1 pint of whiskey/day), denies smoking, or drug use    EKG - 6/11/24 - Sinus tachycardia with PVC, LPFB, TW abnl.       Lab Results   Component Value Date     06/07/2024    K 3.7 06/07/2024     (H) 06/07/2024    CO2 26 06/07/2024    BUN 14 06/07/2024    CREATININE 0.90 06/07/2024    CALCIUM 8.3 (L) 06/07/2024    ANIONGAP 10 06/07/2024    ESTGFRAFRICA 85 12/06/2021    EGFRNONAA 90 07/12/2022       Lab Results   Component Value Date    CHOL 133 04/23/2024    CHOL 318 (H) 01/03/2024    CHOL 131 01/30/2023     Lab Results   Component Value Date    HDL 70 (H) 04/23/2024    HDL 83 (H) 01/03/2024    HDL 63 (H) 01/30/2023     Lab Results   Component Value Date    LDLCALC 32 04/23/2024    LDLCALC 42 01/30/2023    LDLCALC 29 07/12/2022     Lab Results   Component Value Date    TRIG 155 (H) 04/23/2024    TRIG 1,145 (H) 01/03/2024    TRIG 129 01/30/2023     Lab Results   Component Value Date    CHOLHDL 1.9 04/23/2024    CHOLHDL 3.8 01/03/2024    CHOLHDL 2.1 01/30/2023       Lab Results   Component Value Date    WBC 6.44 05/14/2024    HGB 13.3 (L) 05/14/2024    HCT 39.4 (L) 05/14/2024    .5 (H) 05/14/2024     (L) 05/14/2024           Current Outpatient Medications:     amLODIPine (NORVASC) 10 MG tablet, TAKE 1 TABLET BY MOUTH EVERYDAY FOR BLOOD PRESSURE., Disp: 7 tablet, Rfl: 0    aspirin (ECOTRIN) 81 MG EC tablet, Take 1 tablet (81 mg total) by mouth 2 (two) times daily., Disp: 90  "tablet, Rfl: 2    atorvastatin (LIPITOR) 80 MG tablet, Take 1 tablet (80 mg total) by mouth once daily., Disp: 90 tablet, Rfl: 3    diclofenac (VOLTAREN) 75 MG EC tablet, Take 75 mg by mouth 2 (two) times daily., Disp: , Rfl:     ergocalciferol (ERGOCALCIFEROL) 50,000 unit Cap, Take 1 capsule by mouth weekly for 12 weeks then take 1 capsule monthly after that, Disp: 12 capsule, Rfl: 2    esomeprazole (NEXIUM) 20 MG capsule, TAKE 1 CAPSULE BEFORE      BREAKFAST, Disp: 90 capsule, Rfl: 0    gabapentin (NEURONTIN) 100 MG capsule, Take 1 capsule (100 mg total) by mouth 3 (three) times daily., Disp: 90 capsule, Rfl: 5    omega-3 acid ethyl esters (LOVAZA) 1 gram capsule, Take 1 capsule by mouth., Disp: , Rfl:     potassium chloride SA (K-DUR,KLOR-CON) 20 MEQ tablet, Take 1 tablet (20 mEq total) by mouth 2 (two) times daily., Disp: 180 tablet, Rfl: 2    carvediloL (COREG) 3.125 MG tablet, Take 1 tablet (3.125 mg total) by mouth 2 (two) times daily with meals., Disp: 180 tablet, Rfl: 3    mineral oil-hydrophil petrolat (AQUAPHOR) Oint, Apply topically as needed (dry skin). (Patient not taking: Reported on 6/11/2024), Disp: 396 g, Rfl: 1    omega 3-dha-epa-fish oil (FISH OIL) 1,000 mg (120 mg-180 mg) Cap, Take 1 capsule by mouth once daily., Disp: 90 capsule, Rfl: 3    tadalafiL (CIALIS) 5 MG tablet, Take one tablet prn before sexual activity; effects may last 36 hours. Max dose is 20 mg/dose up to 1 dose/24 hours., Disp: 10 tablet, Rfl: 1    Review of Systems   Respiratory:  Negative for cough and shortness of breath.    Cardiovascular:  Positive for leg swelling. Negative for chest pain, palpitations, orthopnea, claudication and PND.         Objective:   BP (!) 150/90   Pulse 100   Resp 18   Ht 5' 8" (1.727 m)   Wt (!) 137 kg (302 lb)   BMI 45.92 kg/m²     Physical Exam  Vitals and nursing note reviewed.   Constitutional:       Appearance: Normal appearance.   Cardiovascular:      Rate and Rhythm: Regular rhythm. " Tachycardia present.      Pulses: Normal pulses.      Heart sounds: Normal heart sounds.   Pulmonary:      Breath sounds: Normal breath sounds.   Musculoskeletal:      Right lower leg: Edema present.      Left lower leg: Edema present.   Neurological:      Mental Status: He is alert and oriented to person, place, and time.           Assessment:     1. Leg swelling  Echo    NT-Pro Natriuretic Peptide      2. Abnormal EKG  Ambulatory referral/consult to Cardiology    Echo    NT-Pro Natriuretic Peptide      3. Benign essential HTN        4. Alcohol cessation counseling              Plan:   Leg swelling  2/2 to venous disease; however will check NTproBNP and ECHO    Benign essential HTN  Uncontrolled. On amlodipine 10mg qd  Start coreg 3.125mg BID    Alcohol cessation counseling  Upto 1 pint of whiskey/day  Elevated AST/ALT  Tachycardic  - ETOH use - education cessation given

## 2024-06-20 ENCOUNTER — OFFICE VISIT (OUTPATIENT)
Dept: GASTROENTEROLOGY | Facility: CLINIC | Age: 54
End: 2024-06-20
Payer: COMMERCIAL

## 2024-06-20 VITALS
BODY MASS INDEX: 47.5 KG/M2 | SYSTOLIC BLOOD PRESSURE: 132 MMHG | WEIGHT: 313.38 LBS | DIASTOLIC BLOOD PRESSURE: 87 MMHG | OXYGEN SATURATION: 97 % | HEART RATE: 110 BPM | HEIGHT: 68 IN

## 2024-06-20 DIAGNOSIS — K92.1 BLOOD IN STOOL: ICD-10-CM

## 2024-06-20 DIAGNOSIS — R74.8 ELEVATED LIVER ENZYMES: Primary | ICD-10-CM

## 2024-06-20 PROCEDURE — 99999 PR PBB SHADOW E&M-EST. PATIENT-LVL V: CPT | Mod: PBBFAC,,,

## 2024-06-20 PROCEDURE — 99215 OFFICE O/P EST HI 40 MIN: CPT | Mod: PBBFAC

## 2024-06-20 RX ORDER — POLYETHYLENE GLYCOL 3350, SODIUM SULFATE ANHYDROUS, SODIUM BICARBONATE, SODIUM CHLORIDE, POTASSIUM CHLORIDE 236; 22.74; 6.74; 5.86; 2.97 G/4L; G/4L; G/4L; G/4L; G/4L
4 POWDER, FOR SOLUTION ORAL ONCE
Qty: 4000 ML | Refills: 0 | Status: SHIPPED | OUTPATIENT
Start: 2024-06-20 | End: 2024-06-20

## 2024-07-01 ENCOUNTER — HOSPITAL ENCOUNTER (OUTPATIENT)
Dept: RADIOLOGY | Facility: HOSPITAL | Age: 54
Discharge: HOME | End: 2024-07-01
Payer: COMMERCIAL

## 2024-07-01 ENCOUNTER — TELEPHONE (OUTPATIENT)
Dept: GASTROENTEROLOGY | Facility: CLINIC | Age: 54
End: 2024-07-01
Payer: COMMERCIAL

## 2024-07-01 ENCOUNTER — HOSPITAL ENCOUNTER (OUTPATIENT)
Dept: CARDIOLOGY | Facility: HOSPITAL | Age: 54
Discharge: HOME | End: 2024-07-01
Attending: STUDENT IN AN ORGANIZED HEALTH CARE EDUCATION/TRAINING PROGRAM
Payer: COMMERCIAL

## 2024-07-01 DIAGNOSIS — R74.8 ELEVATED LIVER ENZYMES: ICD-10-CM

## 2024-07-01 DIAGNOSIS — M79.89 LEG SWELLING: ICD-10-CM

## 2024-07-01 DIAGNOSIS — R94.31 ABNORMAL EKG: ICD-10-CM

## 2024-07-01 LAB
AORTIC ROOT ANNULUS: 3.12 CM
AORTIC VALVE CUSP SEPERATION: 2.12 CM
AV INDEX (PROSTH): 0.64
AV MEAN GRADIENT: 8 MMHG
AV PEAK GRADIENT: 13 MMHG
AV VALVE AREA BY VELOCITY RATIO: 2.39 CM²
AV VALVE AREA: 2.72 CM²
AV VELOCITY RATIO: 0.56
CV ECHO LV RWT: 0.59 CM
DOP CALC AO PEAK VEL: 1.82 M/S
DOP CALC AO VTI: 28.2 CM
DOP CALC LVOT AREA: 4.3 CM2
DOP CALC LVOT DIAMETER: 2.33 CM
DOP CALC LVOT PEAK VEL: 1.02 M/S
DOP CALC LVOT STROKE VOLUME: 76.71 CM3
DOP CALCLVOT PEAK VEL VTI: 18 CM
E WAVE DECELERATION TIME: 104.64 MSEC
E/A RATIO: 0.71
E/E' RATIO: 9.06 M/S
ECHO LV POSTERIOR WALL: 1.38 CM (ref 0.6–1.1)
FRACTIONAL SHORTENING: 30 % (ref 28–44)
INTERVENTRICULAR SEPTUM: 1.37 CM (ref 0.6–1.1)
LEFT ATRIUM AREA SYSTOLIC (APICAL 2 CHAMBER): 15.99 CM2
LEFT ATRIUM AREA SYSTOLIC (APICAL 4 CHAMBER): 16.48 CM2
LEFT ATRIUM VOLUME MOD: 40.74 CM3
LEFT INTERNAL DIMENSION IN SYSTOLE: 3.27 CM (ref 2.1–4)
LEFT VENTRICLE DIASTOLIC VOLUME: 100.5 ML
LEFT VENTRICLE END SYSTOLIC VOLUME APICAL 2 CHAMBER: 35.5 ML
LEFT VENTRICLE END SYSTOLIC VOLUME APICAL 4 CHAMBER: 41.05 ML
LEFT VENTRICLE SYSTOLIC VOLUME: 43.05 ML
LEFT VENTRICULAR INTERNAL DIMENSION IN DIASTOLE: 4.66 CM (ref 3.5–6)
LEFT VENTRICULAR MASS: 254.92 G
LV LATERAL E/E' RATIO: 7 M/S
LV SEPTAL E/E' RATIO: 12.83 M/S
LVED V (TEICH): 100.5 ML
LVES V (TEICH): 43.05 ML
LVOT MG: 2.73 MMHG
LVOT MV: 0.81 CM/S
MV PEAK A VEL: 1.09 M/S
MV PEAK E VEL: 0.77 M/S
MV STENOSIS PRESSURE HALF TIME: 30.34 MS
MV VALVE AREA P 1/2 METHOD: 7.25 CM2
PISA MRMAX VEL: 1.15 M/S
PISA TR MAX VEL: 1.92 M/S
PV PEAK GRADIENT: 5 MMHG
PV PEAK VELOCITY: 1.12 M/S
RA VOL SYS: 21.17 ML
RIGHT ATRIAL AREA: 11.3 CM2
RIGHT ATRIUM VOLUME AREA LENGTH APICAL 4 CHAMBER: 20.11 ML
RIGHT VENTRICLE DIASTOLIC BASEL DIMENSION: 2.3 CM
RIGHT VENTRICLE DIASTOLIC LENGTH: 7 CM
RIGHT VENTRICLE DIASTOLIC MID DIMENSION: 2.4 CM
RIGHT VENTRICULAR LENGTH IN DIASTOLE (APICAL 4-CHAMBER VIEW): 6.95 CM
RV MID DIAMA: 2.41 CM
TDI LATERAL: 0.11 M/S
TDI SEPTAL: 0.06 M/S
TDI: 0.09 M/S
TR MAX PG: 15 MMHG
TRICUSPID ANNULAR PLANE SYSTOLIC EXCURSION: 2.13 CM

## 2024-07-01 PROCEDURE — 85610 PROTHROMBIN TIME: CPT

## 2024-07-01 PROCEDURE — 76705 ECHO EXAM OF ABDOMEN: CPT | Mod: 26,,, | Performed by: RADIOLOGY

## 2024-07-01 PROCEDURE — 93306 TTE W/DOPPLER COMPLETE: CPT | Mod: 26,,, | Performed by: STUDENT IN AN ORGANIZED HEALTH CARE EDUCATION/TRAINING PROGRAM

## 2024-07-01 PROCEDURE — 93306 TTE W/DOPPLER COMPLETE: CPT

## 2024-07-01 PROCEDURE — 76705 ECHO EXAM OF ABDOMEN: CPT | Mod: TC

## 2024-07-01 NOTE — PROGRESS NOTES
Diffuse fatty infiltration of the liver.   He needs to stop drinking alcohol completely  Exercise 150 minutes per week  Weight loss of 7-10%. Weight loss should be gradual  Diet low in saturated fats and carbohydrates  Good glucose and cholesterol control

## 2024-07-01 NOTE — TELEPHONE ENCOUNTER
Results and recommendations called to patient. Verbalized understanding.                ----- Message from Mamie Hatfield NP sent at 7/1/2024 12:53 PM CDT -----  Diffuse fatty infiltration of the liver.   He needs to stop drinking alcohol completely  Exercise 150 minutes per week  Weight loss of 7-10%. Weight loss should be gradual  Diet low in saturated fats and carbohydrates  Good glucose and cholesterol control

## 2024-07-02 RX ORDER — CARVEDILOL 6.25 MG/1
6.25 TABLET ORAL 2 TIMES DAILY WITH MEALS
Qty: 180 TABLET | Refills: 1 | Status: SHIPPED | OUTPATIENT
Start: 2024-07-02

## 2024-07-02 RX ORDER — CARVEDILOL 6.25 MG/1
6.25 TABLET ORAL 2 TIMES DAILY WITH MEALS
COMMUNITY
End: 2024-07-02 | Stop reason: SDUPTHER

## 2024-07-02 NOTE — PROGRESS NOTES
Pt. Notifiec echo borderline abn EF 45-50% normal is 55% increase coreg to 6.25 mg bid per Dr. Cortes.(pt. Will take 3.125mg 2 tabs twice daily until out then start 6.25mg bid) Pt. Voiced understanding.

## 2024-07-24 ENCOUNTER — TELEPHONE (OUTPATIENT)
Dept: PRIMARY CARE CLINIC | Facility: CLINIC | Age: 54
End: 2024-07-24
Payer: COMMERCIAL

## 2024-07-24 DIAGNOSIS — E11.9 TYPE 2 DIABETES MELLITUS WITHOUT COMPLICATION, WITHOUT LONG-TERM CURRENT USE OF INSULIN: Primary | ICD-10-CM

## 2024-07-30 ENCOUNTER — TELEPHONE (OUTPATIENT)
Dept: GASTROENTEROLOGY | Facility: CLINIC | Age: 54
End: 2024-07-30
Payer: COMMERCIAL

## 2024-07-30 NOTE — TELEPHONE ENCOUNTER
Attempted to call results. Left message.              ----- Message from Mamie Hatfield NP sent at 7/29/2024  3:37 PM CDT -----  Liver enzymes are elevated AST 56.  Other labs are ok  Exercise 150 minutes per week  Weight loss of 7-10%. Weight loss should be gradual  Diet low in saturated fats and carbohydrates  Good glucose and cholesterol control

## 2024-07-30 NOTE — TELEPHONE ENCOUNTER
Results and recommendations called to patient. Verbalized understanding.            ----- Message from Mamie Hatfield NP sent at 7/29/2024  3:37 PM CDT -----  Liver enzymes are elevated AST 56.  Other labs are ok  Exercise 150 minutes per week  Weight loss of 7-10%. Weight loss should be gradual  Diet low in saturated fats and carbohydrates  Good glucose and cholesterol control

## 2024-08-10 DIAGNOSIS — K21.9 GASTROESOPHAGEAL REFLUX DISEASE, UNSPECIFIED WHETHER ESOPHAGITIS PRESENT: ICD-10-CM

## 2024-08-12 RX ORDER — HYDROGEN PEROXIDE 3 %
20 SOLUTION, NON-ORAL MISCELLANEOUS
Qty: 90 CAPSULE | Refills: 0 | Status: SHIPPED | OUTPATIENT
Start: 2024-08-12

## 2024-08-17 DIAGNOSIS — E78.49 OTHER HYPERLIPIDEMIA: ICD-10-CM

## 2024-08-17 DIAGNOSIS — I10 ESSENTIAL HYPERTENSION: ICD-10-CM

## 2024-08-19 RX ORDER — AMLODIPINE BESYLATE 10 MG/1
10 TABLET ORAL
Qty: 90 TABLET | Refills: 2 | Status: SHIPPED | OUTPATIENT
Start: 2024-08-19

## 2024-08-19 RX ORDER — ATORVASTATIN CALCIUM 20 MG/1
20 TABLET, FILM COATED ORAL NIGHTLY
Qty: 90 TABLET | Refills: 2 | Status: SHIPPED | OUTPATIENT
Start: 2024-08-19

## 2024-09-11 ENCOUNTER — OFFICE VISIT (OUTPATIENT)
Dept: CARDIOLOGY | Facility: CLINIC | Age: 54
End: 2024-09-11
Payer: COMMERCIAL

## 2024-09-11 VITALS
SYSTOLIC BLOOD PRESSURE: 140 MMHG | DIASTOLIC BLOOD PRESSURE: 100 MMHG | WEIGHT: 301 LBS | HEART RATE: 112 BPM | RESPIRATION RATE: 16 BRPM | BODY MASS INDEX: 45.62 KG/M2 | HEIGHT: 68 IN

## 2024-09-11 DIAGNOSIS — Z71.41 ALCOHOL CESSATION COUNSELING: ICD-10-CM

## 2024-09-11 DIAGNOSIS — R00.0 TACHYCARDIA: ICD-10-CM

## 2024-09-11 DIAGNOSIS — I10 BENIGN ESSENTIAL HTN: ICD-10-CM

## 2024-09-11 DIAGNOSIS — I10 BENIGN ESSENTIAL HTN: Primary | ICD-10-CM

## 2024-09-11 DIAGNOSIS — I50.22 HEART FAILURE WITH MID-RANGE EJECTION FRACTION (HFMEF): Primary | ICD-10-CM

## 2024-09-11 PROCEDURE — 3061F NEG MICROALBUMINURIA REV: CPT | Mod: ,,, | Performed by: STUDENT IN AN ORGANIZED HEALTH CARE EDUCATION/TRAINING PROGRAM

## 2024-09-11 PROCEDURE — 3080F DIAST BP >= 90 MM HG: CPT | Mod: ,,, | Performed by: STUDENT IN AN ORGANIZED HEALTH CARE EDUCATION/TRAINING PROGRAM

## 2024-09-11 PROCEDURE — 3044F HG A1C LEVEL LT 7.0%: CPT | Mod: ,,, | Performed by: STUDENT IN AN ORGANIZED HEALTH CARE EDUCATION/TRAINING PROGRAM

## 2024-09-11 PROCEDURE — 3066F NEPHROPATHY DOC TX: CPT | Mod: ,,, | Performed by: STUDENT IN AN ORGANIZED HEALTH CARE EDUCATION/TRAINING PROGRAM

## 2024-09-11 PROCEDURE — 99214 OFFICE O/P EST MOD 30 MIN: CPT | Mod: PBBFAC,25 | Performed by: STUDENT IN AN ORGANIZED HEALTH CARE EDUCATION/TRAINING PROGRAM

## 2024-09-11 PROCEDURE — 3008F BODY MASS INDEX DOCD: CPT | Mod: ,,, | Performed by: STUDENT IN AN ORGANIZED HEALTH CARE EDUCATION/TRAINING PROGRAM

## 2024-09-11 PROCEDURE — 1159F MED LIST DOCD IN RCRD: CPT | Mod: ,,, | Performed by: STUDENT IN AN ORGANIZED HEALTH CARE EDUCATION/TRAINING PROGRAM

## 2024-09-11 PROCEDURE — 99999 PR PBB SHADOW E&M-EST. PATIENT-LVL IV: CPT | Mod: PBBFAC,,, | Performed by: STUDENT IN AN ORGANIZED HEALTH CARE EDUCATION/TRAINING PROGRAM

## 2024-09-11 PROCEDURE — 99214 OFFICE O/P EST MOD 30 MIN: CPT | Mod: S$PBB,,, | Performed by: STUDENT IN AN ORGANIZED HEALTH CARE EDUCATION/TRAINING PROGRAM

## 2024-09-11 PROCEDURE — 93005 ELECTROCARDIOGRAM TRACING: CPT | Mod: PBBFAC | Performed by: STUDENT IN AN ORGANIZED HEALTH CARE EDUCATION/TRAINING PROGRAM

## 2024-09-11 PROCEDURE — 93010 ELECTROCARDIOGRAM REPORT: CPT | Mod: S$PBB,,, | Performed by: STUDENT IN AN ORGANIZED HEALTH CARE EDUCATION/TRAINING PROGRAM

## 2024-09-11 PROCEDURE — 3077F SYST BP >= 140 MM HG: CPT | Mod: ,,, | Performed by: STUDENT IN AN ORGANIZED HEALTH CARE EDUCATION/TRAINING PROGRAM

## 2024-09-11 RX ORDER — CARVEDILOL 12.5 MG/1
12.5 TABLET ORAL 2 TIMES DAILY WITH MEALS
Qty: 180 TABLET | Refills: 3 | Status: SHIPPED | OUTPATIENT
Start: 2024-09-11

## 2024-09-11 NOTE — PROGRESS NOTES
PCP: Huey Mtz, WENDY, FNP-C    Referring Provider:     Subjective:   Papo Bell is a 53 y.o. male with hx of obesity, ETOH Use (upto 1 pint of whiskey/day), transaminitis, HTN, HLD who presents for evaluation of abnormal EKG.     9/11/24 - Doing well. Is hypertensive. ECHO with EF of 45-50%. Denies any complaints. Is tachycardic in clinic    6/11/24 - Patient denies any symptoms. He is tachycardic to 120s in clinic. Denies any palpitation, CP, SOB. He does report bilateral leg swelling for which he goes to the vein clinic. He is nervous in the clinic. Occasionally take albuterol inh.       Fhx: non-contributary  Shx: ETOH use (upto 1 pint of whiskey/day), denies smoking, or drug use    EKG - 6/11/24 - Sinus tachycardia with PVC, LPFB, TW abnl.     ECHO Results for orders placed during the hospital encounter of 07/01/24    Echo    Interpretation Summary    Left Ventricle: Left ventricle was not well visualized due to poor sonic window. The left ventricle is normal in size. Increased wall thickness. Septal thickening. Mild global hypokinesis present. There is mildly reduced systolic function with a visually estimated ejection fraction of 45 - 50%. There is diastolic dysfunction.    Right Ventricle: Normal right ventricular cavity size. Systolic function is normal.    Aortic Valve: The aortic valve is a trileaflet valve.    Mitral Valve: There is mild regurgitation.    Tricuspid Valve: There is mild to moderate regurgitation.    Lab Results   Component Value Date     07/01/2024    K 3.4 (L) 07/01/2024     07/01/2024    CO2 26 07/01/2024    BUN 15 07/01/2024    CREATININE 1.01 07/01/2024    CALCIUM 8.4 (L) 07/01/2024    ANIONGAP 14 07/01/2024    ESTGFRAFRICA 85 12/06/2021    EGFRNONAA 90 07/12/2022       Lab Results   Component Value Date    CHOL 133 04/23/2024    CHOL 318 (H) 01/03/2024    CHOL 131 01/30/2023     Lab Results   Component Value Date    HDL 70 (H) 04/23/2024    HDL 83 (H) 01/03/2024     HDL 63 (H) 01/30/2023     Lab Results   Component Value Date    LDLCALC 32 04/23/2024    LDLCALC 42 01/30/2023    LDLCALC 29 07/12/2022     Lab Results   Component Value Date    TRIG 155 (H) 04/23/2024    TRIG 1,145 (H) 01/03/2024    TRIG 129 01/30/2023     Lab Results   Component Value Date    CHOLHDL 1.9 04/23/2024    CHOLHDL 3.8 01/03/2024    CHOLHDL 2.1 01/30/2023       Lab Results   Component Value Date    WBC 6.62 07/01/2024    HGB 13.2 (L) 07/01/2024    HCT 38.8 (L) 07/01/2024    .8 (H) 07/01/2024     07/01/2024           Current Outpatient Medications:     amLODIPine (NORVASC) 10 MG tablet, TAKE 1 TABLET ONCE DAILY, Disp: 90 tablet, Rfl: 2    aspirin (ECOTRIN) 81 MG EC tablet, Take 1 tablet (81 mg total) by mouth 2 (two) times daily., Disp: 90 tablet, Rfl: 2    atorvastatin (LIPITOR) 20 MG tablet, TAKE 1 TABLET EVERY EVENING, Disp: 90 tablet, Rfl: 2    atorvastatin (LIPITOR) 80 MG tablet, Take 1 tablet (80 mg total) by mouth once daily., Disp: 90 tablet, Rfl: 3    carvediloL (COREG) 12.5 MG tablet, Take 1 tablet (12.5 mg total) by mouth 2 (two) times daily with meals., Disp: 180 tablet, Rfl: 3    diclofenac (VOLTAREN) 75 MG EC tablet, Take 75 mg by mouth 2 (two) times daily., Disp: , Rfl:     ergocalciferol (ERGOCALCIFEROL) 50,000 unit Cap, Take 1 capsule by mouth weekly for 12 weeks then take 1 capsule monthly after that, Disp: 12 capsule, Rfl: 2    esomeprazole (NEXIUM) 20 MG capsule, TAKE 1 CAPSULE BEFORE      BREAKFAST, Disp: 90 capsule, Rfl: 0    gabapentin (NEURONTIN) 100 MG capsule, Take 1 capsule (100 mg total) by mouth 3 (three) times daily., Disp: 90 capsule, Rfl: 5    mineral oil-hydrophil petrolat (AQUAPHOR) Oint, Apply topically as needed (dry skin)., Disp: 396 g, Rfl: 1    omega 3-dha-epa-fish oil (FISH OIL) 1,000 mg (120 mg-180 mg) Cap, Take 1 capsule by mouth once daily., Disp: 90 capsule, Rfl: 3    omega-3 acid ethyl esters (LOVAZA) 1 gram capsule, Take 1 capsule by mouth.,  "Disp: , Rfl:     potassium chloride SA (K-DUR,KLOR-CON) 20 MEQ tablet, Take 1 tablet (20 mEq total) by mouth 2 (two) times daily., Disp: 180 tablet, Rfl: 2    tadalafiL (CIALIS) 5 MG tablet, Take one tablet prn before sexual activity; effects may last 36 hours. Max dose is 20 mg/dose up to 1 dose/24 hours., Disp: 10 tablet, Rfl: 1    Review of Systems   Respiratory:  Negative for cough and shortness of breath.    Cardiovascular:  Positive for leg swelling. Negative for chest pain, palpitations, orthopnea, claudication and PND.         Objective:   BP (!) 140/100   Pulse (!) 112   Resp 16   Ht 5' 8" (1.727 m)   Wt (!) 136.5 kg (301 lb)   BMI 45.77 kg/m²     Physical Exam  Vitals and nursing note reviewed.   Constitutional:       Appearance: Normal appearance.   Cardiovascular:      Rate and Rhythm: Regular rhythm. Tachycardia present.      Pulses: Normal pulses.      Heart sounds: Normal heart sounds.   Pulmonary:      Breath sounds: Normal breath sounds.   Musculoskeletal:      Right lower leg: Edema present.      Left lower leg: Edema present.   Neurological:      Mental Status: He is alert and oriented to person, place, and time.           Assessment:     1. Heart failure with mid-range ejection fraction (HFmEF)  NM Myocardial Perfusion Spect Multi Exer    Nuclear Stress Test      2. Alcohol cessation counseling        3. Tachycardia              Plan:   Alcohol cessation counseling  Reports cutting back  Elevated AST/ALT  Tachycardic  - ETOH use - education cessation given    Heart failure with mid-range ejection fraction (HFmEF)  EF 45-50% - treadmill nuclear ordered  Euvolemic  Tachycardic  Increase coreg to 12.5mg bid for tachycardia    Tachycardia  Sinus tachy  Likely etoh related  Increase coreg to 12.5mg bid              "

## 2024-09-11 NOTE — ASSESSMENT & PLAN NOTE
EF 45-50% - treadmill nuclear ordered  Euvolemic  Tachycardic  Increase coreg to 12.5mg bid for tachycardia

## 2024-09-16 LAB
OHS QRS DURATION: 90 MS
OHS QTC CALCULATION: 475 MS

## 2024-10-23 DIAGNOSIS — K21.9 GASTROESOPHAGEAL REFLUX DISEASE, UNSPECIFIED WHETHER ESOPHAGITIS PRESENT: ICD-10-CM

## 2024-10-28 RX ORDER — HYDROGEN PEROXIDE 3 %
20 SOLUTION, NON-ORAL MISCELLANEOUS
Qty: 90 CAPSULE | Refills: 0 | Status: SHIPPED | OUTPATIENT
Start: 2024-10-28

## 2024-11-04 ENCOUNTER — OFFICE VISIT (OUTPATIENT)
Dept: GASTROENTEROLOGY | Facility: CLINIC | Age: 54
End: 2024-11-04
Payer: COMMERCIAL

## 2024-11-04 VITALS
HEIGHT: 68 IN | HEART RATE: 104 BPM | OXYGEN SATURATION: 97 % | BODY MASS INDEX: 46.17 KG/M2 | WEIGHT: 304.63 LBS | SYSTOLIC BLOOD PRESSURE: 151 MMHG | DIASTOLIC BLOOD PRESSURE: 99 MMHG

## 2024-11-04 DIAGNOSIS — K76.0 HEPATIC STEATOSIS: Primary | ICD-10-CM

## 2024-11-04 PROCEDURE — 99214 OFFICE O/P EST MOD 30 MIN: CPT | Mod: PBBFAC

## 2024-11-04 PROCEDURE — 3077F SYST BP >= 140 MM HG: CPT | Mod: ,,,

## 2024-11-04 PROCEDURE — 3008F BODY MASS INDEX DOCD: CPT | Mod: ,,,

## 2024-11-04 PROCEDURE — 1159F MED LIST DOCD IN RCRD: CPT | Mod: ,,,

## 2024-11-04 PROCEDURE — 1160F RVW MEDS BY RX/DR IN RCRD: CPT | Mod: ,,,

## 2024-11-04 PROCEDURE — 3066F NEPHROPATHY DOC TX: CPT | Mod: ,,,

## 2024-11-04 PROCEDURE — 99999 PR PBB SHADOW E&M-EST. PATIENT-LVL IV: CPT | Mod: PBBFAC,,,

## 2024-11-04 PROCEDURE — 99214 OFFICE O/P EST MOD 30 MIN: CPT | Mod: S$PBB,,,

## 2024-11-04 PROCEDURE — 3061F NEG MICROALBUMINURIA REV: CPT | Mod: ,,,

## 2024-11-04 PROCEDURE — 3044F HG A1C LEVEL LT 7.0%: CPT | Mod: ,,,

## 2024-11-04 PROCEDURE — 3080F DIAST BP >= 90 MM HG: CPT | Mod: ,,,

## 2024-11-04 NOTE — PROGRESS NOTES
"  CC:  Hepatic steatosis    HPI 54 y.o. AA male presents today for follow-up on elevated liver enzymes and hepatic steatosis.  Patient states he has cut back on drinking a good amount.  Patient states he is trying to stop drinking altogether.  We did discuss weight loss in eating a more healthy Mediterranean style diet.  Instructed patient that these things along with exercise can help decrease hepatic steatosis progression.  Patient is scheduled for colonoscopy 11/26/24.  Patient denies any abdominal pain, hematochezia or melena.  Patient denies any nausea vomiting or dysphagia  Labs today hemoglobin 13.2 hematocrit 38.8, ALT is 47 and AST is 60.  Interval  HPI 53 y.o. AA male presents today with a blood in his stools and patient has never a colonoscopy.  Patient does have some elevated liver enzymes we will plan to do an ultrasound his liver in labs today patient does not complain of abdominal pain at this time.  Denies any melena, nausea vomiting or dysphagia.  CBC shows hemoglobin of 13.3 and hematocrit 39.4.  We will plan for colonoscopy     Medical records reviewed. Additional history supplemented by nursing.     Past Medical History:   Diagnosis Date    BMI 45.0-49.9, adult 06/20/2018    Cervicalgia 04/07/2021    Diabetes mellitus, type 2     DJD (degenerative joint disease) 03/15/2019    Edema 01/27/2021    Essential hypertension 04/07/2021    GERD (gastroesophageal reflux disease) 01/13/2021    Hyperlipidemia     Hypertension     Low back pain 06/19/2019    Osteoporosis     Pain in right shoulder 04/07/2021    Tachycardia 04/07/2021         Review of Systems  General ROS: negative for chills, fever or weight loss  Cardiovascular ROS: no chest pain or dyspnea on exertion  Gastrointestinal ROS: no abdominal pain, change in bowel habits, or black/ bloody stools    Physical Examination  BP (!) 151/99   Pulse 104   Ht 5' 8" (1.727 m)   Wt (!) 138.2 kg (304 lb 9.6 oz)   SpO2 97%   BMI 46.31 kg/m²   General " appearance: alert, cooperative, no distress  HENT: Normocephalic, atraumatic, neck symmetrical, no nasal discharge   Lungs: no labored breathing, symmetric chest wall expansion bilaterally  Heart: regular rate and rhythm without rub; no displacement of the PMI   Abdomen: soft, non-tender; bowel sounds normoactive; no organomegaly    Labs:  Lab Results   Component Value Date    WBC 6.62 07/01/2024    HGB 13.2 (L) 07/01/2024    HCT 38.8 (L) 07/01/2024    .8 (H) 07/01/2024     07/01/2024       CMP  Sodium   Date Value Ref Range Status   07/01/2024 140 136 - 145 mmol/L Final     Potassium   Date Value Ref Range Status   07/01/2024 3.4 (L) 3.5 - 5.1 mmol/L Final     Chloride   Date Value Ref Range Status   07/01/2024 103 98 - 107 mmol/L Final     CO2   Date Value Ref Range Status   07/01/2024 26 21 - 32 mmol/L Final     Glucose   Date Value Ref Range Status   07/01/2024 94 74 - 106 mg/dL Final     BUN   Date Value Ref Range Status   07/01/2024 15 7 - 18 mg/dL Final     Creatinine   Date Value Ref Range Status   07/01/2024 1.01 0.70 - 1.30 mg/dL Final     Calcium   Date Value Ref Range Status   07/01/2024 8.4 (L) 8.5 - 10.1 mg/dL Final     Total Protein   Date Value Ref Range Status   07/01/2024 7.5 6.4 - 8.2 g/dL Final     Albumin   Date Value Ref Range Status   07/01/2024 3.3 (L) 3.5 - 5.0 g/dL Final     Bilirubin, Total   Date Value Ref Range Status   07/01/2024 0.7 >0.0 - 1.2 mg/dL Final     Alk Phos   Date Value Ref Range Status   07/01/2024 72 45 - 115 U/L Final     AST   Date Value Ref Range Status   07/01/2024 56 (H) 15 - 37 U/L Final     ALT   Date Value Ref Range Status   07/01/2024 42 16 - 61 U/L Final     Anion Gap   Date Value Ref Range Status   07/01/2024 14 7 - 16 mmol/L Final     eGFR    Date Value Ref Range Status   12/06/2021 85 >=60 mL/min/1.73m² Final     eGFR   Date Value Ref Range Status   07/12/2022 90 >=60 mL/min/1.73m² Final       Imaging:  Ultrasound elastography  scheduled    Independently reviewed    Assessment: Papo Bell 55 yo AAM here with:  Hepatic steatosis  Elevated liver enzymes  Morbid Obesity    Plan:  Ultrasound liver elastography  Labs today  Exercise 150 minutes per week  Weight loss of 7-10%. Weight loss should be gradual  Diet low in saturated fats and carbohydrates  Good glucose and cholesterol control  Follow-up in 6 months or sooner    30 minutes of total time spent on the encounter, which includes face to face time and non-face to face time preparing to see the patient (eg, review of tests), obtaining and/or reviewing separately obtained history, documenting clinical information in the electronic or other health record, Independently interpreting results (not separately reported) and communicating results to the patient/family/caregiver, or care coordination (not separately reported).         Mamie Hatfield, FNP  [facility]

## 2024-11-05 ENCOUNTER — TELEPHONE (OUTPATIENT)
Dept: GASTROENTEROLOGY | Facility: CLINIC | Age: 54
End: 2024-11-05
Payer: COMMERCIAL

## 2024-11-05 NOTE — TELEPHONE ENCOUNTER
Several attempts made to contact pt regarding labs results - VM left x2 - spoke w/ spouse whom states will have pt call clinic - awaiting call return

## 2024-11-26 ENCOUNTER — ANESTHESIA (OUTPATIENT)
Dept: GASTROENTEROLOGY | Facility: HOSPITAL | Age: 54
End: 2024-11-26
Payer: COMMERCIAL

## 2024-11-26 ENCOUNTER — HOSPITAL ENCOUNTER (OUTPATIENT)
Dept: GASTROENTEROLOGY | Facility: HOSPITAL | Age: 54
Discharge: HOME | End: 2024-11-26
Admitting: INTERNAL MEDICINE
Payer: COMMERCIAL

## 2024-11-26 ENCOUNTER — ANESTHESIA EVENT (OUTPATIENT)
Dept: GASTROENTEROLOGY | Facility: HOSPITAL | Age: 54
End: 2024-11-26
Payer: COMMERCIAL

## 2024-11-26 VITALS
HEIGHT: 68 IN | WEIGHT: 303 LBS | SYSTOLIC BLOOD PRESSURE: 131 MMHG | RESPIRATION RATE: 24 BRPM | HEART RATE: 103 BPM | OXYGEN SATURATION: 97 % | TEMPERATURE: 97 F | DIASTOLIC BLOOD PRESSURE: 82 MMHG | BODY MASS INDEX: 45.92 KG/M2

## 2024-11-26 DIAGNOSIS — R74.8 ELEVATED LIVER ENZYMES: ICD-10-CM

## 2024-11-26 DIAGNOSIS — K92.1 BLOOD IN STOOL: ICD-10-CM

## 2024-11-26 PROCEDURE — D9220A PRA ANESTHESIA: Mod: ,,,

## 2024-11-26 PROCEDURE — 37000009 HC ANESTHESIA EA ADD 15 MINS

## 2024-11-26 PROCEDURE — 63600175 PHARM REV CODE 636 W HCPCS

## 2024-11-26 PROCEDURE — 27000284 HC CANNULA NASAL

## 2024-11-26 PROCEDURE — 37000008 HC ANESTHESIA 1ST 15 MINUTES

## 2024-11-26 RX ORDER — SODIUM CHLORIDE, SODIUM LACTATE, POTASSIUM CHLORIDE, CALCIUM CHLORIDE 600; 310; 30; 20 MG/100ML; MG/100ML; MG/100ML; MG/100ML
INJECTION, SOLUTION INTRAVENOUS CONTINUOUS
Status: DISCONTINUED | OUTPATIENT
Start: 2024-11-26 | End: 2024-11-27 | Stop reason: HOSPADM

## 2024-11-26 RX ORDER — PROPOFOL 10 MG/ML
VIAL (ML) INTRAVENOUS
Status: DISCONTINUED | OUTPATIENT
Start: 2024-11-26 | End: 2024-11-26

## 2024-11-26 RX ORDER — SODIUM CHLORIDE 0.9 % (FLUSH) 0.9 %
10 SYRINGE (ML) INJECTION EVERY 6 HOURS PRN
Status: DISCONTINUED | OUTPATIENT
Start: 2024-11-26 | End: 2024-11-27 | Stop reason: HOSPADM

## 2024-11-26 RX ORDER — LIDOCAINE HYDROCHLORIDE 20 MG/ML
INJECTION, SOLUTION EPIDURAL; INFILTRATION; INTRACAUDAL; PERINEURAL
Status: DISCONTINUED | OUTPATIENT
Start: 2024-11-26 | End: 2024-11-26

## 2024-11-26 RX ADMIN — PROPOFOL 50 MG: 10 INJECTION, EMULSION INTRAVENOUS at 01:11

## 2024-11-26 RX ADMIN — PROPOFOL 100 MG: 10 INJECTION, EMULSION INTRAVENOUS at 01:11

## 2024-11-26 RX ADMIN — LIDOCAINE HYDROCHLORIDE 100 MG: 20 INJECTION, SOLUTION INTRAVENOUS at 01:11

## 2024-11-26 NOTE — DISCHARGE INSTRUCTIONS
Procedure Date  11/26/24     Impression  Overall Impression:   The terminal ileum appeared normal.  Lipoma in the ascending colon  Diverticulosis in the ascending colon, descending colon and sigmoid colon  Hemorrhoids        Recommendation  Repeat screening colonoscopy in 10 years         - Rectal bleeding is likely related to large internal hemorrhoids  - Discharge patient to home  - Advance diet as tolerated  - Continue present medications  - Patient has a contact number available for emergencies. The signs and symptoms of potential delayed complications were discussed with the patient. Return to normal activities tomorrow. Written discharge instructions were provided to the patient.    No driving today, no operating heavy machinery, no signing any legal documents until tomorrow.    Drink lots of fluids, resume regular diet.  Take your normal medications.     Please call our office for any nausea, vomiting or abdominal pain.

## 2024-11-26 NOTE — ANESTHESIA POSTPROCEDURE EVALUATION
Anesthesia Post Evaluation    Patient: Papo Bell    Procedure(s) Performed: * Colonoscopy *    Final Anesthesia Type: MAC      Patient location during evaluation: GI PACU  Patient participation: Yes- Able to Participate  Level of consciousness: awake and alert  Post-procedure vital signs: reviewed and stable  Pain management: adequate  Airway patency: patent    PONV status at discharge: No PONV  Anesthetic complications: no      Cardiovascular status: blood pressure returned to baseline and hemodynamically stable  Respiratory status: unassisted, spontaneous ventilation and room air  Hydration status: euvolemic  Follow-up not needed.  Comments: Refer to nursing notes for pain/anton score upon discharge from recovery.              Vitals Value Taken Time   /77 11/26/24 1349   Temp 36.1 °C (97 °F) 11/26/24 1320   Pulse 103 11/26/24 1350   Resp 17 11/26/24 1350   SpO2 97 % 11/26/24 1350   Vitals shown include unfiled device data.      No case tracking events are documented in the log.      Pain/Anton Score: Anton Score: 9 (11/26/2024  1:26 PM)

## 2024-11-26 NOTE — TRANSFER OF CARE
"Anesthesia Transfer of Care Note    Patient: Papo Bell    Procedure(s) Performed: * Colonoscopy *    Patient location: GI    Anesthesia Type: MAC    Transport from OR: Transported from OR on room air with adequate spontaneous ventilation. Continuous ECG monitoring in transport. Continuous SpO2 monitoring in transport    Post pain: adequate analgesia    Post assessment: no apparent anesthetic complications    Post vital signs: stable    Level of consciousness: alert and awake    Nausea/Vomiting: no nausea/vomiting    Complications: none    Transfer of care protocol was followedComments: Good SV continue, NAD, VSS, RTRN      Last vitals: Visit Vitals  BP (!) 85/53   Pulse (!) 111   Temp 36.1 °C (97 °F) (Skin)   Resp (!) 41   Ht 5' 8" (1.727 m)   Wt (!) 137.4 kg (303 lb)   SpO2 100%   BMI 46.07 kg/m²     "

## 2024-11-26 NOTE — ANESTHESIA PREPROCEDURE EVALUATION
11/26/2024  Papo Bell is a 54 y.o., male.  Current Outpatient Medications on File Prior to Encounter   Medication Sig Dispense Refill    amLODIPine (NORVASC) 10 MG tablet TAKE 1 TABLET ONCE DAILY 90 tablet 2    aspirin (ECOTRIN) 81 MG EC tablet Take 1 tablet (81 mg total) by mouth 2 (two) times daily. 90 tablet 2    atorvastatin (LIPITOR) 20 MG tablet TAKE 1 TABLET EVERY EVENING 90 tablet 2    atorvastatin (LIPITOR) 80 MG tablet Take 1 tablet (80 mg total) by mouth once daily. 90 tablet 3    carvediloL (COREG) 12.5 MG tablet Take 1 tablet (12.5 mg total) by mouth 2 (two) times daily with meals. 180 tablet 3    diclofenac (VOLTAREN) 75 MG EC tablet Take 75 mg by mouth 2 (two) times daily.      ergocalciferol (ERGOCALCIFEROL) 50,000 unit Cap Take 1 capsule by mouth weekly for 12 weeks then take 1 capsule monthly after that 12 capsule 2    esomeprazole (NEXIUM) 20 MG capsule TAKE 1 CAPSULE BEFORE      BREAKFAST 90 capsule 0    gabapentin (NEURONTIN) 100 MG capsule Take 1 capsule (100 mg total) by mouth 3 (three) times daily. 90 capsule 5    mineral oil-hydrophil petrolat (AQUAPHOR) Oint Apply topically as needed (dry skin). 396 g 1    omega 3-dha-epa-fish oil (FISH OIL) 1,000 mg (120 mg-180 mg) Cap Take 1 capsule by mouth once daily. 90 capsule 3    omega-3 acid ethyl esters (LOVAZA) 1 gram capsule Take 1 capsule by mouth.      potassium chloride SA (K-DUR,KLOR-CON) 20 MEQ tablet Take 1 tablet (20 mEq total) by mouth 2 (two) times daily. 180 tablet 2    tadalafiL (CIALIS) 5 MG tablet Take one tablet prn before sexual activity; effects may last 36 hours. Max dose is 20 mg/dose up to 1 dose/24 hours. 10 tablet 1     No current facility-administered medications on file prior to encounter.     Active Ambulatory Problems     Diagnosis Date Noted    Leg swelling 07/21/2021    Other skin changes 08/16/2021     Edema, lower extremity 08/16/2021    Leg pain, bilateral 08/16/2021    Numbness of fingers 09/14/2021    Vitamin B12 deficiency 09/14/2021    Vitamin D deficiency 09/14/2021    Neuropathy of hand, left 09/14/2021    Carpal tunnel syndrome on both sides 12/09/2021    Ulnar nerve compression, left 12/09/2021    Ulnar nerve compression, right 12/09/2021    Venous insufficiency 01/26/2022    Hyperpigmentation of skin 01/26/2022    Secondary lymphedema 07/24/2023    Benign essential HTN 06/11/2024    Alcohol cessation counseling 06/11/2024    Heart failure with mid-range ejection fraction (HFmEF) 09/11/2024    Tachycardia 04/07/2021    Hepatic steatosis 11/04/2024     Resolved Ambulatory Problems     Diagnosis Date Noted    Right knee pain 10/24/2022     Past Medical History:   Diagnosis Date    BMI 45.0-49.9, adult 06/20/2018    Cervicalgia 04/07/2021    Diabetes mellitus, type 2     DJD (degenerative joint disease) 03/15/2019    Edema 01/27/2021    Essential hypertension 04/07/2021    GERD (gastroesophageal reflux disease) 01/13/2021    Hyperlipidemia     Hypertension     Low back pain 06/19/2019    Osteoporosis     Pain in right shoulder 04/07/2021     Past Surgical History:   Procedure Laterality Date    CARPAL TUNNEL RELEASE Left 2/17/2022    Procedure: RELEASE, CARPAL TUNNEL;  Surgeon: Wilber Crocker MD;  Location: [facility] OR;  Service: Orthopedics;  Laterality: Left;    PATELLA TENDONS REPAIR Bilateral     RADIOFREQUENCY ABLATION Left 2/11/2022    Procedure: Left GSV RF Ablation;  Surgeon: Collins Canada DO;  Location: [facility] OR;  Service: Vascular;  Laterality: Left;    TORN MENISCUS Left     ULNAR NERVE TRANSPOSITION Left 2/17/2022    Procedure: TRANSPOSITION, NERVE, ULNAR;  Surgeon: Wilber Crocker MD;  Location: [facility] OR;  Service: Orthopedics;  Laterality: Left;    WRIST FRACTURE SURGERY Left          Pre-op Assessment    I have reviewed the Patient Summary Reports.     I have reviewed  the Nursing Notes. I have reviewed the NPO Status.   I have reviewed the Medications.     Review of Systems  Anesthesia Hx:  No problems with previous Anesthesia             Denies Family Hx of Anesthesia complications.    Denies Personal Hx of Anesthesia complications.                    Social:  Non-Smoker, Alcohol Use       Hematology/Oncology:  Hematology Normal   Oncology Normal                                   EENT/Dental:  EENT/Dental Normal           Cardiovascular:     Hypertension       CHF    hyperlipidemia    Venous insufficiency                           Pulmonary:  Pulmonary Normal                       Renal/:  Renal/ Normal                 Hepatic/GI:  Bowel Prep.   GERD Liver Disease,               Musculoskeletal:  Arthritis               Neurological:    Neuromuscular Disease,                                   Endocrine:  Diabetes, type 2         Morbid Obesity / BMI > 40  Dermatological:  Skin Normal    Psych:  Psychiatric Normal                    Chemistry        Component Value Date/Time     11/04/2024 1359    K 3.9 11/04/2024 1359     11/04/2024 1359    CO2 26 11/04/2024 1359    BUN 22 (H) 11/04/2024 1359    CREATININE 1.21 11/04/2024 1359    GLU 97 11/04/2024 1359        Component Value Date/Time    CALCIUM 8.8 11/04/2024 1359    ALKPHOS 72 11/04/2024 1359    AST 60 (H) 11/04/2024 1359    ALT 47 11/04/2024 1359    BILITOT 0.8 11/04/2024 1359    ESTGFRAFRICA 85 12/06/2021 0853    EGFRNONAA 90 07/12/2022 0945        Lab Results   Component Value Date    WBC 6.67 11/04/2024    HGB 13.2 (L) 11/04/2024    HCT 38.8 (L) 11/04/2024    .7 (H) 11/04/2024     (L) 11/04/2024       Results for orders placed or performed in visit on 09/11/24   EKG 12-lead    Collection Time: 09/11/24 11:13 AM   Result Value Ref Range    QRS Duration 90 ms    OHS QTC Calculation 475 ms    Narrative    Test Reason : I10,    Vent. Rate : 112 BPM     Atrial Rate : 112 BPM     P-R Int : 170 ms           QRS Dur : 090 ms      QT Int : 348 ms       P-R-T Axes : 031 061 -02 degrees     QTc Int : 475 ms    Sinus tachycardia with Premature atrial complexes  Abnormal QRS-T angle, consider primary T wave abnormality  Abnormal ECG  When compared with ECG of 11-JUN-2024 08:16,  Premature ventricular complexes are no longer Present  Premature atrial complexes are now Present  Left posterior fascicular block is no longer Present  Confirmed by Sebastian ALEMAN, Saman RAMEY ([id number]) on 9/16/2024 8:28:51 AM    Referred By:             Confirmed By:Saman Cortes MD     Results for orders placed during the hospital encounter of 07/01/24    Echo    Interpretation Summary    Left Ventricle: Left ventricle was not well visualized due to poor sonic window. The left ventricle is normal in size. Increased wall thickness. Septal thickening. Mild global hypokinesis present. There is mildly reduced systolic function with a visually estimated ejection fraction of 45 - 50%. There is diastolic dysfunction.    Right Ventricle: Normal right ventricular cavity size. Systolic function is normal.    Aortic Valve: The aortic valve is a trileaflet valve.    Mitral Valve: There is mild regurgitation.    Tricuspid Valve: There is mild to moderate regurgitation.      Physical Exam  General: Well nourished, Cooperative, Alert and Oriented    Airway:  Mallampati: III   Mouth Opening: Small, but > 3cm  TM Distance: Normal  Tongue: Normal  Neck ROM: Normal ROM    Dental:  Intact    Chest/Lungs:  Normal Respiratory Rate        Anesthesia Plan  Type of Anesthesia, risks & benefits discussed:    Anesthesia Type: MAC  Intra-op Monitoring Plan: Standard ASA Monitors  Post Op Pain Control Plan: multimodal analgesia  Induction:  IV  Informed Consent: Informed consent signed with the Patient and all parties understand the risks and agree with anesthesia plan.  All questions answered. Patient consented to blood products? Yes  ASA Score: 3  Day of Surgery Review  of History & Physical: H&P Update referred to the surgeon/provider.I have interviewed and examined the patient. I have reviewed the patient's H&P dated: There are no significant changes.     Ready For Surgery From Anesthesia Perspective.     .

## 2024-11-26 NOTE — H&P
History & Physical - Short Stay  Gastroenterology      SUBJECTIVE:     Procedure: Colonoscopy    Chief Complaint/Indication for Procedure: Rectal Bleeding    (Not in a hospital admission)      Review of patient's allergies indicates:  No Known Allergies     Past Medical History:   Diagnosis Date    BMI 45.0-49.9, adult 06/20/2018    Cervicalgia 04/07/2021    DJD (degenerative joint disease) 03/15/2019    Edema 01/27/2021    Essential hypertension 04/07/2021    GERD (gastroesophageal reflux disease) 01/13/2021    Hyperlipidemia     Hypertension     Low back pain 06/19/2019    Osteoporosis     Pain in right shoulder 04/07/2021    Tachycardia 04/07/2021     Past Surgical History:   Procedure Laterality Date    CARPAL TUNNEL RELEASE Left 2/17/2022    Procedure: RELEASE, CARPAL TUNNEL;  Surgeon: Wilber Crocker MD;  Location: [facility] OR;  Service: Orthopedics;  Laterality: Left;    PATELLA TENDONS REPAIR Bilateral     RADIOFREQUENCY ABLATION Left 2/11/2022    Procedure: Left GSV RF Ablation;  Surgeon: Collins Canada DO;  Location: [facility] OR;  Service: Vascular;  Laterality: Left;    TORN MENISCUS Left     ULNAR NERVE TRANSPOSITION Left 2/17/2022    Procedure: TRANSPOSITION, NERVE, ULNAR;  Surgeon: Wilber Crocker MD;  Location: [facility] OR;  Service: Orthopedics;  Laterality: Left;    WRIST FRACTURE SURGERY Left      Family History   Problem Relation Name Age of Onset    Diabetes Mother          AGE 78    Diabetes Father          AGE 54    Hypertension Father      No Known Problems Brother          AGE 55    No Known Problems Brother          AGE 49    Hypertension Brother          AGE 47    No Known Problems Daughter      No Known Problems Daughter      No Known Problems Daughter      No Known Problems Son      No Known Problems Son       Social History     Tobacco Use    Smoking status: Never    Smokeless tobacco: Never   Substance Use Topics    Alcohol use: Yes    Drug use: Never         OBJECTIVE:      Vital Signs (Most Recent)  Temp: 97.9 °F (36.6 °C) (11/26/24 1116)  Pulse: 109 (11/26/24 1107)  Resp: (!) 21 (11/26/24 1107)  BP: 130/72 (11/26/24 1107)  SpO2: 100 % (11/26/24 1107)    Physical Exam:                                                       GENERAL:  Comfortable, in no acute distress.                                 HEENT EXAM:  Nonicteric.  No adenopathy.  Oropharynx is clear.               NECK:  Supple.                                                               LUNGS:  Clear.                                                               CARDIAC:  Regular rate and rhythm.  S1, S2.  No murmur.                      ABDOMEN:  Soft, positive bowel sounds, nontender.  No hepatosplenomegaly or masses.  No rebound or guarding.                                             EXTREMITIES:  No edema.     MENTAL STATUS:  Normal, alert and oriented.      ASSESSMENT/PLAN:     Assessment: Rectal Bleeding    Plan: Colonoscopy   Xeljanz Counseling: I discussed with the patient the risks of Xeljanz therapy including increased risk of infection, liver issues, headache, diarrhea, or cold symptoms. Live vaccines should be avoided. They were instructed to call if they have any problems.

## 2024-12-03 ENCOUNTER — HOSPITAL ENCOUNTER (OUTPATIENT)
Dept: RADIOLOGY | Facility: HOSPITAL | Age: 54
Discharge: HOME | End: 2024-12-03
Payer: COMMERCIAL

## 2024-12-03 DIAGNOSIS — K76.0 HEPATIC STEATOSIS: ICD-10-CM

## 2024-12-03 PROCEDURE — 76981 USE PARENCHYMA: CPT | Mod: TC

## 2024-12-03 PROCEDURE — 76981 USE PARENCHYMA: CPT | Mod: 26,,, | Performed by: RADIOLOGY

## 2024-12-05 NOTE — PROGRESS NOTES
Metavir score of F0 normal liver fibrosis staging.  Exercise 150 minutes per week  Weight loss of 7-10% body weight  Diet low is saturated fats and carbohydrates  Good glucose and cholesterol control

## 2024-12-09 ENCOUNTER — OFFICE VISIT (OUTPATIENT)
Dept: PRIMARY CARE CLINIC | Facility: CLINIC | Age: 54
End: 2024-12-09
Payer: COMMERCIAL

## 2024-12-09 VITALS
WEIGHT: 306 LBS | SYSTOLIC BLOOD PRESSURE: 126 MMHG | HEART RATE: 102 BPM | DIASTOLIC BLOOD PRESSURE: 87 MMHG | OXYGEN SATURATION: 99 % | HEIGHT: 68 IN | TEMPERATURE: 99 F | RESPIRATION RATE: 20 BRPM | BODY MASS INDEX: 46.38 KG/M2

## 2024-12-09 DIAGNOSIS — E87.6 HYPOKALEMIA: ICD-10-CM

## 2024-12-09 DIAGNOSIS — K21.9 GASTROESOPHAGEAL REFLUX DISEASE, UNSPECIFIED WHETHER ESOPHAGITIS PRESENT: ICD-10-CM

## 2024-12-09 DIAGNOSIS — G56.03 CARPAL TUNNEL SYNDROME ON BOTH SIDES: ICD-10-CM

## 2024-12-09 DIAGNOSIS — I10 PRIMARY HYPERTENSION: ICD-10-CM

## 2024-12-09 DIAGNOSIS — I10 ESSENTIAL HYPERTENSION: Primary | ICD-10-CM

## 2024-12-09 DIAGNOSIS — G56.21 ULNAR NERVE COMPRESSION, RIGHT: ICD-10-CM

## 2024-12-09 DIAGNOSIS — E11.9 TYPE 2 DIABETES MELLITUS WITHOUT COMPLICATION, WITHOUT LONG-TERM CURRENT USE OF INSULIN: ICD-10-CM

## 2024-12-09 DIAGNOSIS — N52.9 ERECTILE DYSFUNCTION, UNSPECIFIED ERECTILE DYSFUNCTION TYPE: ICD-10-CM

## 2024-12-09 DIAGNOSIS — G56.22 ULNAR NERVE COMPRESSION, LEFT: ICD-10-CM

## 2024-12-09 DIAGNOSIS — E78.49 OTHER HYPERLIPIDEMIA: ICD-10-CM

## 2024-12-09 DIAGNOSIS — M25.571 ACUTE RIGHT ANKLE PAIN: ICD-10-CM

## 2024-12-09 LAB
ALBUMIN SERPL BCP-MCNC: 3 G/DL (ref 3.5–5)
ALBUMIN/GLOB SERPL: 0.9 {RATIO}
ALP SERPL-CCNC: 60 U/L (ref 40–150)
ALT SERPL W P-5'-P-CCNC: 19 U/L
ANION GAP SERPL CALCULATED.3IONS-SCNC: 12 MMOL/L (ref 7–16)
AST SERPL W P-5'-P-CCNC: 57 U/L (ref 5–34)
BASOPHILS # BLD AUTO: 0.03 K/UL (ref 0–0.2)
BASOPHILS NFR BLD AUTO: 0.5 % (ref 0–1)
BILIRUB SERPL-MCNC: 0.9 MG/DL
BUN SERPL-MCNC: 12 MG/DL (ref 8–26)
BUN/CREAT SERPL: 15 (ref 6–20)
CALCIUM SERPL-MCNC: 7.2 MG/DL (ref 8.4–10.2)
CHLORIDE SERPL-SCNC: 108 MMOL/L (ref 98–107)
CHOLEST SERPL-MCNC: 149 MG/DL
CHOLEST/HDLC SERPL: 2.3 {RATIO}
CO2 SERPL-SCNC: 23 MMOL/L (ref 22–29)
CREAT SERPL-MCNC: 0.79 MG/DL (ref 0.72–1.25)
DIFFERENTIAL METHOD BLD: ABNORMAL
EGFR (NO RACE VARIABLE) (RUSH/TITUS): 104 ML/MIN/1.73M2
EOSINOPHIL # BLD AUTO: 0.03 K/UL (ref 0–0.5)
EOSINOPHIL NFR BLD AUTO: 0.5 % (ref 1–4)
ERYTHROCYTE [DISTWIDTH] IN BLOOD BY AUTOMATED COUNT: 14.1 % (ref 11.5–14.5)
EST. AVERAGE GLUCOSE BLD GHB EST-MCNC: 100 MG/DL
GLOBULIN SER-MCNC: 3.5 G/DL (ref 2–4)
GLUCOSE SERPL-MCNC: 105 MG/DL (ref 70–110)
GLUCOSE SERPL-MCNC: 99 MG/DL (ref 74–100)
HBA1C MFR BLD HPLC: 5.1 %
HCT VFR BLD AUTO: 36.5 % (ref 40–54)
HDLC SERPL-MCNC: 65 MG/DL (ref 35–60)
HGB BLD-MCNC: 11.8 G/DL (ref 13.5–18)
IMM GRANULOCYTES # BLD AUTO: 0.05 K/UL (ref 0–0.04)
IMM GRANULOCYTES NFR BLD: 0.8 % (ref 0–0.4)
LDLC SERPL CALC-MCNC: 5 MG/DL
LYMPHOCYTES # BLD AUTO: 1.59 K/UL (ref 1–4.8)
LYMPHOCYTES NFR BLD AUTO: 26.1 % (ref 27–41)
MACROCYTES BLD QL SMEAR: NORMAL
MCH RBC QN AUTO: 35.6 PG (ref 27–31)
MCHC RBC AUTO-ENTMCNC: 32.3 G/DL (ref 32–36)
MCV RBC AUTO: 110.3 FL (ref 80–96)
MONOCYTES # BLD AUTO: 0.44 K/UL (ref 0–0.8)
MONOCYTES NFR BLD AUTO: 7.2 % (ref 2–6)
MPC BLD CALC-MCNC: 10.5 FL (ref 9.4–12.4)
NEUTROPHILS # BLD AUTO: 3.95 K/UL (ref 1.8–7.7)
NEUTROPHILS NFR BLD AUTO: 64.9 % (ref 53–65)
NONHDLC SERPL-MCNC: 84 MG/DL
NRBC # BLD AUTO: 0 X10E3/UL
NRBC, AUTO (.00): 0 %
PLATELET # BLD AUTO: 150 K/UL (ref 150–400)
PLATELET MORPHOLOGY: NORMAL
POTASSIUM SERPL-SCNC: 3.1 MMOL/L (ref 3.5–5.1)
PROT SERPL-MCNC: 6.5 G/DL (ref 6.4–8.3)
RBC # BLD AUTO: 3.31 M/UL (ref 4.6–6.2)
SODIUM SERPL-SCNC: 140 MMOL/L (ref 136–145)
TRIGL SERPL-MCNC: 397 MG/DL (ref 34–140)
URATE SERPL-MCNC: 7.7 MG/DL (ref 3.5–7.2)
VLDLC SERPL-MCNC: 79 MG/DL
WBC # BLD AUTO: 6.09 K/UL (ref 4.5–11)

## 2024-12-09 PROCEDURE — 3074F SYST BP LT 130 MM HG: CPT | Mod: CPTII,,, | Performed by: NURSE PRACTITIONER

## 2024-12-09 PROCEDURE — 84550 ASSAY OF BLOOD/URIC ACID: CPT | Mod: ,,, | Performed by: CLINICAL MEDICAL LABORATORY

## 2024-12-09 PROCEDURE — 96372 THER/PROPH/DIAG INJ SC/IM: CPT | Mod: ,,, | Performed by: NURSE PRACTITIONER

## 2024-12-09 PROCEDURE — 3044F HG A1C LEVEL LT 7.0%: CPT | Mod: CPTII,,, | Performed by: NURSE PRACTITIONER

## 2024-12-09 PROCEDURE — 3066F NEPHROPATHY DOC TX: CPT | Mod: CPTII,,, | Performed by: NURSE PRACTITIONER

## 2024-12-09 PROCEDURE — 85025 COMPLETE CBC W/AUTO DIFF WBC: CPT | Mod: ,,, | Performed by: CLINICAL MEDICAL LABORATORY

## 2024-12-09 PROCEDURE — 1159F MED LIST DOCD IN RCRD: CPT | Mod: CPTII,,, | Performed by: NURSE PRACTITIONER

## 2024-12-09 PROCEDURE — 80061 LIPID PANEL: CPT | Mod: ,,, | Performed by: CLINICAL MEDICAL LABORATORY

## 2024-12-09 PROCEDURE — 3008F BODY MASS INDEX DOCD: CPT | Mod: CPTII,,, | Performed by: NURSE PRACTITIONER

## 2024-12-09 PROCEDURE — 3061F NEG MICROALBUMINURIA REV: CPT | Mod: CPTII,,, | Performed by: NURSE PRACTITIONER

## 2024-12-09 PROCEDURE — 99214 OFFICE O/P EST MOD 30 MIN: CPT | Mod: 25,,, | Performed by: NURSE PRACTITIONER

## 2024-12-09 PROCEDURE — 3079F DIAST BP 80-89 MM HG: CPT | Mod: CPTII,,, | Performed by: NURSE PRACTITIONER

## 2024-12-09 PROCEDURE — 83036 HEMOGLOBIN GLYCOSYLATED A1C: CPT | Mod: ,,, | Performed by: CLINICAL MEDICAL LABORATORY

## 2024-12-09 PROCEDURE — 1160F RVW MEDS BY RX/DR IN RCRD: CPT | Mod: CPTII,,, | Performed by: NURSE PRACTITIONER

## 2024-12-09 PROCEDURE — 80053 COMPREHEN METABOLIC PANEL: CPT | Mod: ,,, | Performed by: CLINICAL MEDICAL LABORATORY

## 2024-12-09 RX ORDER — HYDROGEN PEROXIDE 3 %
20 SOLUTION, NON-ORAL MISCELLANEOUS
Qty: 90 CAPSULE | Refills: 2 | Status: SHIPPED | OUTPATIENT
Start: 2024-12-09

## 2024-12-09 RX ORDER — TADALAFIL 5 MG/1
TABLET ORAL
Qty: 10 TABLET | Refills: 1 | Status: SHIPPED | OUTPATIENT
Start: 2024-12-09

## 2024-12-09 RX ORDER — KETOROLAC TROMETHAMINE 30 MG/ML
30 INJECTION, SOLUTION INTRAMUSCULAR; INTRAVENOUS
Status: COMPLETED | OUTPATIENT
Start: 2024-12-09 | End: 2024-12-09

## 2024-12-09 RX ORDER — POTASSIUM CHLORIDE 20 MEQ/1
20 TABLET, EXTENDED RELEASE ORAL 2 TIMES DAILY
Qty: 180 TABLET | Refills: 2 | Status: SHIPPED | OUTPATIENT
Start: 2024-12-09 | End: 2025-09-05

## 2024-12-09 RX ORDER — GABAPENTIN 100 MG/1
100 CAPSULE ORAL 3 TIMES DAILY
Qty: 90 CAPSULE | Refills: 3 | Status: SHIPPED | OUTPATIENT
Start: 2024-12-09 | End: 2025-12-09

## 2024-12-09 RX ORDER — KETOROLAC TROMETHAMINE 30 MG/ML
60 INJECTION, SOLUTION INTRAMUSCULAR; INTRAVENOUS
Status: DISCONTINUED | OUTPATIENT
Start: 2024-12-09 | End: 2024-12-09

## 2024-12-09 RX ORDER — ASPIRIN 81 MG/1
81 TABLET ORAL 2 TIMES DAILY
Qty: 90 TABLET | Refills: 2 | Status: SHIPPED | OUTPATIENT
Start: 2024-12-09

## 2024-12-09 RX ADMIN — KETOROLAC TROMETHAMINE 30 MG: 30 INJECTION, SOLUTION INTRAMUSCULAR; INTRAVENOUS at 11:12

## 2024-12-09 NOTE — PROGRESS NOTES
[facility]  [facility] [place] [address]  Ph: [phone] Huey Mtz DNP, FNP-C  [facility]  Primary Care       PATIENT NAME: Papo Bell   : 1970    AGE: 54 y.o. DATE: 2024    MRN: [id number]        Reason for Visit / Chief Complaint:  Hypertension, Hyperlipidemia, and Gout (Rt foot ankle)     Subjective:     HPI: Patient here for routine follow up exam, labs. Has history of Type 2 diabetes, HTN; patient states he thinks he might have gout; states he had swelling to left great toe a while back and states he now has right ankle pain. Denies any injury to right ankle. Patient states he has not taken any medication today for pain.     Patient not currently taking any medication for diabetes.    Patient has chronic swelling to lower extremities.            Review of Systems: Review of Systems   Constitutional:  Negative for fever.   Respiratory:  Negative for cough and shortness of breath.    Cardiovascular:  Positive for leg swelling. Negative for chest pain.   Genitourinary:  Negative for dysuria.   Musculoskeletal:  Positive for arthralgias. Negative for gait problem.   Skin:  Negative for rash.   Neurological:  Negative for headaches.          Review of patient's allergies indicates:  No Known Allergies     Med List:  Current Outpatient Medications on File Prior to Visit   Medication Sig Dispense Refill    amLODIPine (NORVASC) 10 MG tablet TAKE 1 TABLET ONCE DAILY 90 tablet 2    atorvastatin (LIPITOR) 20 MG tablet TAKE 1 TABLET EVERY EVENING 90 tablet 2    atorvastatin (LIPITOR) 80 MG tablet Take 1 tablet (80 mg total) by mouth once daily. 90 tablet 3    carvediloL (COREG) 12.5 MG tablet Take 1 tablet (12.5 mg total) by mouth 2 (two) times daily with meals. 180 tablet 3    diclofenac (VOLTAREN) 75 MG EC tablet Take 75 mg by mouth 2 (two) times daily.      mineral oil-hydrophil petrolat (AQUAPHOR) Oint Apply topically as needed (dry skin). 396 g  1    omega 3-dha-epa-fish oil (FISH OIL) 1,000 mg (120 mg-180 mg) Cap Take 1 capsule by mouth once daily. 90 capsule 3    [DISCONTINUED] aspirin (ECOTRIN) 81 MG EC tablet Take 1 tablet (81 mg total) by mouth 2 (two) times daily. 90 tablet 2    [DISCONTINUED] esomeprazole (NEXIUM) 20 MG capsule TAKE 1 CAPSULE BEFORE      BREAKFAST 90 capsule 0    [DISCONTINUED] potassium chloride SA (K-DUR,KLOR-CON) 20 MEQ tablet Take 1 tablet (20 mEq total) by mouth 2 (two) times daily. 180 tablet 2    [DISCONTINUED] tadalafiL (CIALIS) 5 MG tablet Take one tablet prn before sexual activity; effects may last 36 hours. Max dose is 20 mg/dose up to 1 dose/24 hours. 10 tablet 1    ergocalciferol (ERGOCALCIFEROL) 50,000 unit Cap Take 1 capsule by mouth weekly for 12 weeks then take 1 capsule monthly after that (Patient not taking: Reported on 12/9/2024) 12 capsule 2    omega-3 acid ethyl esters (LOVAZA) 1 gram capsule Take 1 capsule by mouth.      [DISCONTINUED] gabapentin (NEURONTIN) 100 MG capsule Take 1 capsule (100 mg total) by mouth 3 (three) times daily. 90 capsule 5     No current facility-administered medications on file prior to visit.       Medical/Social/Family History:  Past Medical History:   Diagnosis Date    BMI 45.0-49.9, adult 06/20/2018    Cervicalgia 04/07/2021    DJD (degenerative joint disease) 03/15/2019    Edema 01/27/2021    Essential hypertension 04/07/2021    GERD (gastroesophageal reflux disease) 01/13/2021    Hyperlipidemia     Hypertension     Low back pain 06/19/2019    Osteoporosis     Pain in right shoulder 04/07/2021    Tachycardia 04/07/2021      Social History     Tobacco Use   Smoking Status Never   Smokeless Tobacco Never      Social History     Substance and Sexual Activity   Alcohol Use Yes       Family History   Problem Relation Name Age of Onset    Diabetes Mother          AGE 78    Diabetes Father          AGE 54    Hypertension Father      No Known Problems Brother          AGE 55    No Known  "Problems Brother          AGE 49    Hypertension Brother          AGE 47    No Known Problems Daughter      No Known Problems Daughter      No Known Problems Daughter      No Known Problems Son      No Known Problems Son        Past Surgical History:   Procedure Laterality Date    CARPAL TUNNEL RELEASE Left 2/17/2022    Procedure: RELEASE, CARPAL TUNNEL;  Surgeon: Wilber Crocker MD;  Location: [facility] OR;  Service: Orthopedics;  Laterality: Left;    PATELLA TENDONS REPAIR Bilateral     RADIOFREQUENCY ABLATION Left 2/11/2022    Procedure: Left GSV RF Ablation;  Surgeon: Collins Canada DO;  Location: [facility] OR;  Service: Vascular;  Laterality: Left;    TORN MENISCUS Left     ULNAR NERVE TRANSPOSITION Left 2/17/2022    Procedure: TRANSPOSITION, NERVE, ULNAR;  Surgeon: Wilber Crocker MD;  Location: [facility] OR;  Service: Orthopedics;  Laterality: Left;    WRIST FRACTURE SURGERY Left         There is no immunization history on file for this patient.       Objective:      Vitals:    12/09/24 1027   BP: 126/87   BP Location: Right arm   Patient Position: Sitting   Pulse: 102   Resp: 20   Temp: 98.5 °F (36.9 °C)   TempSrc: Oral   SpO2: 99%   Weight: (!) 138.8 kg (306 lb)   Height: 5' 8" (1.727 m)     Body mass index is 46.53 kg/m².     Physical Exam: Physical Exam  Constitutional:       General: He is not in acute distress.     Appearance: Normal appearance. He is obese. He is not ill-appearing, toxic-appearing or diaphoretic.   HENT:      Head: Normocephalic.      Mouth/Throat:      Mouth: Mucous membranes are moist.   Eyes:      Extraocular Movements: Extraocular movements intact.      Conjunctiva/sclera: Conjunctivae normal.      Pupils: Pupils are equal, round, and reactive to light.   Cardiovascular:      Rate and Rhythm: Normal rate and regular rhythm.      Heart sounds: Normal heart sounds.   Pulmonary:      Effort: Pulmonary effort is normal. No respiratory distress.      Breath sounds: Normal " breath sounds. No stridor. No wheezing, rhonchi or rales.   Abdominal:      General: Bowel sounds are normal. There is no distension.      Palpations: Abdomen is soft.      Tenderness: There is no abdominal tenderness.   Musculoskeletal:         General: Normal range of motion.      Cervical back: Normal range of motion and neck supple.      Right lower leg: Edema present.      Left lower leg: Edema present.      Right ankle: Normal range of motion.   Skin:     General: Skin is warm and dry.   Neurological:      General: No focal deficit present.      Mental Status: He is alert and oriented to person, place, and time.      Gait: Gait normal.   Psychiatric:         Mood and Affect: Mood normal.         Behavior: Behavior normal.                Assessment:          ICD-10-CM ICD-9-CM   1. Essential hypertension  I10 401.9   2. Type 2 diabetes mellitus without complication, without long-term current use of insulin  E11.9 250.00   3. Other hyperlipidemia  E78.49 272.4   4. Gastroesophageal reflux disease, unspecified whether esophagitis present  K21.9 530.81   5. Hypokalemia  E87.6 276.8   6. Primary hypertension  I10 401.9   7. Erectile dysfunction, unspecified erectile dysfunction type  N52.9 607.84   8. Acute right ankle pain  M25.571 719.47     338.19        Plan:       Essential hypertension  -     CBC Auto Differential; Future; Expected date: 12/09/2024  -     Comprehensive Metabolic Panel; Future; Expected date: 12/09/2024    Type 2 diabetes mellitus without complication, without long-term current use of insulin  -     Hemoglobin A1C; Future; Expected date: 12/09/2024  -     POCT glucose    Other hyperlipidemia  -     Lipid Panel; Future; Expected date: 12/09/2024  -     aspirin (ECOTRIN) 81 MG EC tablet; Take 1 tablet (81 mg total) by mouth 2 (two) times daily.  Dispense: 90 tablet; Refill: 2    Gastroesophageal reflux disease, unspecified whether esophagitis present  -     esomeprazole (NEXIUM) 20 MG capsule;  Take 1 capsule (20 mg total) by mouth before breakfast.  Dispense: 90 capsule; Refill: 2    Hypokalemia  -     potassium chloride SA (K-DUR,KLOR-CON) 20 MEQ tablet; Take 1 tablet (20 mEq total) by mouth 2 (two) times daily.  Dispense: 180 tablet; Refill: 2    Primary hypertension  -     aspirin (ECOTRIN) 81 MG EC tablet; Take 1 tablet (81 mg total) by mouth 2 (two) times daily.  Dispense: 90 tablet; Refill: 2    Erectile dysfunction, unspecified erectile dysfunction type  -     tadalafiL (CIALIS) 5 MG tablet; Take one tablet prn before sexual activity; effects may last 36 hours. Max dose is 20 mg/dose up to 1 dose/24 hours.  Dispense: 10 tablet; Refill: 1    Acute right ankle pain  -     Discontinue: ketorolac injection 60 mg  -     X-Ray Ankle Complete 3 View Right; Future; Expected date: 12/09/2024  -     Uric Acid; Future; Expected date: 12/09/2024  -     ketorolac injection 30 mg      Component      Latest Ref Rng 12/9/2024   POC Glucose      70 - 110 MG/          New & refilled meds:  Requested Prescriptions     Signed Prescriptions Disp Refills    esomeprazole (NEXIUM) 20 MG capsule 90 capsule 2     Sig: Take 1 capsule (20 mg total) by mouth before breakfast.    potassium chloride SA (K-DUR,KLOR-CON) 20 MEQ tablet 180 tablet 2     Sig: Take 1 tablet (20 mEq total) by mouth 2 (two) times daily.    aspirin (ECOTRIN) 81 MG EC tablet 90 tablet 2     Sig: Take 1 tablet (81 mg total) by mouth 2 (two) times daily.    tadalafiL (CIALIS) 5 MG tablet 10 tablet 1     Sig: Take one tablet prn before sexual activity; effects may last 36 hours. Max dose is 20 mg/dose up to 1 dose/24 hours.       Follow up in about 6 months (around 6/9/2025), or if symptoms worsen or fail to improve, for Hypertension.     There are no Patient Instructions on file for this visit.         Signature: Huey Mtz DNP, FNP-C

## 2024-12-10 ENCOUNTER — TELEPHONE (OUTPATIENT)
Dept: GASTROENTEROLOGY | Facility: CLINIC | Age: 54
End: 2024-12-10
Payer: COMMERCIAL

## 2024-12-10 ENCOUNTER — TELEPHONE (OUTPATIENT)
Dept: PRIMARY CARE CLINIC | Facility: CLINIC | Age: 54
End: 2024-12-10
Payer: COMMERCIAL

## 2024-12-10 DIAGNOSIS — M10.9 ACUTE GOUT, UNSPECIFIED CAUSE, UNSPECIFIED SITE: Primary | ICD-10-CM

## 2024-12-10 RX ORDER — ALLOPURINOL 100 MG/1
100 TABLET ORAL DAILY
Qty: 90 TABLET | Refills: 2 | Status: SHIPPED | OUTPATIENT
Start: 2024-12-10 | End: 2025-09-06

## 2024-12-10 NOTE — TELEPHONE ENCOUNTER
----- Message from Huey Mtz DNP, FNP-C sent at 12/9/2024  1:51 PM CST -----  Please notify of results.

## 2024-12-10 NOTE — TELEPHONE ENCOUNTER
Patient's wife called and notified lab results, prescription for allopurinol sent to the pharmacy and also asked if patient was taking his potassium. Patient's wife verbalized understanding and states he has not been taking his potassium. Patient's wife was instructed to let patient know to take potassium 3 times a day for 5 days then back to twice a day.

## 2024-12-10 NOTE — TELEPHONE ENCOUNTER
----- Message from Huey Mtz DNP, FNP-LAUREN sent at 12/10/2024  1:11 PM CST -----  Please notify of results.     Potassium is low. Ask patient if he is taking  the potassium 20 meq po twice a day as prescribed. If he is, he needs to take it 3 times per day for about 5 days, then go back to twice a day.     Uric acid is elevated. Ask if he is still having gout symptoms. May need allopurinol.

## 2024-12-10 NOTE — TELEPHONE ENCOUNTER
----- Message from Mamie Hatfield NP sent at 12/5/2024  5:19 PM CST -----  Metavir score of F0 normal liver fibrosis staging.  Exercise 150 minutes per week  Weight loss of 7-10% body weight  Diet low is saturated fats and carbohydrates  Good glucose and cholesterol control

## 2024-12-12 ENCOUNTER — HOSPITAL ENCOUNTER (OUTPATIENT)
Dept: RADIOLOGY | Facility: HOSPITAL | Age: 54
Discharge: HOME | End: 2024-12-12
Attending: STUDENT IN AN ORGANIZED HEALTH CARE EDUCATION/TRAINING PROGRAM
Payer: COMMERCIAL

## 2024-12-12 ENCOUNTER — HOSPITAL ENCOUNTER (OUTPATIENT)
Dept: CARDIOLOGY | Facility: HOSPITAL | Age: 54
Discharge: HOME | End: 2024-12-12
Attending: STUDENT IN AN ORGANIZED HEALTH CARE EDUCATION/TRAINING PROGRAM
Payer: COMMERCIAL

## 2024-12-12 VITALS — DIASTOLIC BLOOD PRESSURE: 88 MMHG | HEART RATE: 93 BPM | SYSTOLIC BLOOD PRESSURE: 139 MMHG

## 2024-12-12 DIAGNOSIS — I50.22 HEART FAILURE WITH MID-RANGE EJECTION FRACTION (HFMEF): ICD-10-CM

## 2024-12-12 LAB
CV STRESS BASE HR: 93 BPM
DIASTOLIC BLOOD PRESSURE: 88 MMHG
OHS CV CPX 1 MINUTE RECOVERY HEART RATE: 106 BPM
OHS CV CPX 85 PERCENT MAX PREDICTED HEART RATE MALE: 139
OHS CV CPX MAX PREDICTED HEART RATE: 164
OHS CV CPX PATIENT IS FEMALE: 0
OHS CV CPX PATIENT IS MALE: 1
OHS CV CPX PEAK DIASTOLIC BLOOD PRESSURE: 81 MMHG
OHS CV CPX PEAK HEAR RATE: 115 BPM
OHS CV CPX PEAK RATE PRESSURE PRODUCT: NORMAL
OHS CV CPX PEAK SYSTOLIC BLOOD PRESSURE: 151 MMHG
OHS CV CPX PERCENT MAX PREDICTED HEART RATE ACHIEVED: 70
OHS CV CPX RATE PRESSURE PRODUCT PRESENTING: NORMAL
SYSTOLIC BLOOD PRESSURE: 139 MMHG

## 2024-12-12 PROCEDURE — 78452 HT MUSCLE IMAGE SPECT MULT: CPT | Mod: TC

## 2024-12-12 PROCEDURE — A9500 TC99M SESTAMIBI: HCPCS | Performed by: STUDENT IN AN ORGANIZED HEALTH CARE EDUCATION/TRAINING PROGRAM

## 2024-12-12 PROCEDURE — 63600175 PHARM REV CODE 636 W HCPCS: Performed by: STUDENT IN AN ORGANIZED HEALTH CARE EDUCATION/TRAINING PROGRAM

## 2024-12-12 PROCEDURE — 93017 CV STRESS TEST TRACING ONLY: CPT

## 2024-12-12 RX ORDER — TETRAKIS(2-METHOXYISOBUTYLISOCYANIDE)COPPER(I) TETRAFLUOROBORATE 1 MG/ML
36 INJECTION, POWDER, LYOPHILIZED, FOR SOLUTION INTRAVENOUS
Status: COMPLETED | OUTPATIENT
Start: 2024-12-12 | End: 2024-12-12

## 2024-12-12 RX ORDER — REGADENOSON 0.08 MG/ML
0.4 INJECTION, SOLUTION INTRAVENOUS ONCE
Status: COMPLETED | OUTPATIENT
Start: 2024-12-12 | End: 2024-12-12

## 2024-12-12 RX ORDER — TETRAKIS(2-METHOXYISOBUTYLISOCYANIDE)COPPER(I) TETRAFLUOROBORATE 1 MG/ML
11.4 INJECTION, POWDER, LYOPHILIZED, FOR SOLUTION INTRAVENOUS
Status: COMPLETED | OUTPATIENT
Start: 2024-12-12 | End: 2024-12-12

## 2024-12-12 RX ADMIN — KIT FOR THE PREPARATION OF TECHNETIUM TC99M SESTAMIBI 11.4 MILLICURIE: 1 INJECTION, POWDER, LYOPHILIZED, FOR SOLUTION PARENTERAL at 07:12

## 2024-12-12 RX ADMIN — REGADENOSON 0.4 MG: 0.08 INJECTION, SOLUTION INTRAVENOUS at 08:12

## 2024-12-12 RX ADMIN — KIT FOR THE PREPARATION OF TECHNETIUM TC99M SESTAMIBI 36 MILLICURIE: 1 INJECTION, POWDER, LYOPHILIZED, FOR SOLUTION PARENTERAL at 08:12

## 2025-01-06 ENCOUNTER — OFFICE VISIT (OUTPATIENT)
Dept: CARDIOLOGY | Facility: CLINIC | Age: 55
End: 2025-01-06
Payer: COMMERCIAL

## 2025-01-06 VITALS
BODY MASS INDEX: 45.77 KG/M2 | HEIGHT: 68 IN | RESPIRATION RATE: 18 BRPM | SYSTOLIC BLOOD PRESSURE: 135 MMHG | WEIGHT: 302 LBS | DIASTOLIC BLOOD PRESSURE: 80 MMHG | HEART RATE: 82 BPM

## 2025-01-06 DIAGNOSIS — R94.39 ABNORMAL STRESS TEST: ICD-10-CM

## 2025-01-06 DIAGNOSIS — I10 BENIGN ESSENTIAL HTN: ICD-10-CM

## 2025-01-06 DIAGNOSIS — I50.22 HEART FAILURE WITH MID-RANGE EJECTION FRACTION (HFMEF): Primary | ICD-10-CM

## 2025-01-06 PROCEDURE — 3075F SYST BP GE 130 - 139MM HG: CPT | Mod: ,,, | Performed by: STUDENT IN AN ORGANIZED HEALTH CARE EDUCATION/TRAINING PROGRAM

## 2025-01-06 PROCEDURE — 99215 OFFICE O/P EST HI 40 MIN: CPT | Mod: PBBFAC | Performed by: STUDENT IN AN ORGANIZED HEALTH CARE EDUCATION/TRAINING PROGRAM

## 2025-01-06 PROCEDURE — 3079F DIAST BP 80-89 MM HG: CPT | Mod: ,,, | Performed by: STUDENT IN AN ORGANIZED HEALTH CARE EDUCATION/TRAINING PROGRAM

## 2025-01-06 PROCEDURE — 3008F BODY MASS INDEX DOCD: CPT | Mod: ,,, | Performed by: STUDENT IN AN ORGANIZED HEALTH CARE EDUCATION/TRAINING PROGRAM

## 2025-01-06 PROCEDURE — 99999 PR PBB SHADOW E&M-EST. PATIENT-LVL V: CPT | Mod: PBBFAC,,, | Performed by: STUDENT IN AN ORGANIZED HEALTH CARE EDUCATION/TRAINING PROGRAM

## 2025-01-06 PROCEDURE — 1159F MED LIST DOCD IN RCRD: CPT | Mod: ,,, | Performed by: STUDENT IN AN ORGANIZED HEALTH CARE EDUCATION/TRAINING PROGRAM

## 2025-01-06 PROCEDURE — 99214 OFFICE O/P EST MOD 30 MIN: CPT | Mod: S$PBB,,, | Performed by: STUDENT IN AN ORGANIZED HEALTH CARE EDUCATION/TRAINING PROGRAM

## 2025-01-06 NOTE — PROGRESS NOTES
PCP: Huey Mtz, WENDY, FNP-C    Referring Provider:     Subjective:   Papo Bell is a 54 y.o. male with hx of obesity, ETOH Use (upto 1 pint of whiskey/day), transaminitis, HTN, HLD who presents for evaluation of abnormal EKG.     1/6/25 - Doing well. Cut back on his etoh use. Did have abnormal stress test for EF of 45-50%. Discussed CCTA vs. LHC. Patient prefers CCTA.     9/11/24 - Doing well. Is hypertensive. ECHO with EF of 45-50%. Denies any complaints. Is tachycardic in clinic    6/11/24 - Patient denies any symptoms. He is tachycardic to 120s in clinic. Denies any palpitation, CP, SOB. He does report bilateral leg swelling for which he goes to the vein clinic. He is nervous in the clinic. Occasionally take albuterol inh.       Fhx: non-contributary  Shx: ETOH use (upto 1 pint of whiskey/day), denies smoking, or drug use    EKG - 6/11/24 - Sinus tachycardia with PVC, LPFB, TW abnl.     ECHO Results for orders placed during the hospital encounter of 07/01/24    Echo    Interpretation Summary    Left Ventricle: Left ventricle was not well visualized due to poor sonic window. The left ventricle is normal in size. Increased wall thickness. Septal thickening. Mild global hypokinesis present. There is mildly reduced systolic function with a visually estimated ejection fraction of 45 - 50%. There is diastolic dysfunction.    Right Ventricle: Normal right ventricular cavity size. Systolic function is normal.    Aortic Valve: The aortic valve is a trileaflet valve.    Mitral Valve: There is mild regurgitation.    Tricuspid Valve: There is mild to moderate regurgitation.    Stress Results for orders placed during the hospital encounter of 12/12/24    Nuclear Stress Test    Interpretation Summary    The ECG portion of the study is negative for ischemia.    The patient reported no chest pain during the stress test.    During stress, rare PVCs are noted.    The nuclear portion of this study will be reported  separately.  Impression:   1. Cardiac SPECT is positive for ischemia within abnormal TID ratio 1.33 suggestive of balanced ischemia.  2. the global left ventricular systolic function is abnormal with an LV ejection fraction of 46 % and evidence of LV dilation.  Wall motion is normal.    Lab Results   Component Value Date     12/09/2024    K 3.1 (L) 12/09/2024     (H) 12/09/2024    CO2 23 12/09/2024    BUN 12 12/09/2024    CREATININE 0.79 12/09/2024    CALCIUM 7.2 (L) 12/09/2024    ANIONGAP 12 12/09/2024    ESTGFRAFRICA 85 12/06/2021    EGFRNONAA 90 07/12/2022       Lab Results   Component Value Date    CHOL 149 12/09/2024    CHOL 133 04/23/2024    CHOL 318 (H) 01/03/2024     Lab Results   Component Value Date    HDL 65 (H) 12/09/2024    HDL 70 (H) 04/23/2024    HDL 83 (H) 01/03/2024     Lab Results   Component Value Date    LDLCALC 5 12/09/2024    LDLCALC 32 04/23/2024    LDLCALC 42 01/30/2023     Lab Results   Component Value Date    TRIG 397 (H) 12/09/2024    TRIG 155 (H) 04/23/2024    TRIG 1,145 (H) 01/03/2024     Lab Results   Component Value Date    CHOLHDL 2.3 12/09/2024    CHOLHDL 1.9 04/23/2024    CHOLHDL 3.8 01/03/2024       Lab Results   Component Value Date    WBC 6.09 12/09/2024    HGB 11.8 (L) 12/09/2024    HCT 36.5 (L) 12/09/2024    .3 (H) 12/09/2024     12/09/2024           Current Outpatient Medications:     allopurinoL (ZYLOPRIM) 100 MG tablet, Take 1 tablet (100 mg total) by mouth once daily., Disp: 90 tablet, Rfl: 2    amLODIPine (NORVASC) 10 MG tablet, TAKE 1 TABLET ONCE DAILY, Disp: 90 tablet, Rfl: 2    aspirin (ECOTRIN) 81 MG EC tablet, Take 1 tablet (81 mg total) by mouth 2 (two) times daily., Disp: 90 tablet, Rfl: 2    atorvastatin (LIPITOR) 20 MG tablet, TAKE 1 TABLET EVERY EVENING, Disp: 90 tablet, Rfl: 2    carvediloL (COREG) 12.5 MG tablet, Take 1 tablet (12.5 mg total) by mouth 2 (two) times daily with meals., Disp: 180 tablet, Rfl: 3    diclofenac (VOLTAREN) 75  "MG EC tablet, Take 75 mg by mouth 2 (two) times daily., Disp: , Rfl:     ergocalciferol (ERGOCALCIFEROL) 50,000 unit Cap, Take 1 capsule by mouth weekly for 12 weeks then take 1 capsule monthly after that (Patient not taking: Reported on 12/9/2024), Disp: 12 capsule, Rfl: 2    esomeprazole (NEXIUM) 20 MG capsule, Take 1 capsule (20 mg total) by mouth before breakfast., Disp: 90 capsule, Rfl: 2    gabapentin (NEURONTIN) 100 MG capsule, Take 1 capsule (100 mg total) by mouth 3 (three) times daily., Disp: 90 capsule, Rfl: 3    mineral oil-hydrophil petrolat (AQUAPHOR) Oint, Apply topically as needed (dry skin)., Disp: 396 g, Rfl: 1    omega 3-dha-epa-fish oil (FISH OIL) 1,000 mg (120 mg-180 mg) Cap, Take 1 capsule by mouth once daily., Disp: 90 capsule, Rfl: 3    omega-3 acid ethyl esters (LOVAZA) 1 gram capsule, Take 1 capsule by mouth., Disp: , Rfl:     potassium chloride SA (K-DUR,KLOR-CON) 20 MEQ tablet, Take 1 tablet (20 mEq total) by mouth 2 (two) times daily., Disp: 180 tablet, Rfl: 2    tadalafiL (CIALIS) 5 MG tablet, Take one tablet prn before sexual activity; effects may last 36 hours. Max dose is 20 mg/dose up to 1 dose/24 hours., Disp: 10 tablet, Rfl: 1    Review of Systems   Respiratory:  Negative for cough and shortness of breath.    Cardiovascular:  Positive for leg swelling. Negative for chest pain, palpitations, orthopnea, claudication and PND.         Objective:   /80   Pulse 82   Resp 18   Ht 5' 8" (1.727 m)   Wt (!) 137 kg (302 lb)   BMI 45.92 kg/m²     Physical Exam  Vitals and nursing note reviewed.   Constitutional:       Appearance: Normal appearance.   Cardiovascular:      Rate and Rhythm: Regular rhythm. Tachycardia present.      Pulses: Normal pulses.      Heart sounds: Normal heart sounds.   Pulmonary:      Breath sounds: Normal breath sounds.   Musculoskeletal:      Right lower leg: Edema present.      Left lower leg: Edema present.   Neurological:      Mental Status: He is alert " and oriented to person, place, and time.           Assessment:     1. Heart failure with mid-range ejection fraction (HFmEF)  CTA Cardiac    CTA Cardiac      2. Abnormal stress test  CTA Cardiac    CTA Cardiac      3. Benign essential HTN              Plan:   Benign essential HTN  Improved     Heart failure with mid-range ejection fraction (HFmEF)  EF 45-50% - Stress test is abnormal  Plan for CCTA  Euvolemic  Tachycardic  On  coreg to 12.5mg bid for tachycardia

## 2025-01-06 NOTE — ASSESSMENT & PLAN NOTE
EF 45-50% - Stress test is abnormal  Plan for CCTA  Euvolemic  Tachycardic  On  coreg to 12.5mg bid for tachycardia

## 2025-02-07 ENCOUNTER — TELEPHONE (OUTPATIENT)
Dept: CARDIOLOGY | Facility: CLINIC | Age: 55
End: 2025-02-07
Payer: COMMERCIAL

## 2025-02-07 NOTE — TELEPHONE ENCOUNTER
Spoke to pt.'s wife pt.s CCTA results mild plaque no blockages continue cholestrol medication per Dr. Cortes. Pt.'s wife voiced understanding.

## 2025-02-17 DIAGNOSIS — E87.6 HYPOKALEMIA: ICD-10-CM

## 2025-02-17 RX ORDER — POTASSIUM CHLORIDE 1500 MG/1
20 TABLET, EXTENDED RELEASE ORAL
Qty: 90 TABLET | Refills: 2 | Status: SHIPPED | OUTPATIENT
Start: 2025-02-17

## 2025-02-20 ENCOUNTER — OFFICE VISIT (OUTPATIENT)
Dept: PRIMARY CARE CLINIC | Facility: CLINIC | Age: 55
End: 2025-02-20
Payer: COMMERCIAL

## 2025-02-20 VITALS
HEART RATE: 105 BPM | WEIGHT: 290 LBS | OXYGEN SATURATION: 98 % | TEMPERATURE: 98 F | DIASTOLIC BLOOD PRESSURE: 69 MMHG | BODY MASS INDEX: 43.95 KG/M2 | HEIGHT: 68 IN | SYSTOLIC BLOOD PRESSURE: 109 MMHG | RESPIRATION RATE: 20 BRPM

## 2025-02-20 DIAGNOSIS — M10.9 ACUTE GOUT, UNSPECIFIED CAUSE, UNSPECIFIED SITE: ICD-10-CM

## 2025-02-20 DIAGNOSIS — I10 ESSENTIAL HYPERTENSION: Primary | ICD-10-CM

## 2025-02-20 DIAGNOSIS — K21.9 GASTROESOPHAGEAL REFLUX DISEASE, UNSPECIFIED WHETHER ESOPHAGITIS PRESENT: ICD-10-CM

## 2025-02-20 RX ORDER — ALLOPURINOL 100 MG/1
100 TABLET ORAL DAILY
Qty: 90 TABLET | Refills: 2 | Status: SHIPPED | OUTPATIENT
Start: 2025-02-20 | End: 2025-11-17

## 2025-02-20 RX ORDER — HYDROGEN PEROXIDE 3 %
20 SOLUTION, NON-ORAL MISCELLANEOUS
Qty: 90 CAPSULE | Refills: 2 | Status: SHIPPED | OUTPATIENT
Start: 2025-02-20

## 2025-02-20 NOTE — PROGRESS NOTES
[facility] [place] [address] [place] [address]  Ph: [phone]   Huey Mtz DNP, FNP-C  Primary Care       PATIENT NAME: Papo Bell   : 1970    AGE: 54 y.o. DATE: 2025    MRN: [id number]        Reason for Visit / Chief Complaint:  Gastroesophageal Reflux (Refill  Nexium, and  Allopurinol )     Subjective:     HPI: Patient here for med refills: allopurinol and Nexium.     Patient has a history of abnormal stress test, heart failure, HTN. Sees cardiology (Dr. Cortes).     Patient states he is unable to perform the fit test at his job due to medical history and claustrophobia. Requesting that a letter be written for his place of employment stating he is unable to participate in fit test.          Review of Systems: Review of Systems   Constitutional:  Negative for fever.   Respiratory:  Negative for cough and shortness of breath.    Cardiovascular:  Negative for chest pain.   Genitourinary:  Negative for dysuria.   Musculoskeletal:  Negative for gait problem.   Skin:  Negative for rash.   Neurological:  Negative for headaches.          Review of patient's allergies indicates:  No Known Allergies     Med List:  Medications Ordered Prior to Encounter[1]    Medical/Social/Family History:  Past Medical History:   Diagnosis Date    BMI 45.0-49.9, adult 2018    Cervicalgia 2021    DJD (degenerative joint disease) 03/15/2019    Edema 2021    Essential hypertension 2021    GERD (gastroesophageal reflux disease) 2021    Hyperlipidemia     Hypertension     Low back pain 2019    Osteoporosis     Pain in right shoulder 2021    Tachycardia 2021      Tobacco Use History[2]   Social History     Substance and Sexual Activity   Alcohol Use Yes       Family History   Problem Relation Name Age of Onset    Diabetes Mother          AGE 78    Diabetes Father          AGE 54    Hypertension Father      No Known Problems  "Brother          AGE 55    No Known Problems Brother          AGE 49    Hypertension Brother          AGE 47    No Known Problems Daughter      No Known Problems Daughter      No Known Problems Daughter      No Known Problems Son      No Known Problems Son        Past Surgical History:   Procedure Laterality Date    CARPAL TUNNEL RELEASE Left 2/17/2022    Procedure: RELEASE, CARPAL TUNNEL;  Surgeon: Wilber Crocker MD;  Location: [facility] OR;  Service: Orthopedics;  Laterality: Left;    PATELLA TENDONS REPAIR Bilateral     RADIOFREQUENCY ABLATION Left 2/11/2022    Procedure: Left GSV RF Ablation;  Surgeon: Collins Canada DO;  Location: [facility] OR;  Service: Vascular;  Laterality: Left;    TORN MENISCUS Left     ULNAR NERVE TRANSPOSITION Left 2/17/2022    Procedure: TRANSPOSITION, NERVE, ULNAR;  Surgeon: Wilber Crocker MD;  Location: [facility] OR;  Service: Orthopedics;  Laterality: Left;    WRIST FRACTURE SURGERY Left         There is no immunization history on file for this patient.       Objective:      Vitals:    02/20/25 1459   BP: 109/69   BP Location: Right arm   Patient Position: Sitting   Pulse: 105   Resp: 20   Temp: 98.4 °F (36.9 °C)   TempSrc: Oral   SpO2: 98%   Weight: 131.5 kg (290 lb)   Height: 5' 8" (1.727 m)     Body mass index is 44.09 kg/m².     Physical Exam: Physical Exam  Constitutional:       General: He is not in acute distress.     Appearance: Normal appearance. He is not ill-appearing, toxic-appearing or diaphoretic.   HENT:      Head: Normocephalic.      Nose: Nose normal.      Mouth/Throat:      Mouth: Mucous membranes are moist.   Eyes:      Extraocular Movements: Extraocular movements intact.      Conjunctiva/sclera: Conjunctivae normal.      Pupils: Pupils are equal, round, and reactive to light.   Cardiovascular:      Rate and Rhythm: Normal rate and regular rhythm.      Heart sounds: Normal heart sounds.   Pulmonary:      Effort: Pulmonary effort is " normal.      Breath sounds: Normal breath sounds.   Abdominal:      Palpations: Abdomen is soft.   Musculoskeletal:         General: Normal range of motion.      Cervical back: Normal range of motion and neck supple.   Skin:     General: Skin is warm and dry.      Capillary Refill: Capillary refill takes less than 2 seconds.   Neurological:      General: No focal deficit present.      Mental Status: He is alert and oriented to person, place, and time.      Gait: Gait normal.   Psychiatric:         Mood and Affect: Mood normal.         Behavior: Behavior normal.              Assessment:          ICD-10-CM ICD-9-CM   1. Essential hypertension  I10 401.9   2. Acute gout, unspecified cause, unspecified site  M10.9 274.01   3. Gastroesophageal reflux disease, unspecified whether esophagitis present  K21.9 530.81        Plan:       Essential hypertension    Acute gout, unspecified cause, unspecified site  -     allopurinoL (ZYLOPRIM) 100 MG tablet; Take 1 tablet (100 mg total) by mouth once daily.  Dispense: 90 tablet; Refill: 2    Gastroesophageal reflux disease, unspecified whether esophagitis present  -     esomeprazole (NEXIUM) 20 MG capsule; Take 1 capsule (20 mg total) by mouth before breakfast.  Dispense: 90 capsule; Refill: 2          New & refilled meds:  Requested Prescriptions     Signed Prescriptions Disp Refills    allopurinoL (ZYLOPRIM) 100 MG tablet 90 tablet 2     Sig: Take 1 tablet (100 mg total) by mouth once daily.    esomeprazole (NEXIUM) 20 MG capsule 90 capsule 2     Sig: Take 1 capsule (20 mg total) by mouth before breakfast.       Follow up in about 4 months (around 6/20/2025), or if symptoms worsen or fail to improve, for Hypertension.     There are no Patient Instructions on file for this visit.         Signature: Huey Mtz DNP, FNP-C           [1]  Current Outpatient Medications on File Prior to Visit   Medication Sig Dispense Refill    amLODIPine (NORVASC) 10 MG tablet TAKE 1 TABLET  ONCE DAILY 90 tablet 2    aspirin (ECOTRIN) 81 MG EC tablet Take 1 tablet (81 mg total) by mouth 2 (two) times daily. 90 tablet 2    atorvastatin (LIPITOR) 20 MG tablet TAKE 1 TABLET EVERY EVENING 90 tablet 2    carvediloL (COREG) 12.5 MG tablet Take 1 tablet (12.5 mg total) by mouth 2 (two) times daily with meals. 180 tablet 3    diclofenac (VOLTAREN) 75 MG EC tablet Take 75 mg by mouth 2 (two) times daily.      gabapentin (NEURONTIN) 100 MG capsule Take 1 capsule (100 mg total) by mouth 3 (three) times daily. 90 capsule 3    KLOR-CON M20 20 mEq tablet TAKE 1 TABLET ONCE DAILY 90 tablet 2    [DISCONTINUED] allopurinoL (ZYLOPRIM) 100 MG tablet Take 1 tablet (100 mg total) by mouth once daily. 90 tablet 2    [DISCONTINUED] esomeprazole (NEXIUM) 20 MG capsule Take 1 capsule (20 mg total) by mouth before breakfast. 90 capsule 2    ergocalciferol (ERGOCALCIFEROL) 50,000 unit Cap Take 1 capsule by mouth weekly for 12 weeks then take 1 capsule monthly after that (Patient not taking: Reported on 12/9/2024) 12 capsule 2    mineral oil-hydrophil petrolat (AQUAPHOR) Oint Apply topically as needed (dry skin). 396 g 1    omega 3-dha-epa-fish oil (FISH OIL) 1,000 mg (120 mg-180 mg) Cap Take 1 capsule by mouth once daily. 90 capsule 3    omega-3 acid ethyl esters (LOVAZA) 1 gram capsule Take 1 capsule by mouth. (Patient not taking: Reported on 2/20/2025)      tadalafiL (CIALIS) 5 MG tablet Take one tablet prn before sexual activity; effects may last 36 hours. Max dose is 20 mg/dose up to 1 dose/24 hours. 10 tablet 1     No current facility-administered medications on file prior to visit.   [2]  Social History  Tobacco Use   Smoking Status Never   Smokeless Tobacco Never

## 2025-05-09 ENCOUNTER — TELEPHONE (OUTPATIENT)
Dept: GASTROENTEROLOGY | Facility: CLINIC | Age: 55
End: 2025-05-09
Payer: COMMERCIAL

## 2025-08-25 ENCOUNTER — PATIENT MESSAGE (OUTPATIENT)
Facility: HOSPITAL | Age: 55
End: 2025-08-25
Payer: COMMERCIAL

## (undated) DEVICE — GLOVE SURGICAL PROTEXIS PI BLUE SIZE 7.0

## (undated) DEVICE — BANDAGE ELASTIC 6IN X 5.8YD VELCRO LATEX FREE STERILE

## (undated) DEVICE — GLOVE SURGICAL PROTEXIS PI CLASSIC SIZE 6.5

## (undated) DEVICE — CAST SMALL OR FROM CAST CART

## (undated) DEVICE — PADDING CAST 2IN STERILE

## (undated) DEVICE — SUTURE ETHILON 4-0 PS2 BLK 18 IN

## (undated) DEVICE — BLADE CARBON SURG SS SZ 15 STERILE

## (undated) DEVICE — BANDAGE ESMARK 4X 3YDS STERILE"

## (undated) DEVICE — BUNDLE CATHETER ABLATION 60CM

## (undated) DEVICE — BANDAGE ELASTIC 2IN X 5.8YRD NS SELF CLOSE

## (undated) DEVICE — SPONGE GAUZE 4X4 12 PLY STL AMD 10/TRAY

## (undated) DEVICE — WRAP COBAN SELF ADH 6 INX5YD STRETCHED STERILE

## (undated) DEVICE — GLOVE SURGICAL PROTEXIS PI CLASSIC SIZE 7.5

## (undated) DEVICE — GLOVE SURGICAL PROTEXIS PI CLASSIC SIZE 7.0

## (undated) DEVICE — APPLICATOR CHLORAPREP HI-LITE TINTED ORANGE 26ML

## (undated) DEVICE — SUTURE ETHILON 3-0 18 BLK PS2

## (undated) DEVICE — SOL IRRIGATION SALINE 0.9% 1000ML BOTTLE

## (undated) DEVICE — SUTURE VICRYL 2-0 CT-1 27"

## (undated) DEVICE — COVER SNAP KAPS 26INCH DEPTH

## (undated) DEVICE — GLOVE SURGICAL PROTEXIS PI CLASSIC SIZE 8.0

## (undated) DEVICE — COVER LIGHT HANDLE FLEX PK/2

## (undated) DEVICE — GOWN SURGICAL STERILE LEVEL 3 / XX-LARGE

## (undated) DEVICE — GLOVE SURGICAL PROTEXIS PI CLASSIC SIZE 8.5

## (undated) DEVICE — TOURNIQUET CUFF DISP QC 24 INCH

## (undated) DEVICE — SLING ARM VOGUE LARGE W/LOGO

## (undated) DEVICE — WRAP COBAN SELF ADHERENT 4IN X 5YD STERILE

## (undated) DEVICE — BANDAGE KERLIX AMD  4.5IN  SPONGE ROLL

## (undated) DEVICE — PADDING CAST SPECIALIST SYNTHETIC 4IN X 4YD STERILE

## (undated) DEVICE — LOOP MAXI RED SILICONE PK/2

## (undated) DEVICE — TUBING FOR TUMESCENT INFILTRATION PUMP

## (undated) DEVICE — CDS EXTREMITY

## (undated) DEVICE — GOWN SURGICAL SMARTGOWN LEVEL 4 / EXTRA LARGE STERILE

## (undated) DEVICE — STOCKINETTE COTTON 4FTX48IN 2-PLY OFF WHITE STERILE